# Patient Record
Sex: MALE | Race: WHITE | NOT HISPANIC OR LATINO | Employment: UNEMPLOYED | URBAN - NONMETROPOLITAN AREA
[De-identification: names, ages, dates, MRNs, and addresses within clinical notes are randomized per-mention and may not be internally consistent; named-entity substitution may affect disease eponyms.]

---
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
## 2020-01-10 ENCOUNTER — OFFICE VISIT (OUTPATIENT)
Dept: FAMILY MEDICINE | Facility: OTHER | Age: 20
End: 2020-01-10
Attending: NURSE PRACTITIONER
Payer: COMMERCIAL

## 2020-01-10 VITALS
HEIGHT: 66 IN | BODY MASS INDEX: 32.5 KG/M2 | TEMPERATURE: 97.8 F | OXYGEN SATURATION: 98 % | DIASTOLIC BLOOD PRESSURE: 80 MMHG | RESPIRATION RATE: 16 BRPM | HEART RATE: 81 BPM | SYSTOLIC BLOOD PRESSURE: 118 MMHG | WEIGHT: 202.25 LBS

## 2020-01-10 DIAGNOSIS — S46.812A TRAPEZIUS STRAIN, LEFT, INITIAL ENCOUNTER: Primary | ICD-10-CM

## 2020-01-10 PROCEDURE — 99213 OFFICE O/P EST LOW 20 MIN: CPT | Performed by: NURSE PRACTITIONER

## 2020-01-10 PROCEDURE — G0463 HOSPITAL OUTPT CLINIC VISIT: HCPCS

## 2020-01-10 ASSESSMENT — MIFFLIN-ST. JEOR: SCORE: 1875.15

## 2020-01-10 ASSESSMENT — PAIN SCALES - GENERAL: PAINLEVEL: MODERATE PAIN (4)

## 2020-01-10 NOTE — PROGRESS NOTES
"HPI:    Vinayak Leavitt is a 19 year old male who presents to clinic today for left shoulder pain.  He reports he was working out last night and lifting weights.  He felt a pulling sensation in his left shoulder.  He is able to move his arm without difficulties and just has some mild tenderness over the trapezius area.  He did take Tylenol last night which was helpful.  He has not applied any ice.  He just wanted to have this shoulder evaluated before he continue to work out.  No history of trapezius or shoulder injuries in the past    Past Medical History:   Diagnosis Date     Otitis media     6/6/09,left- treated with Zithromax     Streptococcal pharyngitis     No Comments Provided       Current Outpatient Medications   Medication Sig Dispense Refill     acetaminophen (TYLENOL) 500 MG tablet Take 1,000 mg by mouth every 6 hours as needed       ibuprofen (ADVIL/MOTRIN) 200 MG tablet Take 400 mg by mouth every 4 hours as needed for mild pain         No Known Allergies    ROS:  Pertinent positives and negatives are noted in HPI.    EXAM:  /80 (BP Location: Right arm, Patient Position: Sitting, Cuff Size: Adult Regular)   Pulse 81   Temp 97.8  F (36.6  C) (Tympanic)   Resp 16   Ht 1.676 m (5' 6\")   Wt 91.7 kg (202 lb 4 oz)   SpO2 98%   BMI 32.64 kg/m    General appearance: well appearing male, in no acute distress  Musculoskeletal: Normal range of motion of left shoulder, no shoulder tenderness with palpation.  Normal strength without reproduction of any discomfort.  He does have spasm noted over left trapezius on palpation  Dermatological: no rashes or lesions  Psychological: normal affect, alert and pleasant    ASSESSMENT AND PLAN:    1. Trapezius strain, left, initial encounter      Left trapezius strain related to recent weight lifting.  Discussed symptomatic management including ice and NSAIDs.  Recommend limiting upper body/overhead lifting over the next week.  Follow-up as needed.      Raquel GUTIERREZ" ABI Elliott CNP..................1/10/2020 10:38 AM      This document was prepared using voice generated software.  While every attempt was made for accuracy, grammatical errors may exist.

## 2020-01-10 NOTE — PATIENT INSTRUCTIONS
Left shoulder strain-ice to shoulder often, tylenol or ibuprofen as needed, limit over-head weights for the next week  Follow up as needed

## 2020-02-06 ENCOUNTER — OFFICE VISIT (OUTPATIENT)
Dept: FAMILY MEDICINE | Facility: OTHER | Age: 20
End: 2020-02-06
Attending: FAMILY MEDICINE
Payer: COMMERCIAL

## 2020-02-06 VITALS
RESPIRATION RATE: 16 BRPM | WEIGHT: 204.2 LBS | OXYGEN SATURATION: 100 % | DIASTOLIC BLOOD PRESSURE: 65 MMHG | BODY MASS INDEX: 32.05 KG/M2 | HEIGHT: 67 IN | TEMPERATURE: 97.5 F | SYSTOLIC BLOOD PRESSURE: 112 MMHG | HEART RATE: 65 BPM

## 2020-02-06 DIAGNOSIS — J02.9 SORE THROAT: Primary | ICD-10-CM

## 2020-02-06 LAB
SPECIMEN SOURCE: NORMAL
STREP GROUP A PCR: NOT DETECTED

## 2020-02-06 PROCEDURE — G0463 HOSPITAL OUTPT CLINIC VISIT: HCPCS

## 2020-02-06 PROCEDURE — 99213 OFFICE O/P EST LOW 20 MIN: CPT | Performed by: PHYSICIAN ASSISTANT

## 2020-02-06 PROCEDURE — 87651 STREP A DNA AMP PROBE: CPT | Mod: ZL | Performed by: FAMILY MEDICINE

## 2020-02-06 ASSESSMENT — PAIN SCALES - GENERAL: PAINLEVEL: MILD PAIN (2)

## 2020-02-06 ASSESSMENT — ENCOUNTER SYMPTOMS
EYES NEGATIVE: 1
COUGH: 1
SINUS PRESSURE: 0
WHEEZING: 0
SHORTNESS OF BREATH: 0
RHINORRHEA: 1
CARDIOVASCULAR NEGATIVE: 1
CONSTITUTIONAL NEGATIVE: 1
SINUS PAIN: 0
SORE THROAT: 1

## 2020-02-06 ASSESSMENT — MIFFLIN-ST. JEOR: SCORE: 1892.49

## 2020-02-06 NOTE — PROGRESS NOTES
"Nursing Notes:   Lily Hightower LPN  2/6/2020 11:45 AM  Signed  Chief Complaint   Patient presents with     Pharyngitis     x 2 days       Initial /65   Pulse 65   Temp 97.5  F (36.4  C) (Tympanic)   Resp 16   Ht 1.69 m (5' 6.54\")   Wt 92.6 kg (204 lb 3.2 oz)   SpO2 100%   BMI 32.43 kg/m    Estimated body mass index is 32.43 kg/m  as calculated from the following:    Height as of this encounter: 1.69 m (5' 6.54\").    Weight as of this encounter: 92.6 kg (204 lb 3.2 oz).  Medication Reconciliation: complete    Lily Hightower LPN    SUBJECTIVE:     HPI  Vinayak Leavitt is a 19 year old male who presents to clinic today for evaluation of sore throat and cough that started yesterday. Coughing up a yellowish mucous. Taking Tylenol at home which has helped some. No known sick contacts. Eating and drinking well. Denies fever, chills, sweats, shortness of breath, wheezing, ear pain or discharge, sinus pain or pressure, muscle or body aches.     Did receive the influenza vaccine this year.     Review of Systems   Constitutional: Negative.    HENT: Positive for rhinorrhea and sore throat. Negative for congestion, ear discharge, ear pain, sinus pressure and sinus pain.    Eyes: Negative.    Respiratory: Positive for cough. Negative for shortness of breath and wheezing.    Cardiovascular: Negative.           PAST MEDICAL HISTORY:   Past Medical History:   Diagnosis Date     Otitis media     6/6/09,left- treated with Zithromax     Streptococcal pharyngitis     No Comments Provided       PAST SURGICAL HISTORY:   Past Surgical History:   Procedure Laterality Date     OTHER SURGICAL HISTORY      [id number],OTHER,lacerated tendon     OTHER SURGICAL HISTORY      [id number],OTHER,Times two     TONSILLECTOMY      06/2017       FAMILY HISTORY:   Family History   Problem Relation Age of Onset     Heart Disease Mother         Heart Disease,cardiac coronary anomaly     Family History Negative Father         Good Health     " "Attention Deficit Disorder Maternal Half-Sister         ADD / ADHD     Family History Negative Maternal Half-Sister         Good Health     Genetic Disorder No family hx of         Genetic,No family history of asthma, allergy or atopic dermatitis.       SOCIAL HISTORY:   Social History     Tobacco Use     Smoking status: Never Smoker     Smokeless tobacco: Never Used     Tobacco comment: Quit smoking: mom smokes around him every once in awhile   Substance Use Topics     Alcohol use: No     Alcohol/week: 0.0 standard drinks      No Known Allergies    Current Outpatient Medications   Medication     acetaminophen (TYLENOL) 500 MG tablet     ibuprofen (ADVIL/MOTRIN) 200 MG tablet     No current facility-administered medications for this visit.        OBJECTIVE:     /65   Pulse 65   Temp 97.5  F (36.4  C) (Tympanic)   Resp 16   Ht 1.69 m (5' 6.54\")   Wt 92.6 kg (204 lb 3.2 oz)   SpO2 100%   BMI 32.43 kg/m    Body mass index is 32.43 kg/m .  Physical Exam  General: Pleasant, in no apparent distress.  Eyes: Sclera are white and conjunctiva are clear bilaterally. Lacrimal apparatus free of erythema, edema, and discharge bilaterally.  Ears: External ears without erythema or edema. Tympanic membranes are pearly white and without erythema, scarring or perforations bilaterally. External auditory canals are free of foreign bodies, erythema, ulcers, and masses.  Nose: External nose is symmetrical and free of lesions and deformities. Mucosa is soft pink and without erythema, edema, bleeding, or exudate. No septal perforation or deviation.  Oropharynx: Oral mucosa is pink and without ulcers, nodules, and white patches. Tongue is symmetrical, pink, and without masses or lesions. Pharynx is mildly erythematous, symmetrical, and without lesions. Uvula is midline. Tonsils are pink, symmetrical, and without edema, ulcers, or exudates, and 1+ bilaterally.  Neck: No cervical lymphadenopathy on inspection and " palpation.  Cardiovascular: Regular rate and rhythm with S1 equal to S2. No murmurs, friction rubs, or gallops.   Respiratory: Lungs are resonant and clear to auscultation bilaterally. No wheezes, crackles, or rhonchi.  Psych: Appropriate mood and affect.    Results for orders placed or performed in visit on 02/06/20   Group A Streptococcus PCR Throat Swab     Status: None   Result Value Ref Range    Specimen Description Throat     Strep Group A PCR Not Detected NDET^Not Detected         ASSESSMENT/PLAN:     1. Sore throat      Discussed with patient that given his symptoms, physical exam findings, and negative strep swab he likely has a viral URI with cough. Encouraged symptomatic management with Tylenol or ibuprofen, OTC cough or cold medications, cough drops, honey, warm teas. Call or return to the clinic if symptoms persist, worsen, or new symptoms arise.     Naz Jeter PA-C  [facility]

## 2020-02-06 NOTE — NURSING NOTE
"Chief Complaint   Patient presents with     Pharyngitis     x 2 days       Initial /65   Pulse 65   Temp 97.5  F (36.4  C) (Tympanic)   Resp 16   Ht 1.69 m (5' 6.54\")   Wt 92.6 kg (204 lb 3.2 oz)   SpO2 100%   BMI 32.43 kg/m   Estimated body mass index is 32.43 kg/m  as calculated from the following:    Height as of this encounter: 1.69 m (5' 6.54\").    Weight as of this encounter: 92.6 kg (204 lb 3.2 oz).  Medication Reconciliation: complete    Lily Hightower LPN  "

## 2020-10-28 ENCOUNTER — ALLIED HEALTH/NURSE VISIT (OUTPATIENT)
Dept: FAMILY MEDICINE | Facility: OTHER | Age: 20
End: 2020-10-28
Attending: NURSE PRACTITIONER
Payer: COMMERCIAL

## 2020-10-28 DIAGNOSIS — Z23 NEED FOR PROPHYLACTIC VACCINATION AND INOCULATION AGAINST INFLUENZA: Primary | ICD-10-CM

## 2020-10-28 PROCEDURE — G0008 ADMIN INFLUENZA VIRUS VAC: HCPCS

## 2020-10-28 NOTE — PROGRESS NOTES
Immunization Documentation    Prior to Immunization administration, verified patients identity using patient's name and date of birth. Please see IMMUNIZATIONS  and order for additional information.  Patient / Parent instructed to remain in clinic for 15 minutes and report any adverse reaction to staff immediately.    Was entire vial of medication used? Yes  Vial/Syringe: Papito Yang [facility]  10/28/2020   10:54 AM

## 2021-04-21 ENCOUNTER — IMMUNIZATION (OUTPATIENT)
Dept: FAMILY MEDICINE | Facility: OTHER | Age: 21
End: 2021-04-21
Attending: FAMILY MEDICINE
Payer: COMMERCIAL

## 2021-04-21 PROCEDURE — 91300 PR COVID VAC PFIZER DIL RECON 30 MCG/0.3 ML IM: CPT

## 2021-05-12 ENCOUNTER — IMMUNIZATION (OUTPATIENT)
Dept: FAMILY MEDICINE | Facility: OTHER | Age: 21
End: 2021-05-12
Attending: FAMILY MEDICINE
Payer: COMMERCIAL

## 2021-05-12 PROCEDURE — 0002A PR COVID VAC PFIZER DIL RECON 30 MCG/0.3 ML IM: CPT

## 2021-08-09 ENCOUNTER — ALLIED HEALTH/NURSE VISIT (OUTPATIENT)
Dept: FAMILY MEDICINE | Facility: OTHER | Age: 21
End: 2021-08-09
Attending: PHYSICIAN ASSISTANT
Payer: COMMERCIAL

## 2021-08-09 DIAGNOSIS — Z20.822 EXPOSURE TO 2019 NOVEL CORONAVIRUS: Primary | ICD-10-CM

## 2021-08-09 DIAGNOSIS — Z20.822 ENCOUNTER FOR LABORATORY TESTING FOR COVID-19 VIRUS: ICD-10-CM

## 2021-08-09 LAB — SARS-COV-2 RNA RESP QL NAA+PROBE: NEGATIVE

## 2021-08-09 PROCEDURE — U0003 INFECTIOUS AGENT DETECTION BY NUCLEIC ACID (DNA OR RNA); SEVERE ACUTE RESPIRATORY SYNDROME CORONAVIRUS 2 (SARS-COV-2) (CORONAVIRUS DISEASE [COVID-19]), AMPLIFIED PROBE TECHNIQUE, MAKING USE OF HIGH THROUGHPUT TECHNOLOGIES AS DESCRIBED BY CMS-2020-01-R: HCPCS | Mod: ZL

## 2021-08-09 PROCEDURE — C9803 HOPD COVID-19 SPEC COLLECT: HCPCS

## 2021-08-14 ENCOUNTER — HEALTH MAINTENANCE LETTER (OUTPATIENT)
Age: 21
End: 2021-08-14

## 2021-10-02 ENCOUNTER — ALLIED HEALTH/NURSE VISIT (OUTPATIENT)
Dept: FAMILY MEDICINE | Facility: OTHER | Age: 21
End: 2021-10-02
Attending: FAMILY MEDICINE
Payer: COMMERCIAL

## 2021-10-02 DIAGNOSIS — Z20.822 ENCOUNTER FOR LABORATORY TESTING FOR COVID-19 VIRUS: Primary | ICD-10-CM

## 2021-10-02 PROCEDURE — U0005 INFEC AGEN DETEC AMPLI PROBE: HCPCS | Mod: ZL

## 2021-10-02 PROCEDURE — C9803 HOPD COVID-19 SPEC COLLECT: HCPCS

## 2021-10-03 LAB — SARS-COV-2 RNA RESP QL NAA+PROBE: POSITIVE

## 2021-10-09 ENCOUNTER — HEALTH MAINTENANCE LETTER (OUTPATIENT)
Age: 21
End: 2021-10-09

## 2021-10-21 ENCOUNTER — ALLIED HEALTH/NURSE VISIT (OUTPATIENT)
Dept: FAMILY MEDICINE | Facility: OTHER | Age: 21
End: 2021-10-21
Attending: NURSE PRACTITIONER
Payer: COMMERCIAL

## 2021-10-21 DIAGNOSIS — Z23 NEED FOR PROPHYLACTIC VACCINATION AND INOCULATION AGAINST INFLUENZA: Primary | ICD-10-CM

## 2021-10-21 PROCEDURE — 90686 IIV4 VACC NO PRSV 0.5 ML IM: CPT

## 2021-10-21 PROCEDURE — G0008 ADMIN INFLUENZA VIRUS VAC: HCPCS

## 2022-09-08 ENCOUNTER — HOSPITAL ENCOUNTER (EMERGENCY)
Facility: OTHER | Age: 22
Discharge: HOME | End: 2022-09-08
Attending: FAMILY MEDICINE | Admitting: FAMILY MEDICINE
Payer: COMMERCIAL

## 2022-09-08 VITALS
HEART RATE: 80 BPM | WEIGHT: 190 LBS | OXYGEN SATURATION: 96 % | DIASTOLIC BLOOD PRESSURE: 67 MMHG | SYSTOLIC BLOOD PRESSURE: 131 MMHG | TEMPERATURE: 98.2 F | RESPIRATION RATE: 16 BRPM | BODY MASS INDEX: 27.2 KG/M2 | HEIGHT: 70 IN

## 2022-09-08 DIAGNOSIS — F43.0 ACUTE REACTION TO STRESS: ICD-10-CM

## 2022-09-08 DIAGNOSIS — K21.9 GASTROESOPHAGEAL REFLUX DISEASE, UNSPECIFIED WHETHER ESOPHAGITIS PRESENT: ICD-10-CM

## 2022-09-08 PROCEDURE — 99283 EMERGENCY DEPT VISIT LOW MDM: CPT | Performed by: FAMILY MEDICINE

## 2022-09-08 PROCEDURE — 99285 EMERGENCY DEPT VISIT HI MDM: CPT | Mod: 25 | Performed by: FAMILY MEDICINE

## 2022-09-08 PROCEDURE — 250N000013 HC RX MED GY IP 250 OP 250 PS 637: Performed by: FAMILY MEDICINE

## 2022-09-08 RX ORDER — PANTOPRAZOLE SODIUM 40 MG/1
40 TABLET, DELAYED RELEASE ORAL
Status: DISCONTINUED | OUTPATIENT
Start: 2022-09-08 | End: 2022-09-08 | Stop reason: HOSPADM

## 2022-09-08 RX ADMIN — PANTOPRAZOLE SODIUM 40 MG: 40 TABLET, DELAYED RELEASE ORAL at 20:10

## 2022-09-08 ASSESSMENT — ENCOUNTER SYMPTOMS
ROS GI COMMENTS: REFLUX
NERVOUS/ANXIOUS: 1
SLEEP DISTURBANCE: 1

## 2022-09-08 ASSESSMENT — ACTIVITIES OF DAILY LIVING (ADL): ADLS_ACUITY_SCORE: 35

## 2022-09-09 ENCOUNTER — OFFICE VISIT (OUTPATIENT)
Dept: FAMILY MEDICINE | Facility: OTHER | Age: 22
End: 2022-09-09
Attending: NURSE PRACTITIONER
Payer: COMMERCIAL

## 2022-09-09 VITALS
WEIGHT: 193 LBS | RESPIRATION RATE: 16 BRPM | SYSTOLIC BLOOD PRESSURE: 120 MMHG | TEMPERATURE: 98.2 F | HEART RATE: 72 BPM | OXYGEN SATURATION: 97 % | BODY MASS INDEX: 30.29 KG/M2 | HEIGHT: 67 IN | DIASTOLIC BLOOD PRESSURE: 70 MMHG

## 2022-09-09 DIAGNOSIS — K21.00 GASTROESOPHAGEAL REFLUX DISEASE WITH ESOPHAGITIS WITHOUT HEMORRHAGE: ICD-10-CM

## 2022-09-09 DIAGNOSIS — Z00.00 ROUTINE HISTORY AND PHYSICAL EXAMINATION OF ADULT: Primary | ICD-10-CM

## 2022-09-09 DIAGNOSIS — R73.09 ELEVATED GLUCOSE: ICD-10-CM

## 2022-09-09 DIAGNOSIS — K61.2 ABSCESS OF ANAL AND RECTAL REGIONS: ICD-10-CM

## 2022-09-09 DIAGNOSIS — F84.0 ACTIVE AUTISTIC DISORDER: ICD-10-CM

## 2022-09-09 DIAGNOSIS — F43.21 GRIEF REACTION: ICD-10-CM

## 2022-09-09 LAB
ALBUMIN SERPL-MCNC: 4.9 G/DL (ref 3.5–5.7)
ALP SERPL-CCNC: 56 U/L (ref 34–104)
ALT SERPL W P-5'-P-CCNC: 14 U/L (ref 7–52)
ANION GAP SERPL CALCULATED.3IONS-SCNC: 10 MMOL/L (ref 3–14)
AST SERPL W P-5'-P-CCNC: 16 U/L (ref 13–39)
BILIRUB SERPL-MCNC: 0.7 MG/DL (ref 0.3–1)
BUN SERPL-MCNC: 16 MG/DL (ref 7–25)
CALCIUM SERPL-MCNC: 10.4 MG/DL (ref 8.6–10.3)
CHLORIDE BLD-SCNC: 101 MMOL/L (ref 98–107)
CO2 SERPL-SCNC: 29 MMOL/L (ref 21–31)
CREAT SERPL-MCNC: 1.21 MG/DL (ref 0.7–1.3)
GFR SERPL CREATININE-BSD FRML MDRD: 87 ML/MIN/1.73M2
GLUCOSE BLD-MCNC: 92 MG/DL (ref 70–105)
POTASSIUM BLD-SCNC: 3.8 MMOL/L (ref 3.5–5.1)
PROT SERPL-MCNC: 7.6 G/DL (ref 6.4–8.9)
SODIUM SERPL-SCNC: 140 MMOL/L (ref 134–144)

## 2022-09-09 PROCEDURE — 80053 COMPREHEN METABOLIC PANEL: CPT | Mod: ZL | Performed by: NURSE PRACTITIONER

## 2022-09-09 PROCEDURE — 36415 COLL VENOUS BLD VENIPUNCTURE: CPT | Mod: ZL | Performed by: NURSE PRACTITIONER

## 2022-09-09 PROCEDURE — 82040 ASSAY OF SERUM ALBUMIN: CPT | Mod: ZL | Performed by: NURSE PRACTITIONER

## 2022-09-09 PROCEDURE — 90715 TDAP VACCINE 7 YRS/> IM: CPT

## 2022-09-09 PROCEDURE — 99395 PREV VISIT EST AGE 18-39: CPT | Performed by: NURSE PRACTITIONER

## 2022-09-09 PROCEDURE — 99212 OFFICE O/P EST SF 10 MIN: CPT | Mod: 25 | Performed by: NURSE PRACTITIONER

## 2022-09-09 PROCEDURE — G0463 HOSPITAL OUTPT CLINIC VISIT: HCPCS | Mod: 25

## 2022-09-09 ASSESSMENT — ENCOUNTER SYMPTOMS
MYALGIAS: 0
DYSURIA: 0
COUGH: 0
CONSTIPATION: 0
HEMATURIA: 0
WEAKNESS: 0
DIARRHEA: 0
CHILLS: 0
PARESTHESIAS: 0
EYE PAIN: 0
NAUSEA: 0
PALPITATIONS: 0
HEADACHES: 0
SHORTNESS OF BREATH: 0
HEARTBURN: 1
FREQUENCY: 0
FEVER: 0
DIZZINESS: 0
JOINT SWELLING: 0
SORE THROAT: 0
HEMATOCHEZIA: 0
ABDOMINAL PAIN: 0
NERVOUS/ANXIOUS: 1
ARTHRALGIAS: 0

## 2022-09-09 ASSESSMENT — PATIENT HEALTH QUESTIONNAIRE - PHQ9
10. IF YOU CHECKED OFF ANY PROBLEMS, HOW DIFFICULT HAVE THESE PROBLEMS MADE IT FOR YOU TO DO YOUR WORK, TAKE CARE OF THINGS AT HOME, OR GET ALONG WITH OTHER PEOPLE: NOT DIFFICULT AT ALL
SUM OF ALL RESPONSES TO PHQ QUESTIONS 1-9: 3
SUM OF ALL RESPONSES TO PHQ QUESTIONS 1-9: 3

## 2022-09-09 ASSESSMENT — ANXIETY QUESTIONNAIRES
4. TROUBLE RELAXING: NOT AT ALL
6. BECOMING EASILY ANNOYED OR IRRITABLE: NOT AT ALL
7. FEELING AFRAID AS IF SOMETHING AWFUL MIGHT HAPPEN: NOT AT ALL
IF YOU CHECKED OFF ANY PROBLEMS ON THIS QUESTIONNAIRE, HOW DIFFICULT HAVE THESE PROBLEMS MADE IT FOR YOU TO DO YOUR WORK, TAKE CARE OF THINGS AT HOME, OR GET ALONG WITH OTHER PEOPLE: NOT DIFFICULT AT ALL
8. IF YOU CHECKED OFF ANY PROBLEMS, HOW DIFFICULT HAVE THESE MADE IT FOR YOU TO DO YOUR WORK, TAKE CARE OF THINGS AT HOME, OR GET ALONG WITH OTHER PEOPLE?: NOT DIFFICULT AT ALL
GAD7 TOTAL SCORE: 0
5. BEING SO RESTLESS THAT IT IS HARD TO SIT STILL: NOT AT ALL
GAD7 TOTAL SCORE: 0
1. FEELING NERVOUS, ANXIOUS, OR ON EDGE: NOT AT ALL
3. WORRYING TOO MUCH ABOUT DIFFERENT THINGS: NOT AT ALL
2. NOT BEING ABLE TO STOP OR CONTROL WORRYING: NOT AT ALL
7. FEELING AFRAID AS IF SOMETHING AWFUL MIGHT HAPPEN: NOT AT ALL
GAD7 TOTAL SCORE: 0

## 2022-09-09 NOTE — NURSING NOTE
Patient presents today for annual physical. Patient was just in the ED and has been dealing with a lot of stress.    Medication Reconciliation Complete    Rosamaria Vidal LPN  9/9/2022 7:46 AM

## 2022-09-09 NOTE — CONSULTS
..Diagnostic Evaluation Consultation  Crisis Assessment    Patient was assessed: Rylan  Patient location: [place]   Was a release of information signed: Yes. Providers included on the release: did not witness      Referral Data and Chief Complaint  Vinayak is a 21 year old, who uses he/him pronouns, and presents to the ED with family/friends. Patient is referred to the ED by family/friends. Patient is presenting to the ED for the following concerns: Vinayak reports increased anxiety as the 1 year anniversary(9/11) of his father's death approaches.  He reports he hasn't been able to eat, sleeps to much and his heart races.     Informed Consent and Assessment Methods     Patient is his own guardian. Writer met with patient and explained the crisis assessment process, including applicable information disclosures and limits to confidentiality, assessed understanding of the process, and obtained consent to proceed with the assessment. Patient was observed to be able to participate in the assessment as evidenced by particpating in assessment. Assessment methods included conducting a formal interview with patient, review of medical records, collaboration with medical staff, and obtaining relevant collateral information from family and community providers when available..     Over the course of this crisis assessment provided reassurance, offered validation, engaged patient in problem solving and disposition planning and worked with patient on brief, therapeutic activity: supportive listening, psychoeducation. Patient's response to interventions was positive     Summary of Patient Situation    Vinayak was brought to GI ED by his mother for increased anxiety that includes lose of appetite, fatigue, increased sleep, racing heart and feeling overwhelmed.   The patient reports his symptoms have increased in the last week as the 1 year anniversary of his father's death approaches(9/11/21).   The patient's father was killing  "in a head on collision and his good friend was the person driving the other car.   He reports additional family stress that includes his mother being ill and his sister getting an  after his dad's death.  The patient has not seen a therapist but does have a great deal of support from his mother and grandmother.      Brief Psychosocial History       Vinayak lives at home with his mother and grandmother.  He graduated from high school but is currently unemployed and does not attend school.  He reports he has a extended support system that includes his mother and grandmother and \"lots of friends\".    The patient reports he \"loves sports\" and dreams of being an .      Significant Clinical History       Vinayak was diagnosed with Autism in early childhood.  He denies any other diagnosis.  He received children's mental health CM and services through [facility].  He has recently reached out to them to see what type of support he can get.  He has never been hospitalized for mental health, denies taking medication for mental health.  He has never attempted suicide or SIB.   He denies substance abuse or trauma      Collateral Information    ..The following information was received from Petty whose relationship to the patient is mother. Information was obtained in person.   What happened today: Mom was present and reports patient has been under a great deal of stress due to his father's death, mom's health and family issues.      What is different about patient's functioning: She reports he has had trouble eating and sleeps more    Concern about alcohol/drug use: No    What do you think the patient needs: to see a therapist    Has patient made comments about wanting to kill themselves/others:  No    If d/c is recommended, can they take part in safety/aftercare planning: Yes they live together             Risk Assessment  ESS-6  1.a. Over the past 2 weeks, have you had thoughts of killing yourself? No  1.b. " Have you ever attempted to kill yourself and, if yes, when did this last happen? No   2. Recent or current suicide plan? No   3. Recent or current intent to act on ideation? No  4. Lifetime psychiatric hospitalization? No  5. Pattern of excessive substance use? No  6. Current irritability, agitation, or aggression? No  Scoring note: BOTH 1a and 1b must be yes for it to score 1 point, if both are not yes it is zero. All others are 1 point per number. If all questions 1a/1b - 6 are no, risk is negligible. If one of 1a/1b is yes, then risk is mild. If either question 2 or 3, but not both, is yes, then risk is automatically moderate regardless of total score. If both 2 and 3 are yes, risk is automatically high regardless of total score.      Score: 0, negligible risk      Does the patient have access to lethal means? No     Does the patient engage in non-suicidal self-injurious behavior (NSSI/SIB)? no     Does the patient have thoughts of harming others? No     Is the patient engaging in sexually inappropriate behavior?  no        Current Substance Abuse     Is there recent substance abuse? no     Was a urine drug screen or blood alcohol level obtained: No       Mental Status Exam     Affect: Appropriate   Appearance: Appropriate    Attention Span/Concentration: Attentive  Eye Contact: Engaged   Fund of Knowledge: Appropriate    Language /Speech Content: Fluent   Language /Speech Volume: Normal    Language /Speech Rate/Productions: Normal    Recent Memory: Intact   Remote Memory: Intact   Mood: Sad    Orientation to Person: Yes    Orientation to Place: Yes   Orientation to Time of Day: Yes    Orientation to Date: Yes    Situation (Do they understand why they are here?): Yes    Psychomotor Behavior: Normal    Thought Content: Clear   Thought Form: Intact      History of commitment: No           Medication    Psychotropic medications: No  Medication changes made in the last two weeks: No       Current Care Team    Primary  Care Provider: No  Psychiatrist: No  Therapist: No  : No     CTSS or ARMHS: No  ACT Team: No  Other: No      Diagnosis    Autism Spectrum Disorder 299.00(F84.0)  Associated with another neurodevelopmental, mental or behavioral disorder         Clinical Summary and Substantiation of Recommendations    Vinayak is a 21 year old male with a history of ASD who presents at ER with increase anxiety as the 1 year anniversary of his father's death approaches.   He denies suicidal or homicidal thoughts, plan or intent.  Patient is insightful and would like to receive outpatient therapy.   It is recommended he be discharged and attend therapy which was discussed with Dr. Alcantara who agrees with this plan.       Disposition    Recommended disposition: Individual Therapy       Reviewed case and recommendations with attending provider. Attending Name: Quinton       Attending concurs with disposition: Yes       Patient concurs with disposition: Yes     Guardian concurs with disposition: NA      Final disposition: Individual therapy .     Inpatient Details (if applicable):   Is patient admitted voluntarily:NA      Patient aware of potential for transfer if there is not appropriate placement? NA       Patient is willing to travel outside of the [facility] for placement? NA      Behavioral Intake Notified? NA     Outpatient Details (if applicable):   Aftercare plan and appointments placed in the AVS and provided to patient: Yes. Given to patient by ED staff    Was lethal means counseling provided as a part of aftercare planning? No;       Assessment Details    Patient interview started at: 8:37 and completed at: 8:55.     Total duration spent on the patient case in minutes: 1.25 hrs      CPT code(s) utilized: 68023 - Psychotherapy for Crisis - 60 (30-74*) min       MEGAN Roberts, JOCELYN, MEGAN, LM  [facility] - Triage & Transition Services  Callback: [phone]        ..Aftercare Plan  If I am feeling unsafe or I am in a  crisis, I will:   Contact my established care providers   Call the National Suicide Prevention Lifeline: 988  Go to the nearest emergency room   Call 911     Warning signs that I or other people might notice when a crisis is developing for me: isolating, feeling anxious    Things I am able to do on my own to cope or help me feel better: go golfing, get outside, watch sports     Things that I am able to do with others to cope or help me better: talk to family, watch sports with others, talk to friends     Things I can use or do for distraction: anything related to sports, spend time with family     Changes I can make to support my mental health and wellness: Start seeing a therapist     People in my life that I can ask for help: my mom, my grandma     Your [facility] has a mental health crisis team you can call 24/7: Ezra [phone]        Additional resources and information:Free Remote Counseling  Here s how to talk with a counselor (only during clinic hours):    COUNSELING SERVICES ARE COMPLETELY FREE AND ANONYMOUS. [facility] is a non-profit, non-Gnosticism organization supported by donations and grants. All of our professional counselors volunteer their time. Due to COVID-19, all clinics are now phone-in or log-in only. Counseling is for individuals, couples, or families. No appointment or insurance needed.    When you  join the meeting,  you are entering a waiting area. A  will greet you and place you in a private room, where a counselor will join you as soon as possible. Please be patient.    To download printable instructions, click on Download button.    Download  Clinic Hours:  Monday:  1:00p - 3:00p, 5:30p-7:30p    Tuesday: 5:30p - 7:30p     Wednesday: 1:00p - 3:00p, 5:30p - 7:30p    Thursday: 5:30p - 7:30p    Friday: 1:00p - 3:00p    *[facility] will be CLOSED Monday, September 5th for the Labor Day holiday.    Here s how to call in by smart cell phone:  PLEASE DO NOT CALL WHILE DRIVING!    To  attend a clinic using the smart cell phone  one-tap mobile  button, click on this link and press the dial button on your phone:  [phone]  When Zoom answers, it may ask for a meeting id (you won t have one), so just wait until it asks you to simply press the # (number) button.    If the phone line is busy, try the phone numbers below. Try each phone number. Or, use landline phone-in instructions below.  [phone]  [phone]  [phone]  [phone]  [phone]    Here s how to attend a clinic with video:  If you would like counseling with video, you may use a computer, tablet or smart cell phone. If your computer doesn t have a camera, you can still use it to access the clinic by audio if you have a microphone.    Login  Or paste this link into your browser: [email or web address]    Here s how to call in by landline (regular telephone):  During clinic hours, call [phone]. If busy, try the next number in the list below until you get through.    When prompted, enter meeting [phone]    [phone]   [phone]  [phone]  [phone]  [phone]    Services for Namibian and Hmong Speakers  Services for Namibian and Hmong speakers remain the same.  The client can simply call our main number ([phone]), dial extension 2 (Namibian) or 3 (Hmong), and leave a message with the call-back phone number.    Counseling by Appointment  If you are interested in ongoing counseling, the first step is to see a counselor at a clinic. Ongoing counseling is arranged by request during the clinic.                Crisis Lines  Crisis Text Line  Text 413180  You will be connected with a trained live crisis counselor to provide support.    Por espanol, texto  CAROLINA a 749025 o texto a 442-AYUDAME en WhatsApp    [facility] [employer] (LGBTQ Youth Crisis Line)  9.513.490.4410  text START to 810-998      [facility]  Fast Tracker   "Linking people to mental health and substance use disorder resources  [employer]Onaro     [place] Mental Health Warm Line  Peer to peer support  Monday thru Saturday, 12 pm to 10 pm  406.137.7507 or 1.291.746.0265  Text \"Support\" to 32696    National [place] [employer] Mental Illness (BRIELLE)  443.905.6383 or 1.888.BRIELLE.HELPS      Mental Health Apps  My3  https://Zebra Mobile.org/    VirtualHopeBox  https://Abcellute/apps/virtual-hope-box/      Additional Information  Today you were seen by a licensed mental health professional through Triage and Transition services, [facility] ([facility])  for a crisis assessment in the Emergency Department at [facility].  It is recommended that you follow up with your established providers (psychiatrist, mental health therapist, and/or primary care doctor - as relevant) as soon as possible. Coordinators from [facility] will be calling you in the next 24-48 hours to ensure that you have the resources you need.  You can also contact [facility] coordinators directly at [phone]. You may have been scheduled for or offered an appointment with a mental health provider. [facility] maintains an extensive network of licensed behavioral health providers to connect patients with the services they need.  We do not charge providers a fee to participate in our referral network.  We match patients with providers based on a patient's specific needs, insurance coverage, and location.  Our first effort will be to refer you to a provider within your care system, and will utilize providers outside your care system as needed.          "

## 2022-09-09 NOTE — LETTER
September 9, 2022      Vinayak Leavitt  [address]   [place] [address]        Dear ,    We are writing to inform you of your test results.    Your test results fall within the expected range(s) or remain unchanged from previous results.  Please continue with current treatment plan.    Resulted Orders   Comprehensive Metabolic Panel   Result Value Ref Range    Sodium 140 134 - 144 mmol/L    Potassium 3.8 3.5 - 5.1 mmol/L    Chloride 101 98 - 107 mmol/L    Carbon Dioxide (CO2) 29 21 - 31 mmol/L    Anion Gap 10 3 - 14 mmol/L    Urea Nitrogen 16 7 - 25 mg/dL    Creatinine 1.21 0.70 - 1.30 mg/dL    Calcium 10.4 (H) 8.6 - 10.3 mg/dL    Glucose 92 70 - 105 mg/dL    Alkaline Phosphatase 56 34 - 104 U/L    AST 16 13 - 39 U/L    ALT 14 7 - 52 U/L    Protein Total 7.6 6.4 - 8.9 g/dL    Albumin 4.9 3.5 - 5.7 g/dL    Bilirubin Total 0.7 0.3 - 1.0 mg/dL    GFR Estimate 87 >60 mL/min/1.73m2      Comment:      Effective December 21, 2021 eGFRcr in adults is calculated using the 2021 CKD-EPI creatinine equation which includes age and gender ( et al., NEJM, DOI: 10.1056/ADCEln7805681)       If you have any questions or concerns, please call the clinic at the number listed above.       Sincerely,      ABI Sanders CNP

## 2022-09-09 NOTE — ED PROVIDER NOTES
"  History     Chief Complaint   Patient presents with     Anxiety     Here with Mom    The history is provided by the patient and a parent.     Vinayak Leavitt is a 21 year old male here with anxiety.  was the one year anniversary of the death of his dad. His dad was killed in a head-on collision with a classmate of Kailey. Vinayak's sisters had pretty dysfunctional reactions to this- one stole the  money and Dad's truck, bought heroin and her boyfriend overdosed and , and now has three warrants out for her arrest, another \"lawyered up\" to get the inheritance, and random people seem to be stealing things from his father's estate. Vinayak has not been sleeping well, has decreased appetite and has been anxious about all of this. He does not drink alcohol and has been trying to care for himself in healthy ways but does not feel he is doing okay. No thoughts of self harm. [facility], who he used when his dad .     He has been having some reflux symptoms lately, which is new for him. His mom takes omeprazole and he wonders if he should be on something like that.     He has autism. No Rx meds.    Allergies:  No Known Allergies    Problem List:    Patient Active Problem List    Diagnosis Date Noted     Active autistic disorder 2018     Priority: Medium     Overview:   High functioning, photogenic memory.  Diagnosed in early childhood by school psychologist.  Has an IEP and a para at school.  Doing grade level schoolwork.         Acne 2017     Priority: Medium        Past Medical History:    Past Medical History:   Diagnosis Date     Otitis media      Streptococcal pharyngitis        Past Surgical History:    Past Surgical History:   Procedure Laterality Date     OTHER SURGICAL HISTORY      189963,OTHER,lacerated tendon     OTHER SURGICAL HISTORY      245632,OTHER,Times two     TONSILLECTOMY      2017       Family History:    Family History " "  Problem Relation Age of Onset     Heart Disease Mother         Heart Disease,cardiac coronary anomaly     Family History Negative Father         Good Health     Attention Deficit Disorder Maternal Half-Sister         ADD / ADHD     Family History Negative Maternal Half-Sister         Good Health     Genetic Disorder No family hx of         Genetic,No family history of asthma, allergy or atopic dermatitis.       Social History:  Marital Status:  Single [1]  Social History     Tobacco Use     Smoking status: Never Smoker     Smokeless tobacco: Never Used     Tobacco comment: Quit smoking: mom smokes around him every once in awhile   Substance Use Topics     Alcohol use: No     Alcohol/week: 0.0 standard drinks     Drug use: No        Medications:    omeprazole (PRILOSEC) 20 MG DR capsule  acetaminophen (TYLENOL) 500 MG tablet  ibuprofen (ADVIL/MOTRIN) 200 MG tablet          Review of Systems   Gastrointestinal:        Reflux   Psychiatric/Behavioral: Positive for sleep disturbance. Negative for self-injury and suicidal ideas. The patient is nervous/anxious.    All other systems reviewed and are negative.      Physical Exam   BP: 131/67  Pulse: 80  Temp: 98.2  F (36.8  C)  Resp: 16  Height: 177.8 cm (5' 10\")  Weight: 92.5 kg (204 lb)  SpO2: 96 %      Physical Exam  Vitals and nursing note reviewed.   Constitutional:       General: He is not in acute distress.     Appearance: Normal appearance. He is not ill-appearing, toxic-appearing or diaphoretic.   Cardiovascular:      Rate and Rhythm: Normal rate and regular rhythm.      Pulses: Normal pulses.      Heart sounds: Normal heart sounds. No murmur heard.  Pulmonary:      Effort: Pulmonary effort is normal. No respiratory distress.      Breath sounds: Normal breath sounds.   Abdominal:      General: Bowel sounds are normal.      Palpations: Abdomen is soft.      Tenderness: There is no abdominal tenderness. There is no guarding.   Skin:     General: Skin is warm and " "dry.   Neurological:      General: No focal deficit present.      Mental Status: He is alert and oriented to person, place, and time.   Psychiatric:         Mood and Affect: Mood normal.         Behavior: Behavior normal.         Thought Content: Thought content normal.         Judgment: Judgment normal.         No results found for this or any previous visit (from the past 24 hour(s)).    Medications   pantoprazole (PROTONIX) EC tablet 40 mg (40 mg Oral Given 22)       Assessments & Plan (with Medical Decision Making)  Vinayak Leavitt is a 21 year old male here with anxiety.  was the one year anniversary of the death of his dad. His dad was killed in a head-on collision with a classmate of Kailey. Vinayak's sisters had pretty dysfunctional reactions to this- one stole the  money and Dad's truck, bought heroin and her boyfriend overdosed and , and now has three warrants out for her arrest, another \"lawyered up\" to get the inheritance, and random people seem to be stealing things from his father's estate. Vinayak has not been sleeping well, has decreased appetite and has been anxious about all of this. He does not drink alcohol and has been trying to care for himself in healthy ways but does not feel he is doing okay. No thoughts of self harm. He has [facility] to [facility], who he used when his dad .  He has been having some reflux symptoms lately, which is new for him. His mom takes omeprazole and he wonders if he should be on something like that.  He has autism. No Rx meds.  VS in the ED /67   Pulse 80   Temp 98.2  F (36.8  C) (Tympanic)   Resp 16   Ht 1.778 m (5' 10\")   Wt 86.2 kg (190 lb)   SpO2 96%   BMI 27.26 kg/m    Exam shows normal heart and lung sounds. We gave him pantoprazole and asked [facility] to help with therapy options.  I did send a Rx for omeprazole to [facility] for him.    8:58 PM   I spoke with the [facility] staff about this visit. " He is experiencing grief and they will help with a therapy session for him.      I have reviewed the nursing notes.    I have reviewed the findings, diagnosis, plan and need for follow up with the patient.       New Prescriptions    OMEPRAZOLE (PRILOSEC) 20 MG DR CAPSULE    Take 1 capsule (20 mg) by mouth daily for 30 days       Final diagnoses:   Acute reaction to stress - anniversary of the death of his father   Gastroesophageal reflux disease, unspecified whether esophagitis present       9/8/2022   [facility] AND [facility]     Parveen Alcantara MD  09/08/22 2054       Parveen Alcantara MD  09/08/22 2059

## 2022-09-09 NOTE — ED TRIAGE NOTES
Pt presents to ED with c/o increased stress/anxiety. States he has a lot of life stressors recently, coming up on 1 year since his dad was killed in an accident. States he has felt nausea, decreased sleep from being stressed. Not on any medications.   /67   Pulse 80   Temp 98.2  F (36.8  C) (Tympanic)   Resp 16   Wt 92.5 kg (204 lb)   SpO2 96%   BMI 32.40 kg/m         Triage Assessment     Row Name 09/08/22 1904       Triage Assessment (Adult)    Airway WDL WDL       Respiratory WDL    Respiratory WDL WDL       Skin Circulation/Temperature WDL    Skin Circulation/Temperature WDL WDL       Cardiac WDL    Cardiac WDL WDL       Peripheral/Neurovascular WDL    Peripheral Neurovascular WDL WDL       Cognitive/Neuro/Behavioral WDL    Cognitive/Neuro/Behavioral WDL WDL
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
## 2022-09-09 NOTE — DISCHARGE INSTRUCTIONS
Vinayak    I hope that the therapy resources are helpful for you.    You can try the omeprazole for reflux, which is the same medicine that your mom is taking.  I sent a prescription to [facility] for you.    Thank you for choosing our Emergency Department for your care.     You may receive a phone call or letter for a survey about your care in our ED.  Please complete this as this is how we improve care for our patients.     If you have any questions after leaving the ED you can call or text me on my cell phone at [phone].    Sincerely,    Dr Thom Alcantara M.D.        ..Aftercare Plan  If I am feeling unsafe or I am in a crisis, I will:   Contact my established care providers   Call the National Suicide Prevention Lifeline: 988  Go to the nearest emergency room   Call 911     Warning signs that I or other people might notice when a crisis is developing for me: isolating, feeling anxious    Things I am able to do on my own to cope or help me feel better: go golfing, get outside, watch sports     Things that I am able to do with others to cope or help me better: talk to family, watch sports with others, talk to friends     Things I can use or do for distraction: anything related to sports, spend time with family     Changes I can make to support my mental health and wellness: Start seeing a therapist     People in my life that I can ask for help: my mom, my grandma     Your [facility] has a mental health crisis team you can call 24/7:  [place] [phone]        Additional resources and information:Free Remote Counseling  Here s how to talk with a counselor (only during clinic hours):    COUNSELING SERVICES ARE COMPLETELY FREE AND ANONYMOUS. [facility] is a non-profit, non-Taoist organization supported by donations and grants. All of our professional counselors volunteer their time. Due to COVID-19, all clinics are now phone-in or log-in only. Counseling is for individuals, couples, or families. No appointment  or insurance needed.    When you  join the meeting,  you are entering a waiting area. A  will greet you and place you in a private room, where a counselor will join you as soon as possible. Please be patient.    To download printable instructions, click on Download button.    Download  Clinic Hours:  Monday:  1:00p - 3:00p, 5:30p-7:30p    Tuesday: 5:30p - 7:30p     Wednesday: 1:00p - 3:00p, 5:30p - 7:30p    Thursday: 5:30p - 7:30p    Friday: 1:00p - 3:00p    *[facility] will be CLOSED Monday, September 5th for the Labor Day holiday.    Here s how to call in by smart cell phone:  PLEASE DO NOT CALL WHILE DRIVING!    To attend a clinic using the smart cell phone  one-tap mobile  button, click on this link and press the dial button on your phone:  [phone]  When Zoom answers, it may ask for a meeting id (you won t have one), so just wait until it asks you to simply press the # (number) button.    If the phone line is busy, try the phone numbers below. Try each phone number. Or, use landline phone-in instructions below.  [phone]  [phone]  [phone]  [phone]  [phone]    Here s how to attend a clinic with video:  If you would like counseling with video, you may use a computer, tablet or smart cell phone. If your computer doesn t have a camera, you can still use it to access the clinic by audio if you have a microphone.    Login  Or paste this link into your browser: [email or web address]    Here s how to call in by landline (regular telephone):  During clinic hours, call [phone]. If busy, try the next number in the list below until you get through.    When prompted, enter meeting [phone]    [phone]   [phone]  [phone]  [phone]  [phone]    Services for Malay and Hmong Speakers  Services for Malay and Hmong speakers remain the same.  The client can simply call our main number  [phone]), dial extension 2 (Mongolian) or 3 (Hmong), and leave a message with the call-back phone number.    Counseling by Appointment  If you are interested in ongoing counseling, the first step is to see a counselor at a clinic. Ongoing counseling is arranged by request during the clinic.                Crisis Lines  Crisis Text Line  Text 523416  You will be connected with a trained live crisis counselor to provide support.    Por espanol, texto  CAROLINA a 644253 o texto a 442-AYUDAME en WhatsApp    The [facility]ak [facility] (LGBTQ Youth Crisis Line)  9.105.211.4160  text START to 441-472      Community Resources  Fast Tracker  Linking people to mental health and substance use disorder resources  Tervela.wikifolio     [place] Mental Health Warm Line  Peer to peer support  Monday thru Saturday, 12 pm to 10 pm  060.737.8178 or 1.943.490.2056  Text \"Support\" to 13355    National [place] on Mental Illness (BRIELLE)  717.131.9641 or 1.888.BRIELLE.HELPS      Mental Health Apps  My3  https://[employer] Gopp.org/    VirtualHopeBox  https://[email or web address] Medicalorg/apps/virtual-hope-box/      Additional Information  Today you were seen by a licensed mental health professional through Triage and Transition services, [facility] ([facility])  for a crisis assessment in the Emergency Department at [facility].  It is recommended that you follow up with your established providers (psychiatrist, mental health therapist, and/or primary care doctor - as relevant) as soon as possible. Coordinators from [facility] will be calling you in the next 24-48 hours to ensure that you have the resources you need.  You can also contact [facility] coordinators directly at [phone]. You may have been scheduled for or offered an appointment with a mental health provider. [facility] maintains an extensive network of licensed behavioral health providers to connect patients with the services they need.  We do not charge providers a fee to participate in our " referral network.  We match patients with providers based on a patient's specific needs, insurance coverage, and location.  Our first effort will be to refer you to a provider within your care system, and will utilize providers outside your care system as needed.

## 2022-09-25 ENCOUNTER — NURSE TRIAGE (OUTPATIENT)
Dept: NURSING | Facility: CLINIC | Age: 22
End: 2022-09-25
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
## 2022-09-26 ENCOUNTER — HOSPITAL ENCOUNTER (EMERGENCY)
Facility: OTHER | Age: 22
Discharge: HOME | End: 2022-09-26
Attending: EMERGENCY MEDICINE | Admitting: EMERGENCY MEDICINE
Payer: COMMERCIAL

## 2022-09-26 VITALS
TEMPERATURE: 96.9 F | BODY MASS INDEX: 30.21 KG/M2 | HEART RATE: 64 BPM | DIASTOLIC BLOOD PRESSURE: 72 MMHG | SYSTOLIC BLOOD PRESSURE: 128 MMHG | WEIGHT: 190 LBS | OXYGEN SATURATION: 99 % | RESPIRATION RATE: 16 BRPM

## 2022-09-26 DIAGNOSIS — T59.811A SMOKE INHALATION: ICD-10-CM

## 2022-09-26 LAB
COHGB MFR BLD: 2.3 % (ref 0–2)
HOLD SPECIMEN: NORMAL
HOLD SPECIMEN: NORMAL

## 2022-09-26 PROCEDURE — 99283 EMERGENCY DEPT VISIT LOW MDM: CPT | Performed by: EMERGENCY MEDICINE

## 2022-09-26 PROCEDURE — 82375 ASSAY CARBOXYHB QUANT: CPT | Performed by: EMERGENCY MEDICINE

## 2022-09-26 PROCEDURE — 36415 COLL VENOUS BLD VENIPUNCTURE: CPT | Performed by: EMERGENCY MEDICINE

## 2022-09-26 PROCEDURE — 99282 EMERGENCY DEPT VISIT SF MDM: CPT | Performed by: EMERGENCY MEDICINE

## 2022-09-26 ASSESSMENT — ENCOUNTER SYMPTOMS
SHORTNESS OF BREATH: 0
AGITATION: 0
COUGH: 1
FEVER: 0
NAUSEA: 0
CHILLS: 0
ARTHRALGIAS: 0
LIGHT-HEADEDNESS: 0

## 2022-09-26 ASSESSMENT — ACTIVITIES OF DAILY LIVING (ADL): ADLS_ACUITY_SCORE: 33
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
## 2022-09-30 ENCOUNTER — TELEPHONE (OUTPATIENT)
Dept: FAMILY MEDICINE | Facility: OTHER | Age: 22
End: 2022-09-30

## 2022-10-03 ENCOUNTER — ALLIED HEALTH/NURSE VISIT (OUTPATIENT)
Dept: FAMILY MEDICINE | Facility: OTHER | Age: 22
End: 2022-10-03
Attending: INTERNAL MEDICINE
Payer: COMMERCIAL

## 2022-10-03 DIAGNOSIS — Z23 NEED FOR PROPHYLACTIC VACCINATION AND INOCULATION AGAINST INFLUENZA: Primary | ICD-10-CM

## 2022-10-03 PROCEDURE — 90686 IIV4 VACC NO PRSV 0.5 ML IM: CPT

## 2022-10-05 ENCOUNTER — HOSPITAL ENCOUNTER (EMERGENCY)
Facility: OTHER | Age: 22
Discharge: HOME | End: 2022-10-06
Attending: STUDENT IN AN ORGANIZED HEALTH CARE EDUCATION/TRAINING PROGRAM | Admitting: STUDENT IN AN ORGANIZED HEALTH CARE EDUCATION/TRAINING PROGRAM
Payer: COMMERCIAL

## 2022-10-05 VITALS
HEART RATE: 72 BPM | RESPIRATION RATE: 17 BRPM | DIASTOLIC BLOOD PRESSURE: 71 MMHG | HEIGHT: 67 IN | WEIGHT: 185 LBS | TEMPERATURE: 98 F | BODY MASS INDEX: 29.03 KG/M2 | SYSTOLIC BLOOD PRESSURE: 137 MMHG | OXYGEN SATURATION: 97 %

## 2022-10-05 DIAGNOSIS — R68.84 JAW PAIN: ICD-10-CM

## 2022-10-05 PROCEDURE — 99283 EMERGENCY DEPT VISIT LOW MDM: CPT | Performed by: EMERGENCY MEDICINE

## 2022-10-05 PROCEDURE — 250N000013 HC RX MED GY IP 250 OP 250 PS 637: Performed by: STUDENT IN AN ORGANIZED HEALTH CARE EDUCATION/TRAINING PROGRAM

## 2022-10-05 RX ADMIN — AMOXICILLIN AND CLAVULANATE POTASSIUM 1 TABLET: 875; 125 TABLET, FILM COATED ORAL at 23:55

## 2022-10-05 ASSESSMENT — ACTIVITIES OF DAILY LIVING (ADL): ADLS_ACUITY_SCORE: 33

## 2022-10-06 NOTE — ED TRIAGE NOTES
Patient arrives from home with complaints of pain on right side of jaw. Denies difficulty swallowing no swelling. States had a previous tooth pain on that side as well so concerned with infection. No redness or swelling noted    Triage Assessment     Row Name 10/05/22 2005       Triage Assessment (Adult)    Airway WDL WDL       Respiratory WDL    Respiratory WDL WDL       Skin Circulation/Temperature WDL    Skin Circulation/Temperature WDL WDL       Cardiac WDL    Cardiac WDL WDL       Peripheral/Neurovascular WDL    Peripheral Neurovascular WDL WDL       Cognitive/Neuro/Behavioral WDL    Cognitive/Neuro/Behavioral WDL WDL

## 2022-10-06 NOTE — DISCHARGE INSTRUCTIONS
Please review attached instructions for reasons to return to the emergency department.    Please get dental visit set up as soon as possible.

## 2022-10-06 NOTE — ED PROVIDER NOTES
History     Chief Complaint   Patient presents with     Jaw Pain     Dental Problem       Vinayak Leavitt is a 22 year old male presenting with dental pain. Pain is located in the right lower jaw with involvement of his approximately tooth #31. Symptoms started over the past several days.  Pain is described as an ache moderate intensity rated 4/10.  There is not any known precipitating etiology for this dental pain. The pain is aggravated by chewing or touching the affected tooth. The patient has not seen a dentist regarding this pain.  Last dental visit 2019.  Denies fever, chills, oral discharge, sore throat, difficulty swallowing, difficulty breathing, shortness of breath. Pain is not changed by cold.    No Known Allergies    Patient Active Problem List    Diagnosis Date Noted     Active autistic disorder 02/13/2018     Priority: Medium     Overview:   High functioning, photogenic memory.  Diagnosed in early childhood by school psychologist.  Has an IEP and a para at school.  Doing grade level schoolwork.         Acne 05/17/2017     Priority: Medium       Past Medical History:   Diagnosis Date     Otitis media      Streptococcal pharyngitis        Past Surgical History:   Procedure Laterality Date     OTHER SURGICAL HISTORY      [id number],OTHER,lacerated tendon     OTHER SURGICAL HISTORY      [id number],OTHER,Times two     TONSILLECTOMY      06/2017       Family History   Problem Relation Age of Onset     Heart Disease Mother         Heart Disease,cardiac coronary anomaly     Thyroid nodules Mother      Other - See Comments Father         MVA     Attention Deficit Disorder Maternal Half-Sister         ADD / ADHD     Substance Abuse Maternal Half-Sister      Genetic Disorder No family hx of         Genetic,No family history of asthma, allergy or atopic dermatitis.       Social History     Tobacco Use     Smoking status: Never Smoker     Smokeless tobacco: Never Used     Tobacco comment: Quit smoking: mom smokes  "around him every once in awhile   Vaping Use     Vaping Use: Never used   Substance Use Topics     Alcohol use: No     Alcohol/week: 0.0 standard drinks     Drug use: No       Medications:    acetaminophen (TYLENOL) 500 MG tablet  amoxicillin-clavulanate (AUGMENTIN) 875-125 MG tablet  ibuprofen (ADVIL/MOTRIN) 200 MG tablet  omeprazole (PRILOSEC) 20 MG DR capsule        Review of Systems: See HPI for pertinent negative and positive findings.    Physical Exam   /71   Pulse 72   Temp 98  F (36.7  C) (Tympanic)   Resp 17   Ht 1.689 m (5' 6.5\")   Wt 83.9 kg (185 lb)   SpO2 97%   BMI 29.41 kg/m       General: awake, comfortable  HEENT: atraumatic, neck supple, tooth #31 with significant decay and without any significant tenderness to percussion, no palpable fluctuance, fair dentition, oropharynx without edema or erythema or masses, uvula midline, no facial edema, questionable right submaxillary tenderness and adenopathy  Respiratory: normal effort  Cardiovascular: Appears well perfused  Extremities: no gross deformities  Skin: warm, dry, facial skin intact without discoloration  Neuro: alert, no focal deficits    ED Course           No results found for this or any previous visit (from the past 24 hour(s)).    Medications   amoxicillin-clavulanate (AUGMENTIN) 875-125 MG per tablet 1 tablet (has no administration in time range)       Procedures    Assessments & Plan (with Medical Decision Making)     I have reviewed the nursing notes.    Evaluated for dental pain. Differential includes dental caries, apical abscess, dental abscess, tooth fracture, peritonsillar abscess, gingivitis, pericoronitis, maxillary sinusitis. History and exam is concerning for dental caries or apical abscess with involvement of tooth #31 with an ache that extends into his jaw. Given the possibility of apical abscess, a trial of antibiotics was prescribed and the patient is stable for discharge with  close follow up with a dentist. " Discharged home with instructions on diagnosis including ED return precautions.    I have reviewed the findings, diagnosis, plan and need for follow up with the patient.    New Prescriptions    AMOXICILLIN-CLAVULANATE (AUGMENTIN) 875-125 MG TABLET    Take 1 tablet by mouth 2 times daily for 7 days       Final diagnoses:   Jaw pain       10/5/2022   [facility] AND [facility]       Sterling Ramirez MD  10/05/22 0251

## 2022-10-07 ENCOUNTER — OFFICE VISIT (OUTPATIENT)
Dept: FAMILY MEDICINE | Facility: OTHER | Age: 22
End: 2022-10-07
Attending: PHYSICIAN ASSISTANT
Payer: COMMERCIAL

## 2022-10-07 ENCOUNTER — NURSE TRIAGE (OUTPATIENT)
Dept: FAMILY MEDICINE | Facility: OTHER | Age: 22
End: 2022-10-07

## 2022-10-07 VITALS
OXYGEN SATURATION: 99 % | HEART RATE: 72 BPM | TEMPERATURE: 97.8 F | RESPIRATION RATE: 20 BRPM | BODY MASS INDEX: 29.36 KG/M2 | WEIGHT: 182.7 LBS | HEIGHT: 66 IN | DIASTOLIC BLOOD PRESSURE: 70 MMHG | SYSTOLIC BLOOD PRESSURE: 118 MMHG

## 2022-10-07 DIAGNOSIS — S91.052A CAT BITE OF LEFT ANKLE, INITIAL ENCOUNTER: Primary | ICD-10-CM
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
## 2022-10-07 PROCEDURE — 99212 OFFICE O/P EST SF 10 MIN: CPT | Performed by: NURSE PRACTITIONER

## 2022-10-07 PROCEDURE — G0463 HOSPITAL OUTPT CLINIC VISIT: HCPCS

## 2022-10-07 RX ORDER — SODIUM FLUORIDE 6 MG/ML
PASTE, DENTIFRICE DENTAL
COMMUNITY
Start: 2022-10-06

## 2022-10-07 RX ORDER — METRONIDAZOLE 500 MG/1
1 TABLET ORAL EVERY 6 HOURS
COMMUNITY
Start: 2022-10-06 | End: 2023-01-20

## 2022-10-07 ASSESSMENT — PAIN SCALES - GENERAL: PAINLEVEL: NO PAIN (0)

## 2022-10-07 NOTE — TELEPHONE ENCOUNTER
S-(situation): patient called and states that he was bit by his cat.     B-(background): patient up to date on Tdap. Was given on 09/09/22.    A-(assessment): Patient said that he was petting his cat and it bit his ankle, braking the skin. Area was cleaned with soap and water. Area on left ankle is red in color. No longer bleeding.  Bite happened 10 minutes prior to phone call.     R-(recommendations): Per protocol patient was advised to come in to [facility] to have the bite evaluated. Patient verbalized understanding. Patient had no questions.      Milena Black RN on 10/7/2022 at 4:44 PM      Reason for Disposition    Puncture wound (hole through the skin) from cat (teeth or claws)    Additional Information    Negative: Major bleeding (actively dripping or spurting) that can't be stopped    Negative: Sounds like a life-threatening emergency to the triager    Negative: Any break in skin from BITE (e.g., cut, puncture, or scratch) and WILD animal at risk for RABIES (e.g., bat, raccoon, rodriguez, skunk, coyote, other carnivores)    Negative: Any break in skin from BITE (e.g., cut, puncture, or scratch) and PET animal (e.g., dog, cat, or ferret) at risk for RABIES (e.g., sick, stray, unprovoked bite, developing country)    Negative: Any break in skin from BITE (e.g., cut, puncture, or scratch) and monkey    Negative: Bleeding won't stop after 10 minutes of direct pressure (using correct technique)    Negative: EXPOSURE of non-intact skin (e.g., exposed person has dermatitis, abrasion, wound)  with animal BODY FLUID (e.g., saliva such as licking, blood, brain) and animal at high-risk for RABIES (e.g., bat, raccoon, rodriguez, skunk, coyote, other carnivores)    Negative: Sounds like a serious bite injury to the triager    Negative: SEVERE pain (e.g., excruciating)    Negative: Cut (length > 1/8 inch or 3 mm) or skin tear and any animal  (Exception: Superficial scratch that doesn't go through dermis.)    Protocols used:  ANIMAL BITE-A-OH

## 2022-10-07 NOTE — PROGRESS NOTES
ASSESSMENT/PLAN:    I have reviewed the nursing notes.  I have reviewed the findings, diagnosis, plan and need for follow up with the patient.    1. Cat bite of left ankle, initial encounter  Patient is already currently taking Augmentin with 5 additional days left for dental infection.  I explained to him that Augmentin is adequate coverage for bacteria/animal harshad that cat most commonly carry in their mouths.  This would be the treatment of choice for prophylaxis or to treat active infection orally.  At this time there is no evidence of infection and the injury/bite raul is very superficial.  I discussed with him he does not need any additional antibiotic coverage and to monitor the site.  He is up-to-date on Tdap and his cat is vaccinated for rabies with no concerns.  Patient verbalized understanding.    Discussed warning signs/symptoms indicative of need to f/u    Follow up if symptoms persist or worsen or concerns    I explained my diagnostic considerations and recommendations to the patient, who voiced understanding and agreement with the treatment plan. All questions were answered. We discussed potential side effects of any prescribed or recommended therapies, as well as expectations for response to treatments.    Mariama Spears NP  10/7/2022  6:48 PM    HPI:  Vinayak Leavitt is a 22 year old male  who [facility] for concerns of cat bite today to his left foot that occurred around 4:30 PM. The foot is a bit sore. The bite is present to the inside of left ankle. Did break the skin and bled a little. He washed it up thoroughly. No redness, swelling, warmth, or drainage is present. The cat is vaccinated for rabies. Concerned about risk of infection.     ROS unremarkable otherwise.       Past Medical History:   Diagnosis Date     Otitis media     6/6/09,left- treated with Zithromax     Streptococcal pharyngitis     No Comments Provided     Past Surgical History:   Procedure Laterality  "Date     OTHER SURGICAL HISTORY      [id number],OTHER,lacerated tendon     OTHER SURGICAL HISTORY      [id number],OTHER,Times two     TONSILLECTOMY      06/2017     Social History     Tobacco Use     Smoking status: Never Smoker     Smokeless tobacco: Never Used     Tobacco comment: Quit smoking: mom smokes around him every once in awhile   Substance Use Topics     Alcohol use: No     Alcohol/week: 0.0 standard drinks     Current Outpatient Medications   Medication Sig Dispense Refill     acetaminophen (TYLENOL) 500 MG tablet Take 1,000 mg by mouth every 6 hours as needed       amoxicillin-clavulanate (AUGMENTIN) 875-125 MG tablet Take 1 tablet by mouth 2 times daily for 7 days 14 tablet 0     ibuprofen (ADVIL/MOTRIN) 200 MG tablet Take 400 mg by mouth every 4 hours as needed for mild pain       metroNIDAZOLE (FLAGYL) 500 MG tablet Take 1 tablet by mouth every 6 hours Until gone       omeprazole (PRILOSEC) 20 MG DR capsule Take 1 capsule (20 mg) by mouth daily for 30 days 30 capsule 0     SODIUM FLUORIDE 5000 PPM 1.1 % PSTE dental paste BRUSH WITH PEA SIZED AMOUNT IN THE MORNING AND AT NIGHT RIGHT BEFORE BED. EXPECTORATE EXCESS, DO NOT RINSE WITH WATER AFTER BRUSHING.       No Known Allergies  Past medical history, past surgical history, current medications and allergies reviewed and accurate to the best of my knowledge.      ROS:  Refer to HPI    /70   Pulse 72   Temp 97.8  F (36.6  C) (Temporal)   Resp 20   Ht 1.664 m (5' 5.5\")   Wt 82.9 kg (182 lb 11.2 oz)   SpO2 99%   BMI 29.94 kg/m      EXAM:  General Appearance: Well appearing 22 year old male, appropriate appearance for age. No acute distress   Respiratory: No increased work of breathing.  No cough appreciated.  Dermatological: medial aspect of left ankle: there is a superficial cat bite raul with characteristics more consistent with scratch raul to this area. No active bleeding or drainage. No surrounding erythema, warmth, or edema is visualized. " Approximately 2.5 cm in length.   Psychological: normal affect, alert, oriented, and pleasant.

## 2022-10-07 NOTE — NURSING NOTE
"Chief Complaint   Patient presents with     Cat Bite     Bit by his cat Left foot today around 4:30 PM           Initial /70   Pulse 72   Temp 97.8  F (36.6  C) (Temporal)   Resp 20   Ht 1.664 m (5' 5.5\")   Wt 82.9 kg (182 lb 11.2 oz)   SpO2 99%   BMI 29.94 kg/m   Estimated body mass index is 29.94 kg/m  as calculated from the following:    Height as of this encounter: 1.664 m (5' 5.5\").    Weight as of this encounter: 82.9 kg (182 lb 11.2 oz).         Norma J. Gosselin, LPN   "

## 2022-10-27 ENCOUNTER — TELEPHONE (OUTPATIENT)
Dept: FAMILY MEDICINE | Facility: OTHER | Age: 22
End: 2022-10-27

## 2022-10-27 ENCOUNTER — HOSPITAL ENCOUNTER (EMERGENCY)
Facility: OTHER | Age: 22
Discharge: HOME | End: 2022-10-27
Attending: PHYSICIAN ASSISTANT | Admitting: PHYSICIAN ASSISTANT
Payer: COMMERCIAL

## 2022-10-27 ENCOUNTER — OFFICE VISIT (OUTPATIENT)
Dept: FAMILY MEDICINE | Facility: OTHER | Age: 22
End: 2022-10-27
Attending: NURSE PRACTITIONER
Payer: COMMERCIAL

## 2022-10-27 VITALS
SYSTOLIC BLOOD PRESSURE: 120 MMHG | WEIGHT: 180.8 LBS | HEART RATE: 112 BPM | HEIGHT: 66 IN | BODY MASS INDEX: 29.06 KG/M2 | OXYGEN SATURATION: 96 % | TEMPERATURE: 99.2 F | RESPIRATION RATE: 16 BRPM | DIASTOLIC BLOOD PRESSURE: 74 MMHG

## 2022-10-27 VITALS
HEIGHT: 65 IN | RESPIRATION RATE: 16 BRPM | HEART RATE: 98 BPM | DIASTOLIC BLOOD PRESSURE: 82 MMHG | TEMPERATURE: 98 F | SYSTOLIC BLOOD PRESSURE: 134 MMHG | WEIGHT: 180 LBS | BODY MASS INDEX: 29.99 KG/M2 | OXYGEN SATURATION: 99 %

## 2022-10-27 DIAGNOSIS — F84.0 ACTIVE AUTISTIC DISORDER: ICD-10-CM

## 2022-10-27 DIAGNOSIS — E55.9 VITAMIN D INSUFFICIENCY: Primary | ICD-10-CM

## 2022-10-27 DIAGNOSIS — R44.0 AUDITORY HALLUCINATION: ICD-10-CM

## 2022-10-27 DIAGNOSIS — F41.9 ANXIETY: Primary | ICD-10-CM

## 2022-10-27 DIAGNOSIS — Z65.9 OTHER SOCIAL STRESSOR: ICD-10-CM

## 2022-10-27 DIAGNOSIS — R44.0 HEARING VOICES: ICD-10-CM

## 2022-10-27 LAB
ALBUMIN SERPL-MCNC: 4.7 G/DL (ref 3.5–5.7)
ALP SERPL-CCNC: 58 U/L (ref 34–104)
ALT SERPL W P-5'-P-CCNC: 30 U/L (ref 7–52)
ANION GAP SERPL CALCULATED.3IONS-SCNC: 6 MMOL/L (ref 3–14)
AST SERPL W P-5'-P-CCNC: 19 U/L (ref 13–39)
BASOPHILS # BLD AUTO: 0.1 10E3/UL (ref 0–0.2)
BASOPHILS NFR BLD AUTO: 1 %
BILIRUB SERPL-MCNC: 0.7 MG/DL (ref 0.3–1)
BUN SERPL-MCNC: 11 MG/DL (ref 7–25)
CALCIUM SERPL-MCNC: 10.2 MG/DL (ref 8.6–10.3)
CHLORIDE BLD-SCNC: 100 MMOL/L (ref 98–107)
CO2 SERPL-SCNC: 29 MMOL/L (ref 21–31)
CREAT SERPL-MCNC: 1.02 MG/DL (ref 0.7–1.3)
DEPRECATED CALCIDIOL+CALCIFEROL SERPL-MC: 27 UG/L (ref 30–100)
EOSINOPHIL # BLD AUTO: 0.1 10E3/UL (ref 0–0.7)
EOSINOPHIL NFR BLD AUTO: 1 %
ERYTHROCYTE [DISTWIDTH] IN BLOOD BY AUTOMATED COUNT: 12.5 % (ref 10–15)
FLUAV RNA SPEC QL NAA+PROBE: NEGATIVE
FLUBV RNA RESP QL NAA+PROBE: NEGATIVE
GFR SERPL CREATININE-BSD FRML MDRD: >90 ML/MIN/1.73M2
GLUCOSE BLD-MCNC: 91 MG/DL (ref 70–105)
HCT VFR BLD AUTO: 44.6 % (ref 40–53)
HGB BLD-MCNC: 14.8 G/DL (ref 13.3–17.7)
IMM GRANULOCYTES # BLD: 0 10E3/UL
IMM GRANULOCYTES NFR BLD: 0 %
IRON SATN MFR SERPL: 25 % (ref 20–55)
IRON SERPL-MCNC: 68 UG/DL (ref 50–212)
LYMPHOCYTES # BLD AUTO: 1.4 10E3/UL (ref 0.8–5.3)
LYMPHOCYTES NFR BLD AUTO: 17 %
MCH RBC QN AUTO: 27.1 PG (ref 26.5–33)
MCHC RBC AUTO-ENTMCNC: 33.2 G/DL (ref 31.5–36.5)
MCV RBC AUTO: 82 FL (ref 78–100)
MONOCYTES # BLD AUTO: 0.5 10E3/UL (ref 0–1.3)
MONOCYTES NFR BLD AUTO: 7 %
NEUTROPHILS # BLD AUTO: 6.2 10E3/UL (ref 1.6–8.3)
NEUTROPHILS NFR BLD AUTO: 74 %
NRBC # BLD AUTO: 0 10E3/UL
NRBC BLD AUTO-RTO: 0 /100
PLATELET # BLD AUTO: 287 10E3/UL (ref 150–450)
POTASSIUM BLD-SCNC: 4.1 MMOL/L (ref 3.5–5.1)
PROT SERPL-MCNC: 7.9 G/DL (ref 6.4–8.9)
RBC # BLD AUTO: 5.46 10E6/UL (ref 4.4–5.9)
RSV RNA SPEC NAA+PROBE: NEGATIVE
SARS-COV-2 RNA RESP QL NAA+PROBE: NEGATIVE
SODIUM SERPL-SCNC: 135 MMOL/L (ref 134–144)
TIBC SERPL-MCNC: 275.8 UG/DL (ref 245–400)
TRANSFERRIN SERPL-MCNC: 197 MG/DL (ref 203–362)
TSH SERPL DL<=0.005 MIU/L-ACNC: 1.55 MU/L (ref 0.4–4)
UIBC (UNSATURATED): 207.8 MG/DL
WBC # BLD AUTO: 8.3 10E3/UL (ref 4–11)

## 2022-10-27 PROCEDURE — 87637 SARSCOV2&INF A&B&RSV AMP PRB: CPT | Performed by: PHYSICIAN ASSISTANT

## 2022-10-27 PROCEDURE — G0463 HOSPITAL OUTPT CLINIC VISIT: HCPCS | Mod: 27

## 2022-10-27 PROCEDURE — 83550 IRON BINDING TEST: CPT | Mod: ZL | Performed by: NURSE PRACTITIONER

## 2022-10-27 PROCEDURE — 85025 COMPLETE CBC W/AUTO DIFF WBC: CPT | Mod: ZL | Performed by: NURSE PRACTITIONER

## 2022-10-27 PROCEDURE — C9803 HOPD COVID-19 SPEC COLLECT: HCPCS | Performed by: PHYSICIAN ASSISTANT

## 2022-10-27 PROCEDURE — 99285 EMERGENCY DEPT VISIT HI MDM: CPT | Performed by: PHYSICIAN ASSISTANT

## 2022-10-27 PROCEDURE — 80053 COMPREHEN METABOLIC PANEL: CPT | Mod: ZL | Performed by: NURSE PRACTITIONER

## 2022-10-27 PROCEDURE — 99214 OFFICE O/P EST MOD 30 MIN: CPT | Performed by: NURSE PRACTITIONER

## 2022-10-27 PROCEDURE — 82306 VITAMIN D 25 HYDROXY: CPT | Mod: ZL | Performed by: NURSE PRACTITIONER

## 2022-10-27 PROCEDURE — 99285 EMERGENCY DEPT VISIT HI MDM: CPT | Mod: 25 | Performed by: PHYSICIAN ASSISTANT

## 2022-10-27 PROCEDURE — 36415 COLL VENOUS BLD VENIPUNCTURE: CPT | Mod: ZL | Performed by: NURSE PRACTITIONER

## 2022-10-27 PROCEDURE — 99283 EMERGENCY DEPT VISIT LOW MDM: CPT | Performed by: PHYSICIAN ASSISTANT

## 2022-10-27 PROCEDURE — 84443 ASSAY THYROID STIM HORMONE: CPT | Mod: ZL | Performed by: NURSE PRACTITIONER

## 2022-10-27 RX ORDER — PENICILLIN V POTASSIUM 500 MG/1
TABLET, FILM COATED ORAL
COMMUNITY
Start: 2022-10-26 | End: 2023-01-20

## 2022-10-27 ASSESSMENT — ANXIETY QUESTIONNAIRES
4. TROUBLE RELAXING: NEARLY EVERY DAY
3. WORRYING TOO MUCH ABOUT DIFFERENT THINGS: NEARLY EVERY DAY
7. FEELING AFRAID AS IF SOMETHING AWFUL MIGHT HAPPEN: NEARLY EVERY DAY
GAD7 TOTAL SCORE: 19
8. IF YOU CHECKED OFF ANY PROBLEMS, HOW DIFFICULT HAVE THESE MADE IT FOR YOU TO DO YOUR WORK, TAKE CARE OF THINGS AT HOME, OR GET ALONG WITH OTHER PEOPLE?: EXTREMELY DIFFICULT
IF YOU CHECKED OFF ANY PROBLEMS ON THIS QUESTIONNAIRE, HOW DIFFICULT HAVE THESE PROBLEMS MADE IT FOR YOU TO DO YOUR WORK, TAKE CARE OF THINGS AT HOME, OR GET ALONG WITH OTHER PEOPLE: EXTREMELY DIFFICULT
5. BEING SO RESTLESS THAT IT IS HARD TO SIT STILL: NEARLY EVERY DAY
GAD7 TOTAL SCORE: 19
6. BECOMING EASILY ANNOYED OR IRRITABLE: SEVERAL DAYS
2. NOT BEING ABLE TO STOP OR CONTROL WORRYING: NEARLY EVERY DAY
7. FEELING AFRAID AS IF SOMETHING AWFUL MIGHT HAPPEN: NEARLY EVERY DAY
1. FEELING NERVOUS, ANXIOUS, OR ON EDGE: NEARLY EVERY DAY

## 2022-10-27 ASSESSMENT — PAIN SCALES - GENERAL: PAINLEVEL: NO PAIN (0)

## 2022-10-27 ASSESSMENT — ACTIVITIES OF DAILY LIVING (ADL): ADLS_ACUITY_SCORE: 35

## 2022-10-27 NOTE — NURSING NOTE
Patient presents today for anxiety concerns.      Medication Reconciliation Complete    Rosamaria Vidal LPN  10/27/2022 11:07 AM
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
## 2022-10-27 NOTE — DISCHARGE INSTRUCTIONS
Aftercare Plan      Recommendations:   [facility] ([facility]) recommends patient follow up with services for individual therapy and medication management. Coordinators from [facility] will be calling you in the next 24-48 hours to ensure that you have the resources you need.  You can also contact [facility] coordinators directly at [phone]. Patient is scheduled today by [facility] for a virtual therapy appointment tomorrow at 9am . Please call to confirm this appointment!      Scheduled Appointments:    1) Placed call to the Crisis Response Team (CRT) at [phone]. CRT will follow up upon pt discharging to the community by phone or in person, as appropriate. Faxed assessment to CRT at [phone].     2)   Date: Friday, 10/28/2022  Time: 9:00 am - 10:00 am  Provider: Ronald Yepez  Location: [facility], [address] [place] [address] [address], [place] [address]  Phone: [phone]  Type: Therapy - Initial (In-Person)    I wasn't sure about the insurance it appears to be a medical assistance PMAP. IMCARE PMAP with ID of [id number] [phone] [email or web address]  Scheduled By: Brijesh Keenan  Scheduled On: 10/27/2022 1:21 pm          If I am feeling unsafe or I am in a crisis, I will:  Reach out to mother, grandmother, or call 911    Contact my established care providers   Call the National Suicide Prevention Lifeline: 202.124.6361   Go to the nearest emergency room   Call 911       Warning signs that I or other people might notice when a crisis is developing for me:   Intrusive suicidal thoughts, My emotions are of hopelessness; feeling like there's no way out    Things I am able to do on my own to cope or help me feel better:  Spending quality time with loved ones, Staying hydrated, Exercise, Eating balanced meals, Going for a walk every day, Take care of daily responsibilities/needs, Focus on positive self-talk vs negative self-talk     Things that I am able to do with others to cope or help me better:   "Spending quality time with friends or family. Going for dinner, playing cards or board games, coffee shops, biking.  Exercise, Music, Deep breathing, Meditations,      Things I can use or do for distraction: Reach out to/spend time with family, friends, Shower, Exercise, Chores or do a project, Listen to music, Watch movie or TV, Call a friend, nature hiking     Changes I can make to support my mental health and wellness:  engage in counseling or therapy, medication evaluation and management, consider going back to a Confucianist    -I will commit to 30 minutes of self care daily - this can be as simple as taking a shower, going for a   walk, cooking a meal, read, writing, etc      People in my life that I can ask for help:  Family, Friends, Providers, Crisis Response Team    Your [facility] has a mental health crisis team you can call 24/7:    [facility] Crisis Response Team (CRT)   [address], [phone]         Crisis Lines  Crisis Text Line  Text 649596  You will be connected with a trained live crisis counselor to provide support.    Por espanol, texto  CAROLINA a 288388 o texto a 442-AYUDAME en WhatsApp    National Hope Line  1.800.SUICIDE [6171140]      Community Resources  Fast Tracker  Linking people to mental health and substance use disorder resources  fasttrackermn.org     [place] Mental [facility] Warm Line  Peer to peer support  Monday thru Saturday, 12 pm to 10 pm  141.355.4932 or 3.645.997.0806  Text \"Support\" to 72010    National [employer] on Mental Illness (BRIELLE)  569.009.9970 or 1.888.BRIELLE.HELPS        Additional Information  Today you were seen by a licensed mental health professional through Triage and Transition services, [facility] ([facility])  for a crisis assessment in the Emergency Department at [facility].  It is recommended that you follow up with your established providers (psychiatrist, mental health therapist, and/or primary " care doctor - as relevant) as soon as possible.     Coordinators from [facility] will be calling you in the next 24-48 hours to ensure that you have the resources you need.  You can also contact [facility] coordinators directly at [phone]. You may have been scheduled for or offered an appointment with a mental health provider. [facility] maintains an extensive network of licensed behavioral health providers to connect patients with the services they need.  We do not charge providers a fee to participate in our referral network.  We match patients with providers based on a patient's specific needs, insurance coverage, and location.  Our first effort will be to refer you to a provider within your care system, and will utilize providers outside your care system as needed.

## 2022-10-27 NOTE — ED TRIAGE NOTES
Clinic provider reports that pt will be coming to ER from evaluation of hallucinations that are telling him to hurt self and others.

## 2022-10-27 NOTE — ED PROVIDER NOTES
History     Chief Complaint   Patient presents with     Hallucinations     HPI  Vinayak Leavitt is a 22 year old male who presents to the ER for evaluation of some hallucinosis.  He is not having any command hallucinations patient's not suicidal hepatic was no way what I wanted to up to the renal for he has not homicidal he just anxious because his grandmother is going to be having some cardiac surgery in the near future and he does not want to travel to [place] because it is too dangerous.  He is having hard time coping with that given his previous history is otherwise doing well and is happy he is alert he is oriented answers questions appropriately he is nontoxic does not appear to be in acute distress    Allergies:  No Known Allergies    Problem List:    Patient Active Problem List    Diagnosis Date Noted     Active autistic disorder 02/13/2018     Priority: Medium     Overview:   High functioning, photogenic memory.  Diagnosed in early childhood by school psychologist.  Has an IEP and a para at school.  Doing grade level schoolwork.         Acne 05/17/2017     Priority: Medium        Past Medical History:    Past Medical History:   Diagnosis Date     Otitis media      Streptococcal pharyngitis        Past Surgical History:    Past Surgical History:   Procedure Laterality Date     OTHER SURGICAL HISTORY      [id number],OTHER,lacerated tendon     OTHER SURGICAL HISTORY      [id number],OTHER,Times two     TONSILLECTOMY      06/2017       Family History:    Family History   Problem Relation Age of Onset     Heart Disease Mother         Heart Disease,cardiac coronary anomaly     Thyroid nodules Mother      Other - See Comments Father         MVA     Attention Deficit Disorder Maternal Half-Sister         ADD / ADHD     Substance Abuse Maternal Half-Sister      Genetic Disorder No family hx of         Genetic,No family history of asthma, allergy or atopic dermatitis.       Social History:  Marital Status:  Single  "[1]  Social History     Tobacco Use     Smoking status: Never     Smokeless tobacco: Never     Tobacco comments:     Quit smoking: mom smokes around him every once in awhile   Vaping Use     Vaping Use: Never used   Substance Use Topics     Alcohol use: No     Alcohol/week: 0.0 standard drinks     Drug use: No        Medications:    acetaminophen (TYLENOL) 500 MG tablet  ibuprofen (ADVIL/MOTRIN) 200 MG tablet  metroNIDAZOLE (FLAGYL) 500 MG tablet  penicillin V (VEETID) 500 MG tablet  SODIUM FLUORIDE 5000 PPM 1.1 % PSTE dental paste          Review of Systems   Please see HPI for pertinent positives and negatives.  All other systems reviewed and found to be negative.      Physical Exam   BP: 134/82  Pulse: 98  Temp: 98  F (36.7  C)  Resp: 16  Height: 165.1 cm (5' 5\")  Weight: 81.6 kg (180 lb)  SpO2: 99 %  Vitals:    10/27/22 1204 10/27/22 1236   BP:  134/82   Pulse:  98   Resp:  16   Temp:  98  F (36.7  C)   TempSrc:  Tympanic   SpO2:  99%   Weight: 81.6 kg (180 lb)    Height: 1.651 m (5' 5\")          Physical Exam  Exam:  Constitutional: healthy, alert and no distress  Head: Normocephalic. No masses, lesions, tenderness or abnormalities  Neck: Neck supple. No adenopathy. Thyroid symmetric, normal size,, Carotids without bruits.  ENT: ENT exam normal, no neck nodes or sinus tenderness  Cardiovascular: negative, PMI normal. No lifts, heaves, or thrills. RRR. No murmurs, clicks gallops or rub  Respiratory: negative, Percussion normal. Good diaphragmatic excursion. Lungs clear  Gastrointestinal: Abdomen soft, non-tender. BS normal. No masses, organomegaly  : Deferred  Musculoskeletal: extremities normal- no gross deformities noted, gait normal and normal muscle tone  Skin: no suspicious lesions or rashes  Neurologic: Gait normal. Reflexes normal and symmetric. Sensation grossly WNL.  Psychiatric: mentation appears normal and affect normal/bright not suicidal and homicidal       ED Course                     "     Results for orders placed or performed during the hospital encounter of 10/27/22 (from the past 24 hour(s))   Symptomatic; Unknown Influenza A/B & SARS-CoV2 (COVID-19) Virus PCR Multiplex Nose    Specimen: Nose; Swab   Result Value Ref Range    Influenza A PCR Negative Negative    Influenza B PCR Negative Negative    RSV PCR Negative Negative    SARS CoV2 PCR Negative Negative    Narrative    Testing was performed using the Xpert Xpress CoV2/Flu/RSV Assay on the [facility] GeneXpert Instrument. This test should be ordered for the detection of SARS-CoV-2 and influenza viruses in individuals who meet clinical and/or epidemiological criteria. Test performance is unknown in asymptomatic patients. This test is for in vitro diagnostic use under the FDA EUA for laboratories certified under CLIA to perform high or moderate complexity testing. This test has not been FDA cleared or approved. A negative result does not rule out the presence of PCR inhibitors in the specimen or target RNA in concentration below the limit of detection for the assay. If only one viral target is positive but coinfection with multiple targets is suspected, the sample should be re-tested with another FDA cleared, approved, or authorized test, if coinfection would change clinical management. This test was validated by the [facility]. These laboratories are certified under the Clinical Laboratory Improvement Amendments of 1988 (CLIA-88) as qualified to perform high complexity laboratory testing.       Medications - No data to display    Assessments & Plan (with Medical Decision Making)     I have reviewed the nursing notes.    I have reviewed the findings, diagnosis, plan and need for follow up with the patient.  Differential diagnosis at this point includes: infection/sepsis, CNS lesions/bleed, atypical migraine, encephalopathy, hypoglycemia, metabolic derangements, electrolyte disturbances, medication effects, substance abuse,  seizure, CVA-TIA, dementia-delirium, acute blood loss, as well as other etiologies.    Pleasant gentleman alert oriented answers questions appropriately is not always has hallucinations and he is having some voices but they are not command hallucinations he is more feeling unsafe-voice he is nervous about [place] the cities when his grandmother has cardiac surgery other than that he is not suicidal is not homicidal he was evaluated by [facility]. DEC felt as though the patient could follow-up on an outpatient basis in agreement as well. Please see their report for details.  I was not able to review of the patient's clinical at 1321 as the patient's note has not been completed.  I explained my diagnostic considerations and recommendations and the patient voiced an understanding and was in agreement with the treatment plan. All questions were answered. We discussed potential side effects of any prescribed or recommended therapies, as well as expectations for response to treatments.       New Prescriptions    No medications on file       Final diagnoses:   Other social stressor   Hearing voices       10/27/2022   [facility] [place]     Wilmar Nayak PA-C  10/27/22 8671

## 2022-10-27 NOTE — PROGRESS NOTES
Assessment & Plan   Problem List Items Addressed This Visit        Behavioral    Active autistic disorder   Other Visit Diagnoses     Anxiety    -  Primary    Relevant Orders    CBC and Differential (Completed)    TSH Reflex GH (Completed)    Comprehensive Metabolic Panel (Completed)    Vitamin D Total (Completed)    Iron binding panel (Completed)    Auditory hallucination        Relevant Orders    CBC and Differential (Completed)    TSH Reflex GH (Completed)    Comprehensive Metabolic Panel (Completed)    Vitamin D Total (Completed)    Iron binding panel (Completed)      Updated labs due to increasing anxiety and auditory hallucinations, these were stable other than mildly low vitamin D level.  I did call crisis response today who stated they are unable to come to the clinic to provide any evaluation and it was recommended that he go to the emergency room for [facility] assessment.  I did talk to patient and he would like to move forward with this due to his concerns about the hallucinations.  Report was given to the emergency room providers and he was taken over by nursing.    Shortly after patient was admitted to the emergency room, Tom from [facility] called and stated he was mistaken and they can come to the clinic to provide assistance during the day.  He did apologize for this.  Patient was already in the emergency room so we continued with his care over there.        36 minutes spent on the date of the encounter doing chart review, history and exam, documentation and further activities per the note           No follow-ups on file.    ABI Jin [facility] is a 22 year old, presenting for the following health issues:  Anxiety      History of Present Illness       Reason for visit:  Stress and Anxiety    He eats 0-1 servings of fruits and vegetables daily.He consumes 0 sweetened beverage(s) daily.He exercises with enough effort to increase his heart rate 30 to 60  "minutes per day.  He exercises with enough effort to increase his heart rate 3 or less days per week.   He is taking medications regularly.  Today's ROSMERY-7 Score: 19       He comes in today with concerns about worsening anxiety and stress.  He reports his grandma has had heart issues and that had to go down to [place] often for this.  He is also very about his mom with her thyroid issues and chronic tobacco use.  He typically is very physically active but has had decreased energy over the past couple weeks and not wanting to do as much is normal.  Recently he started hearing voices.  He reports these are auditory hallucinations and he thought these were related to his autism, typically these tell him about things.  Lately he has had the voices telling him negative thoughts.  They have been telling him to harm himself or others.  He feels he is able to control these but sometimes these are very troublesome and has him very worried.  He has not having any suicidal ideation/homicidal ideations.    Review of Systems   See above      Objective    /74   Pulse 112   Temp 99.2  F (37.3  C)   Resp 16   Ht 1.664 m (5' 5.5\")   Wt 82 kg (180 lb 12.8 oz)   SpO2 96%   BMI 29.63 kg/m    Body mass index is 29.63 kg/m .  Physical Exam   GENERAL: healthy, alert and no distress  EYES: Eyes grossly normal to inspection  RESP: lungs clear to auscultation - no rales, rhonchi or wheezes  CV: regular rate and rhythm, normal S1 S2  NEURO: Normal strength and tone, mentation intact and speech normal  PSYCH: mentation appears normal, affect normal/bright    Results for orders placed or performed in visit on 10/27/22 (from the past 24 hour(s))   CBC and Differential    Narrative    The following orders were created for panel order CBC and Differential.  Procedure                               Abnormality         Status                     ---------                               -----------         ------                     CBC " with platelets and d...[[id number]]                      Final result                 Please view results for these tests on the individual orders.   TSH Reflex GH   Result Value Ref Range    TSH 1.55 0.40 - 4.00 mU/L   Comprehensive Metabolic Panel   Result Value Ref Range    Sodium 135 134 - 144 mmol/L    Potassium 4.1 3.5 - 5.1 mmol/L    Chloride 100 98 - 107 mmol/L    Carbon Dioxide (CO2) 29 21 - 31 mmol/L    Anion Gap 6 3 - 14 mmol/L    Urea Nitrogen 11 7 - 25 mg/dL    Creatinine 1.02 0.70 - 1.30 mg/dL    Calcium 10.2 8.6 - 10.3 mg/dL    Glucose 91 70 - 105 mg/dL    Alkaline Phosphatase 58 34 - 104 U/L    AST 19 13 - 39 U/L    ALT 30 7 - 52 U/L    Protein Total 7.9 6.4 - 8.9 g/dL    Albumin 4.7 3.5 - 5.7 g/dL    Bilirubin Total 0.7 0.3 - 1.0 mg/dL    GFR Estimate >90 >60 mL/min/1.73m2   Vitamin D Total   Result Value Ref Range    Vitamin D, Total (25-Hydroxy) 27 (L) 30 - 100 ug/L   Iron binding panel   Result Value Ref Range    Transferrin 197 (L) 203 - 362 mg/dL    Iron 68 50 - 212 ug/dL    UIBC (Unsaturated) 207.80 mg/dL    Iron Binding Capacity 275.80 245.00 - 400.00 ug/dL    Iron Saturation 25 20 - 55 %   CBC with platelets and differential   Result Value Ref Range    WBC Count 8.3 4.0 - 11.0 10e3/uL    RBC Count 5.46 4.40 - 5.90 10e6/uL    Hemoglobin 14.8 13.3 - 17.7 g/dL    Hematocrit 44.6 40.0 - 53.0 %    MCV 82 78 - 100 fL    MCH 27.1 26.5 - 33.0 pg    MCHC 33.2 31.5 - 36.5 g/dL    RDW 12.5 10.0 - 15.0 %    Platelet Count 287 150 - 450 10e3/uL    % Neutrophils 74 %    % Lymphocytes 17 %    % Monocytes 7 %    % Eosinophils 1 %    % Basophils 1 %    % Immature Granulocytes 0 %    NRBCs per 100 WBC 0 <1 /100    Absolute Neutrophils 6.2 1.6 - 8.3 10e3/uL    Absolute Lymphocytes 1.4 0.8 - 5.3 10e3/uL    Absolute Monocytes 0.5 0.0 - 1.3 10e3/uL    Absolute Eosinophils 0.1 0.0 - 0.7 10e3/uL    Absolute Basophils 0.1 0.0 - 0.2 10e3/uL    Absolute Immature Granulocytes 0.0 <=0.4 10e3/uL    Absolute NRBCs 0.0  10e3/uL

## 2022-10-27 NOTE — TELEPHONE ENCOUNTER
Please call the patient.  He would like an RX - Vitamin D.   His pharmacy is [facility].    He has other  medications questions too         Sharon Durham on 10/27/2022 at 2:18 PM

## 2022-10-27 NOTE — PROGRESS NOTES
:    Patient presented to the ED with having hallucinations.    [facility] assessment completed and they recommended outpatient treatment.    Patient discharged to home with [facility] follow up.    PAOLO Dent on 10/27/2022 at 2:31 PM

## 2022-10-27 NOTE — ED TRIAGE NOTES
"Pt to ER from clinic with concerns from clinic provider about pts hallucinations.  Pt admits to having hallucinations, though adamantly denies those voices are telling him to harm himself or anyone else.  Pt denies SI, states \"no way would I want to do that, Im way to young\". States had a brief thought of self harm many years ago. Pt is pleasant, calm, cooperative.     Triage Assessment     Row Name 10/27/22 6241       Triage Assessment (Adult)    Airway WDL WDL       Respiratory WDL    Respiratory WDL WDL       Skin Circulation/Temperature WDL    Skin Circulation/Temperature WDL WDL       Cardiac WDL    Cardiac WDL WDL       Peripheral/Neurovascular WDL    Peripheral Neurovascular WDL WDL       Cognitive/Neuro/Behavioral WDL    Cognitive/Neuro/Behavioral WDL all    Level of Consciousness alert    Arousal Level opens eyes spontaneously    Orientation oriented x 4    Mood/Behavior cooperative       Pupils (CN II)    Pupil PERRLA no       [place] Coma Scale    Best Eye Response 4-->(E4) spontaneous    Best Motor Response 6-->(M6) obeys commands    Best Verbal Response 5-->(V5) oriented    Jhon Coma Scale Score 15              "

## 2022-10-27 NOTE — TELEPHONE ENCOUNTER
Spoke with patient and he is requesting a prescription for vitamin D sent to [facility] patient is aware this is OTC but wants to see if his insurance will pay for it. Patient is also wanting to know if he can start taking any medications before [facility] gets a hold of him. He would like to take mediations long term. He is also requesting a prescription for hallucinations.  Sharon Bergman LPN

## 2022-10-28 ENCOUNTER — TELEPHONE (OUTPATIENT)
Dept: FAMILY MEDICINE | Facility: OTHER | Age: 22
End: 2022-10-28

## 2022-10-28 DIAGNOSIS — F41.9 ANXIETY: Primary | ICD-10-CM

## 2022-10-28 RX ORDER — CHOLECALCIFEROL (VITAMIN D3) 50 MCG
1 TABLET ORAL DAILY
Qty: 90 TABLET | Refills: 4 | Status: SHIPPED | OUTPATIENT
Start: 2022-10-28 | End: 2023-11-17

## 2022-10-28 NOTE — TELEPHONE ENCOUNTER
Patient was in contact with [facility] last night but he is wanting to see about starting some medication.    Rosamaria Vidal LPN on 10/28/2022 at 3:49 PM

## 2022-10-28 NOTE — TELEPHONE ENCOUNTER
Wondering if PCP will be ok with giving patient medications for his stress and everything going on?      Please call patient.    Valentine Epstein on 10/28/2022 at 3:35 PM

## 2022-10-29 RX ORDER — HYDROXYZINE HYDROCHLORIDE 25 MG/1
25 TABLET, FILM COATED ORAL 3 TIMES DAILY PRN
Qty: 90 TABLET | Refills: 0 | Status: SHIPPED | OUTPATIENT
Start: 2022-10-29

## 2022-10-29 NOTE — TELEPHONE ENCOUNTER
Please let patient know I sent in hydroxyzine that can be used 3 times daily as needed to help with his anxiety.  He should follow back up in clinic with me for discussion of other long-term treatment options. ABI Jin CNP on 10/29/2022 at 3:44 PM

## 2022-10-31 ENCOUNTER — NURSE TRIAGE (OUTPATIENT)
Dept: FAMILY MEDICINE | Facility: OTHER | Age: 22
End: 2022-10-31

## 2022-10-31 NOTE — TELEPHONE ENCOUNTER
Patient contacted and informed of below. He states he picked up Hydroxyzine but he has been hallucinating. He is requesting a medication for hallucinations. He was informed Raquel Elliott is out of the clinic today. He states he is ok to wait for her return.     Mariama Thomas, TO on 10/31/2022 at 1:00 PM

## 2022-10-31 NOTE — TELEPHONE ENCOUNTER
"Patient called back, confirmed name and .     Patient reports that he is doing much better and is \"fine\". He reports hearing voices less frequently and they don't bother him, feels he has more control over them. Patient reported that he feels very safe and is not currently worried about hallucination symptoms. He did not sound to be in any distress. Answered patient questions about hydroxyzine which he plans to  tomorrow. He does have an appointment with Raquel on . Writer reiterated safety plan in 10/27/22 ED visit to call 911, triage line, or come into be seen with concerns in meantime, patient verbalized understanding and had no further questions or concerns.     Lubna Alejo RN .............. 10/31/2022  2:25 PM    "

## 2022-10-31 NOTE — TELEPHONE ENCOUNTER
"S-(situation): See MyChart encounter related to hydroxyzine. Message from [facility] states: \"Patient contacted and informed of below. He states he picked up Hydroxyzine but he has been hallucinating. He is requesting a medication for hallucinations. He was informed Raquel Elliott is out of the clinic today. He states he is ok to wait for her return.\"     Following up with triage since unclear if hallucinations is referring to recent ED visit or if this is a new episode.    Left message requesting callback x1.     Lubna Alejo RN .............. 10/31/2022  1:59 PM    "

## 2022-10-31 NOTE — TELEPHONE ENCOUNTER
See triage encounter due to hallucinations.     Lubna Alejo RN .............. 10/31/2022  1:50 PM

## 2022-11-01 NOTE — TELEPHONE ENCOUNTER
He will need to be seen for consideration of treatment for hallucinations. This would be best with psychiatry but I certainly can talk to him in clinic. Please make sure he is getting an appointment with mental health set up as well. ABI Jin CNP on 11/1/2022 at 7:44 AM

## 2022-11-02 NOTE — TELEPHONE ENCOUNTER
Patient was notified of PCP's recommendation. Patient has appointment scheduled for 11/11/22. He will keep that appointment to follow up with PCP.    Rosamaria Vidal LPN on 11/2/2022 at 1:31 PM

## 2022-11-04 ENCOUNTER — NURSE TRIAGE (OUTPATIENT)
Dept: FAMILY MEDICINE | Facility: OTHER | Age: 22
End: 2022-11-04

## 2022-11-04 NOTE — TELEPHONE ENCOUNTER
S-(situation): Nausea     B-(background): Patient reports he ate lasagna this morning that was left out overnight. Patient has diagnosis of active autistic disorder- high functioning    A-(assessment): Mild nausea. No other symptoms or vomiting.     R-(recommendations): Per protocol, patient was instructed he should be able to managed this at home. Discussed fluids/ diet to help with nausea. Discussed calling back if symptoms worsen or with uncontrolled vomiting- verbalized understanding.    Corinne R Thayer, RN on 11/4/2022 at 1:18 PM    Reason for Disposition    Unexplained nausea    Additional Information    Negative: Shock suspected (e.g., cold/pale/clammy skin, too weak to stand, low BP, rapid pulse)    Negative: Sounds like a life-threatening emergency to the triager    Negative: Nausea or vomiting and pregnancy < 20 weeks    Negative: Menstrual Period - Missed or Late (i.e., pregnancy suspected)    Negative: Heat exhaustion suspected (i.e., dehydration from heat exposure)    Negative: Anxiety or stress suspected (i.e., nausea with anxiety attacks or stressful situations)    Negative: Traumatic Brain Injury (TBI) suspected    Negative: Vomiting occurs    Negative: Other symptom is present, see that guideline.  (e.g., chest pain, headache, dizziness, abdominal pain, colds, sore throat, etc.).    Negative: Unable to walk, or can only walk with assistance (e.g., requires support)    Negative: Difficulty breathing    Negative: Insulin-dependent diabetes (Type I) and glucose > 400 mg/dL (22 mmol/L)    Negative: Drinking very little and dehydration suspected (e.g., no urine > 12 hours, very dry mouth, very lightheaded)    Negative: Patient sounds very sick or weak to the triager    Negative: Fever > 104 F  (40 C)    Negative: Fever > 101 F  (38.3 C) and over 60 years of age    Negative: Fever > 100.0 F  (37.8 C) and bedridden (e.g., nursing home patient, CVA, chronic illness, recovering from surgery)    Negative:  "Fever > 100.0 F  (37.8 C) and diabetes mellitus or weak immune system (e.g., HIV positive, cancer chemo, splenectomy, chronic steroids)    Negative: Taking any of the following medications: digoxin (Lanoxin), lithium, theophylline, phenytoin (Dilantin)    Negative: Yellowish color of the skin or white of the eye (i.e., jaundice)    Negative: Fever present > 3 days (72 hours)    Negative: Patient wants to be seen    Negative: Receiving cancer chemotherapy medication    Negative: Taking prescription medication that could cause nausea (e.g., narcotics/opiates, antibiotics, OCPs, many others)    Negative: Nausea lasts > 1 week    Negative: Substance use (drug use) or unhealthy alcohol use, known or suspected    Negative: Nausea is a chronic symptom (recurrent or ongoing AND present > 4 weeks)    Answer Assessment - Initial Assessment Questions  1. NAUSEA SEVERITY: \"How bad is the nausea?\" (e.g., mild, moderate, severe; dehydration, weight loss)    - MILD: loss of appetite without change in eating habits    - MODERATE: decreased oral intake without significant weight loss, dehydration, or malnutrition    - SEVERE: inadequate caloric or fluid intake, significant weight loss, symptoms of dehydration      mild  2. ONSET: \"When did the nausea begin?\"      This morning  3. VOMITING: \"Any vomiting?\" If Yes, ask: \"How many times today?\"      No vomiting  4. RECURRENT SYMPTOM: \"Have you had nausea before?\" If Yes, ask: \"When was the last time?\" \"What happened that time?\"      no  5. CAUSE: \"What do you think is causing the nausea?\"      Eating \"old lasagna\"  6. PREGNANCY: \"Is there any chance you are pregnant?\" (e.g., unprotected intercourse, missed birth control pill, broken condom)      NA    Protocols used: NAUSEA-A-OH    "

## 2022-11-04 NOTE — TELEPHONE ENCOUNTER
Patient called stating that he ate old lasagna and is feeling nausea. What can he do?  Margot Tinsley on 11/4/2022 at 1:06 PM

## 2022-11-05 ENCOUNTER — HOSPITAL ENCOUNTER (EMERGENCY)
Facility: OTHER | Age: 22
Discharge: HOME | End: 2022-11-05
Attending: PHYSICIAN ASSISTANT | Admitting: PHYSICIAN ASSISTANT
Payer: COMMERCIAL

## 2022-11-05 VITALS
WEIGHT: 170 LBS | TEMPERATURE: 97.5 F | HEART RATE: 92 BPM | OXYGEN SATURATION: 100 % | RESPIRATION RATE: 16 BRPM | SYSTOLIC BLOOD PRESSURE: 109 MMHG | BODY MASS INDEX: 28.29 KG/M2 | DIASTOLIC BLOOD PRESSURE: 56 MMHG

## 2022-11-05 DIAGNOSIS — N30.01 ACUTE CYSTITIS WITH HEMATURIA: ICD-10-CM

## 2022-11-05 DIAGNOSIS — N39.0 URINARY TRACT INFECTION: ICD-10-CM

## 2022-11-05 LAB
ALBUMIN SERPL-MCNC: 4.7 G/DL (ref 3.5–5.7)
ALBUMIN UR-MCNC: 30 MG/DL
ALP SERPL-CCNC: 49 U/L (ref 34–104)
ALT SERPL W P-5'-P-CCNC: 30 U/L (ref 7–52)
ANION GAP SERPL CALCULATED.3IONS-SCNC: 13 MMOL/L (ref 3–14)
APPEARANCE UR: ABNORMAL
AST SERPL W P-5'-P-CCNC: 23 U/L (ref 13–39)
BASOPHILS # BLD AUTO: 0.1 10E3/UL (ref 0–0.2)
BASOPHILS NFR BLD AUTO: 1 %
BILIRUB DIRECT SERPL-MCNC: 0.3 MG/DL (ref 0–0.2)
BILIRUB SERPL-MCNC: 0.9 MG/DL (ref 0.3–1)
BILIRUB UR QL STRIP: NEGATIVE
BUN SERPL-MCNC: 13 MG/DL (ref 7–25)
C TRACH DNA SPEC QL PROBE+SIG AMP: NEGATIVE
CALCIUM SERPL-MCNC: 9.9 MG/DL (ref 8.6–10.3)
CHLORIDE BLD-SCNC: 100 MMOL/L (ref 98–107)
CO2 SERPL-SCNC: 21 MMOL/L (ref 21–31)
COLOR UR AUTO: YELLOW
CREAT SERPL-MCNC: 1.05 MG/DL (ref 0.7–1.3)
EOSINOPHIL # BLD AUTO: 0 10E3/UL (ref 0–0.7)
EOSINOPHIL NFR BLD AUTO: 1 %
ERYTHROCYTE [DISTWIDTH] IN BLOOD BY AUTOMATED COUNT: 12 % (ref 10–15)
FLUAV RNA SPEC QL NAA+PROBE: NEGATIVE
FLUBV RNA RESP QL NAA+PROBE: NEGATIVE
GFR SERPL CREATININE-BSD FRML MDRD: >90 ML/MIN/1.73M2
GLUCOSE BLD-MCNC: 92 MG/DL (ref 70–105)
GLUCOSE UR STRIP-MCNC: NEGATIVE MG/DL
HCT VFR BLD AUTO: 45.3 % (ref 40–53)
HGB BLD-MCNC: 15.5 G/DL (ref 13.3–17.7)
HGB UR QL STRIP: NEGATIVE
HOLD SPECIMEN: NORMAL
IMM GRANULOCYTES # BLD: 0 10E3/UL
IMM GRANULOCYTES NFR BLD: 0 %
KETONES UR STRIP-MCNC: 80 MG/DL
LEUKOCYTE ESTERASE UR QL STRIP: ABNORMAL
LYMPHOCYTES # BLD AUTO: 1 10E3/UL (ref 0.8–5.3)
LYMPHOCYTES NFR BLD AUTO: 14 %
MAGNESIUM SERPL-MCNC: 1.8 MG/DL (ref 1.9–2.7)
MCH RBC QN AUTO: 27.3 PG (ref 26.5–33)
MCHC RBC AUTO-ENTMCNC: 34.2 G/DL (ref 31.5–36.5)
MCV RBC AUTO: 80 FL (ref 78–100)
MONOCYTES # BLD AUTO: 0.4 10E3/UL (ref 0–1.3)
MONOCYTES NFR BLD AUTO: 5 %
MONOCYTES NFR BLD AUTO: NEGATIVE %
MUCOUS THREADS #/AREA URNS LPF: PRESENT /LPF
N GONORRHOEA DNA SPEC QL NAA+PROBE: NEGATIVE
NEUTROPHILS # BLD AUTO: 5.9 10E3/UL (ref 1.6–8.3)
NEUTROPHILS NFR BLD AUTO: 79 %
NITRATE UR QL: POSITIVE
NRBC # BLD AUTO: 0 10E3/UL
NRBC BLD AUTO-RTO: 0 /100
PH UR STRIP: 6 [PH] (ref 5–9)
PLATELET # BLD AUTO: 309 10E3/UL (ref 150–450)
POTASSIUM BLD-SCNC: 3.8 MMOL/L (ref 3.5–5.1)
PROT SERPL-MCNC: 7.5 G/DL (ref 6.4–8.9)
RBC # BLD AUTO: 5.67 10E6/UL (ref 4.4–5.9)
RBC URINE: 2 /HPF
RSV RNA SPEC NAA+PROBE: NEGATIVE
SARS-COV-2 RNA RESP QL NAA+PROBE: NEGATIVE
SODIUM SERPL-SCNC: 134 MMOL/L (ref 134–144)
SP GR UR STRIP: 1.03 (ref 1–1.03)
UROBILINOGEN UR STRIP-MCNC: NORMAL MG/DL
WBC # BLD AUTO: 7.4 10E3/UL (ref 4–11)
WBC URINE: 4 /HPF

## 2022-11-05 PROCEDURE — 96365 THER/PROPH/DIAG IV INF INIT: CPT | Performed by: PHYSICIAN ASSISTANT

## 2022-11-05 PROCEDURE — 83735 ASSAY OF MAGNESIUM: CPT | Performed by: PHYSICIAN ASSISTANT

## 2022-11-05 PROCEDURE — 87491 CHLMYD TRACH DNA AMP PROBE: CPT | Performed by: PHYSICIAN ASSISTANT

## 2022-11-05 PROCEDURE — 99284 EMERGENCY DEPT VISIT MOD MDM: CPT | Mod: 25 | Performed by: PHYSICIAN ASSISTANT

## 2022-11-05 PROCEDURE — 87086 URINE CULTURE/COLONY COUNT: CPT | Performed by: PHYSICIAN ASSISTANT

## 2022-11-05 PROCEDURE — 96361 HYDRATE IV INFUSION ADD-ON: CPT | Performed by: PHYSICIAN ASSISTANT

## 2022-11-05 PROCEDURE — C9803 HOPD COVID-19 SPEC COLLECT: HCPCS | Performed by: PHYSICIAN ASSISTANT

## 2022-11-05 PROCEDURE — 258N000003 HC RX IP 258 OP 636: Performed by: PHYSICIAN ASSISTANT

## 2022-11-05 PROCEDURE — 87637 SARSCOV2&INF A&B&RSV AMP PRB: CPT | Performed by: PHYSICIAN ASSISTANT

## 2022-11-05 PROCEDURE — 96375 TX/PRO/DX INJ NEW DRUG ADDON: CPT | Performed by: PHYSICIAN ASSISTANT

## 2022-11-05 PROCEDURE — 250N000011 HC RX IP 250 OP 636: Performed by: PHYSICIAN ASSISTANT

## 2022-11-05 PROCEDURE — 82248 BILIRUBIN DIRECT: CPT | Performed by: PHYSICIAN ASSISTANT

## 2022-11-05 PROCEDURE — 86308 HETEROPHILE ANTIBODY SCREEN: CPT | Performed by: PHYSICIAN ASSISTANT

## 2022-11-05 PROCEDURE — 85025 COMPLETE CBC W/AUTO DIFF WBC: CPT | Performed by: PHYSICIAN ASSISTANT

## 2022-11-05 PROCEDURE — 81001 URINALYSIS AUTO W/SCOPE: CPT | Performed by: PHYSICIAN ASSISTANT

## 2022-11-05 PROCEDURE — 87591 N.GONORRHOEAE DNA AMP PROB: CPT | Performed by: PHYSICIAN ASSISTANT

## 2022-11-05 PROCEDURE — 99284 EMERGENCY DEPT VISIT MOD MDM: CPT | Performed by: PHYSICIAN ASSISTANT

## 2022-11-05 RX ORDER — ONDANSETRON 4 MG/1
4 TABLET, ORALLY DISINTEGRATING ORAL EVERY 8 HOURS PRN
Qty: 5 TABLET | Refills: 0 | Status: SHIPPED | OUTPATIENT
Start: 2022-11-05 | End: 2023-01-20

## 2022-11-05 RX ORDER — CEPHALEXIN 500 MG/1
500 CAPSULE ORAL 4 TIMES DAILY
Qty: 30 CAPSULE | Refills: 0 | Status: SHIPPED | OUTPATIENT
Start: 2022-11-05 | End: 2022-11-07

## 2022-11-05 RX ORDER — ONDANSETRON 2 MG/ML
4 INJECTION INTRAMUSCULAR; INTRAVENOUS ONCE
Status: COMPLETED | OUTPATIENT
Start: 2022-11-05 | End: 2022-11-05

## 2022-11-05 RX ORDER — CEFTRIAXONE SODIUM 1 G/50ML
1 INJECTION, SOLUTION INTRAVENOUS ONCE
Status: COMPLETED | OUTPATIENT
Start: 2022-11-05 | End: 2022-11-05

## 2022-11-05 RX ADMIN — CEFTRIAXONE SODIUM 1 G: 1 INJECTION, SOLUTION INTRAVENOUS at 15:56

## 2022-11-05 RX ADMIN — SODIUM CHLORIDE 1000 ML: 9 INJECTION, SOLUTION INTRAVENOUS at 13:17

## 2022-11-05 RX ADMIN — ONDANSETRON 4 MG: 2 INJECTION INTRAMUSCULAR; INTRAVENOUS at 13:17

## 2022-11-05 ASSESSMENT — ENCOUNTER SYMPTOMS
CONFUSION: 0
SHORTNESS OF BREATH: 0
ABDOMINAL PAIN: 0
FEVER: 0
VOMITING: 0
DYSURIA: 0
FATIGUE: 1
NAUSEA: 1

## 2022-11-05 ASSESSMENT — ACTIVITIES OF DAILY LIVING (ADL)
ADLS_ACUITY_SCORE: 35
ADLS_ACUITY_SCORE: 33

## 2022-11-05 NOTE — ED PROVIDER NOTES
History     Chief Complaint   Patient presents with     Nausea     HPI  Vinayak Leavitt is a 22 year old male who presents to the ED for evaluation of nausea. He has general complaints with general illness, intermittent fevers. He reports slight dysuria, some dehydration, and nausea. No vomiting or diarrhea. He had some lasagna the other night and wonders if his illness is due to that.     Allergies:  No Known Allergies    Problem List:    Patient Active Problem List    Diagnosis Date Noted     Active autistic disorder 02/13/2018     Priority: Medium     Overview:   High functioning, photogenic memory.  Diagnosed in early childhood by school psychologist.  Has an IEP and a para at school.  Doing grade level schoolwork.         Acne 05/17/2017     Priority: Medium        Past Medical History:    Past Medical History:   Diagnosis Date     Otitis media      Streptococcal pharyngitis        Past Surgical History:    Past Surgical History:   Procedure Laterality Date     OTHER SURGICAL HISTORY      144621,OTHER,lacerated tendon     OTHER SURGICAL HISTORY      [id number],OTHER,Times two     TONSILLECTOMY      06/2017       Family History:    Family History   Problem Relation Age of Onset     Heart Disease Mother         Heart Disease,cardiac coronary anomaly     Thyroid nodules Mother      Other - See Comments Father         MVA     Attention Deficit Disorder Maternal Half-Sister         ADD / ADHD     Substance Abuse Maternal Half-Sister      Genetic Disorder No family hx of         Genetic,No family history of asthma, allergy or atopic dermatitis.       Social History:  Marital Status:  Single [1]  Social History     Tobacco Use     Smoking status: Never     Smokeless tobacco: Never     Tobacco comments:     Quit smoking: mom smokes around him every once in awhile   Vaping Use     Vaping Use: Never used   Substance Use Topics     Alcohol use: No     Alcohol/week: 0.0 standard drinks     Drug use: No         Medications:    acetaminophen (TYLENOL) 500 MG tablet  cephALEXin (KEFLEX) 500 MG capsule  ibuprofen (ADVIL/MOTRIN) 200 MG tablet  metroNIDAZOLE (FLAGYL) 500 MG tablet  ondansetron (ZOFRAN ODT) 4 MG ODT tab  penicillin V (VEETID) 500 MG tablet  SODIUM FLUORIDE 5000 PPM 1.1 % PSTE dental paste  vitamin D3 (CHOLECALCIFEROL) 50 mcg (2000 units) tablet  hydrOXYzine (ATARAX) 25 MG tablet          Review of Systems   Constitutional: Positive for fatigue. Negative for fever.   HENT: Negative for congestion.    Eyes: Negative for visual disturbance.   Respiratory: Negative for shortness of breath.    Cardiovascular: Negative for chest pain.   Gastrointestinal: Positive for nausea. Negative for abdominal pain and vomiting.   Genitourinary: Negative for dysuria.   Psychiatric/Behavioral: Negative for confusion.       Physical Exam   BP: (!) 140/52  Pulse: 109  Temp: 97.5  F (36.4  C)  Resp: 16  Weight: 77.1 kg (170 lb)  SpO2: 100 %      Physical Exam  Constitutional:       General: He is not in acute distress.     Appearance: He is well-developed. He is not diaphoretic.   HENT:      Head: Normocephalic and atraumatic.   Eyes:      General: No scleral icterus.     Conjunctiva/sclera: Conjunctivae normal.   Cardiovascular:      Rate and Rhythm: Normal rate and regular rhythm.   Pulmonary:      Effort: Pulmonary effort is normal.      Breath sounds: Normal breath sounds.   Abdominal:      Palpations: Abdomen is soft.      Tenderness: There is no abdominal tenderness.   Musculoskeletal:         General: No deformity.      Cervical back: Neck supple.   Lymphadenopathy:      Cervical: No cervical adenopathy.   Skin:     General: Skin is warm and dry.      Coloration: Skin is not jaundiced.      Findings: No rash.   Neurological:      Mental Status: He is alert and oriented to person, place, and time. Mental status is at baseline.   Psychiatric:         Mood and Affect: Mood normal.         Behavior: Behavior normal.         ED  Course                 Procedures              Critical Care time:  none               Results for orders placed or performed during the hospital encounter of 11/05/22 (from the past 24 hour(s))   Symptomatic; Unknown Influenza A/B & SARS-CoV2 (COVID-19) Virus PCR Multiplex Nose    Specimen: Nose; Swab   Result Value Ref Range    Influenza A PCR Negative Negative    Influenza B PCR Negative Negative    RSV PCR Negative Negative    SARS CoV2 PCR Negative Negative    Narrative    Testing was performed using the Xpert Xpress CoV2/Flu/RSV Assay on the [employer] GeneXpert Instrument. This test should be ordered for the detection of SARS-CoV-2 and influenza viruses in individuals who meet clinical and/or epidemiological criteria. Test performance is unknown in asymptomatic patients. This test is for in vitro diagnostic use under the FDA EUA for laboratories certified under CLIA to perform high or moderate complexity testing. This test has not been FDA cleared or approved. A negative result does not rule out the presence of PCR inhibitors in the specimen or target RNA in concentration below the limit of detection for the assay. If only one viral target is positive but coinfection with multiple targets is suspected, the sample should be re-tested with another FDA cleared, approved, or authorized test, if coinfection would change clinical management. This test was validated by the [facility]. These laboratories are certified under the Clinical Laboratory Improvement Amendments of 1988 (CLIA-88) as qualified to perform high complexity laboratory testing.   CBC with platelets differential    Narrative    The following orders were created for panel order CBC with platelets differential.  Procedure                               Abnormality         Status                     ---------                               -----------         ------                     CBC with platelets and d...[[id number]]                       Final result                 Please view results for these tests on the individual orders.   Basic metabolic panel   Result Value Ref Range    Sodium 134 134 - 144 mmol/L    Potassium 3.8 3.5 - 5.1 mmol/L    Chloride 100 98 - 107 mmol/L    Carbon Dioxide (CO2) 21 21 - 31 mmol/L    Anion Gap 13 3 - 14 mmol/L    Urea Nitrogen 13 7 - 25 mg/dL    Creatinine 1.05 0.70 - 1.30 mg/dL    Calcium 9.9 8.6 - 10.3 mg/dL    Glucose 92 70 - 105 mg/dL    GFR Estimate >90 >60 mL/min/1.73m2   Magnesium   Result Value Ref Range    Magnesium 1.8 (L) 1.9 - 2.7 mg/dL   Hepatic panel   Result Value Ref Range    Bilirubin Total 0.9 0.3 - 1.0 mg/dL    Bilirubin Direct 0.3 (H) 0.0 - 0.2 mg/dL    Protein Total 7.5 6.4 - 8.9 g/dL    Albumin 4.7 3.5 - 5.7 g/dL    Alkaline Phosphatase 49 34 - 104 U/L    AST 23 13 - 39 U/L    ALT 30 7 - 52 U/L   Mononucleosis screen   Result Value Ref Range    Mononucleosis Screen Negative Negative   CBC with platelets and differential   Result Value Ref Range    WBC Count 7.4 4.0 - 11.0 10e3/uL    RBC Count 5.67 4.40 - 5.90 10e6/uL    Hemoglobin 15.5 13.3 - 17.7 g/dL    Hematocrit 45.3 40.0 - 53.0 %    MCV 80 78 - 100 fL    MCH 27.3 26.5 - 33.0 pg    MCHC 34.2 31.5 - 36.5 g/dL    RDW 12.0 10.0 - 15.0 %    Platelet Count 309 150 - 450 10e3/uL    % Neutrophils 79 %    % Lymphocytes 14 %    % Monocytes 5 %    % Eosinophils 1 %    % Basophils 1 %    % Immature Granulocytes 0 %    NRBCs per 100 WBC 0 <1 /100    Absolute Neutrophils 5.9 1.6 - 8.3 10e3/uL    Absolute Lymphocytes 1.0 0.8 - 5.3 10e3/uL    Absolute Monocytes 0.4 0.0 - 1.3 10e3/uL    Absolute Eosinophils 0.0 0.0 - 0.7 10e3/uL    Absolute Basophils 0.1 0.0 - 0.2 10e3/uL    Absolute Immature Granulocytes 0.0 <=0.4 10e3/uL    Absolute NRBCs 0.0 10e3/uL   Extra Tube    Narrative    The following orders were created for panel order Extra Tube.  Procedure                               Abnormality         Status                     ---------                                -----------         ------                     Extra Blue Top Tube[[id number]]                              Final result               Extra Serum Separator Tu...[[id number]]                      Final result               Extra Purple Top Tube[[id number]]                            Final result               Extra Green Top (Lithium...[[id number]]                      Final result                 Please view results for these tests on the individual orders.   Extra Blue Top Tube   Result Value Ref Range    Hold Specimen JIC    Extra Serum Separator Tube (SST)   Result Value Ref Range    Hold Specimen JIC    Extra Purple Top Tube   Result Value Ref Range    Hold Specimen JIC    Extra Green Top (Lithium Heparin) ON ICE   Result Value Ref Range    Hold Specimen JIC    UA with Microscopic reflex to Culture    Specimen: Urine, Midstream   Result Value Ref Range    Color Urine Yellow Colorless, Straw, Light Yellow, Yellow    Appearance Urine Slightly Cloudy (A) Clear    Glucose Urine Negative Negative mg/dL    Bilirubin Urine Negative Negative    Ketones Urine 80 (A) Negative mg/dL    Specific Gravity Urine 1.029 1.000 - 1.030    Blood Urine Negative Negative    pH Urine 6.0 5.0 - 9.0    Protein Albumin Urine 30 (A) Negative mg/dL    Urobilinogen Urine Normal Normal, 2.0 mg/dL    Nitrite Urine Positive (A) Negative    Leukocyte Esterase Urine Small (A) Negative    Mucus Urine Present (A) None Seen /LPF    RBC Urine 2 <=2 /HPF    WBC Urine 4 <=5 /HPF    Narrative    Urine Culture ordered based on laboratory criteria   Chlamydia trachomatis/Neisseria gonorrhoeae by PCR    Specimen: Urine, Voided   Result Value Ref Range    Chlamydia Trachomatis Negative Negative    Neisseria gonorrhoeae Negative Negative    Narrative    Assay performed using [employer] real-time, reverse-transcriptase PCR.       Medications   cefTRIAXone in d5w (ROCEPHIN) intermittent infusion 1 g (has no administration in time range)   0.9% sodium  chloride BOLUS (0 mLs Intravenous Stopped 11/5/22 1430)   ondansetron (ZOFRAN) injection 4 mg (4 mg Intravenous Given 11/5/22 1317)       Assessments & Plan (with Medical Decision Making)   Pt nontoxic in NAD. Heart, lung, bowel sounds normal, abd soft, nontender to palpation, nondistended. VSS, afebrile.     Urinalysis consistent with infection, negative GC, remainder studies appear well.    He is given a dose of Rocephin, fluids and Zofran.  He reports feeling much better than when he arrived.  We will send him home with some Keflex and Zofran and a urology follow-up.    Strict return precautions are given to the pt, they will return if symptoms are worsening or concerning. The pt understands and agrees with the plan and they are discharged.     Beny De León PA-C    I have reviewed the nursing notes.    I have reviewed the findings, diagnosis, plan and need for follow up with the patient.       New Prescriptions    CEPHALEXIN (KEFLEX) 500 MG CAPSULE    Take 1 capsule (500 mg) by mouth 4 times daily    ONDANSETRON (ZOFRAN ODT) 4 MG ODT TAB    Take 1 tablet (4 mg) by mouth every 8 hours as needed for nausea       Final diagnoses:   Urinary tract infection       11/5/2022   [facility] AND Beny Will PA  11/05/22 [id number]

## 2022-11-05 NOTE — DISCHARGE INSTRUCTIONS
Get plenty of fluids and rest.  As discussed, there is concern for possible urinary tract infection, however, the remainder of your studies appeared excellent.  We will treat you with antibiotics.  I did place referral for you to follow-up with urology for reassessment and to ensure that you are improving.

## 2022-11-06 ENCOUNTER — HOSPITAL ENCOUNTER (EMERGENCY)
Facility: OTHER | Age: 22
Discharge: HOME | End: 2022-11-06
Attending: EMERGENCY MEDICINE | Admitting: EMERGENCY MEDICINE
Payer: COMMERCIAL

## 2022-11-06 ENCOUNTER — HOSPITAL ENCOUNTER (OUTPATIENT)
Dept: CARDIOLOGY | Facility: OTHER | Age: 22
Discharge: HOME | End: 2022-11-06
Attending: EMERGENCY MEDICINE
Payer: COMMERCIAL

## 2022-11-06 VITALS
RESPIRATION RATE: 16 BRPM | WEIGHT: 175 LBS | HEART RATE: 108 BPM | HEIGHT: 66 IN | OXYGEN SATURATION: 97 % | TEMPERATURE: 98.3 F | DIASTOLIC BLOOD PRESSURE: 80 MMHG | SYSTOLIC BLOOD PRESSURE: 117 MMHG | BODY MASS INDEX: 28.12 KG/M2

## 2022-11-06 DIAGNOSIS — I47.10 PAROXYSMAL SUPRAVENTRICULAR TACHYCARDIA (H): ICD-10-CM

## 2022-11-06 PROCEDURE — 93244 EXT ECG>48HR<7D REV&INTERPJ: CPT | Performed by: INTERNAL MEDICINE

## 2022-11-06 PROCEDURE — 99283 EMERGENCY DEPT VISIT LOW MDM: CPT | Performed by: EMERGENCY MEDICINE

## 2022-11-06 PROCEDURE — 99284 EMERGENCY DEPT VISIT MOD MDM: CPT | Mod: 25 | Performed by: EMERGENCY MEDICINE

## 2022-11-06 PROCEDURE — 93005 ELECTROCARDIOGRAM TRACING: CPT | Mod: XU | Performed by: EMERGENCY MEDICINE

## 2022-11-06 PROCEDURE — 93242 EXT ECG>48HR<7D RECORDING: CPT

## 2022-11-06 PROCEDURE — 93010 ELECTROCARDIOGRAM REPORT: CPT | Performed by: INTERNAL MEDICINE

## 2022-11-06 ASSESSMENT — ACTIVITIES OF DAILY LIVING (ADL): ADLS_ACUITY_SCORE: 35

## 2022-11-06 NOTE — ED PROVIDER NOTES
[facility]  Emergency Department Visit Note    Tachycardia      History of Present Illness     HPI:  Vinayak Leavitt is a 22 year old male presenting with palpitations. These started 3 hours ago and are ongoing. The patient has had similar palpitation symptoms in the past. While palpitations are present, the patient had no chest pain, shortness of breath, fever, nausea. The patient states that there has been no recent medication change, change in caffeine intake, or use of recreational drugs. No recent illness or fevers.     Medications:  Prior to Admission medications    Medication Sig Last Dose Taking? Auth Provider Long Term End Date   acetaminophen (TYLENOL) 500 MG tablet Take 1,000 mg by mouth every 6 hours as needed 11/5/2022 Yes Reported, Patient     cephALEXin (KEFLEX) 500 MG capsule Take 1 capsule (500 mg) by mouth 4 times daily 11/6/2022 at 0900 Yes Beny De León PA     hydrOXYzine (ATARAX) 25 MG tablet Take 1 tablet (25 mg) by mouth 3 times daily as needed for anxiety More than a month Yes Raquel Elliott APRN CNP     ibuprofen (ADVIL/MOTRIN) 200 MG tablet Take 400 mg by mouth every 4 hours as needed for mild pain 11/5/2022 Yes Reported, Patient     metroNIDAZOLE (FLAGYL) 500 MG tablet Take 1 tablet by mouth every 6 hours Until gone 11/5/2022 Yes Reported, Patient     ondansetron (ZOFRAN ODT) 4 MG ODT tab Take 1 tablet (4 mg) by mouth every 8 hours as needed for nausea 11/6/2022 at 1000 Yes Beny De León PA     penicillin V (VEETID) 500 MG tablet  Unknown Yes Reported, Patient     SODIUM FLUORIDE 5000 PPM 1.1 % PSTE dental paste BRUSH WITH PEA SIZED AMOUNT IN THE MORNING AND AT NIGHT RIGHT BEFORE BED. EXPECTORATE EXCESS, DO NOT RINSE WITH WATER AFTER BRUSHING. 11/5/2022 Yes Reported, Patient     vitamin D3 (CHOLECALCIFEROL) 50 mcg (2000 units) tablet Take 1 tablet (50 mcg) by mouth daily 11/5/2022 Yes Wilmar Machado MD         Allergies:  No Known  "Allergies    Problem List:  Patient Active Problem List   Diagnosis     Acne     Active autistic disorder       Past Medical History:  Past Medical History:   Diagnosis Date     Otitis media     6/6/09,left- treated with Zithromax     Streptococcal pharyngitis     No Comments Provided       Past Surgical History:  Past Surgical History:   Procedure Laterality Date     OTHER SURGICAL HISTORY      179391,OTHER,lacerated tendon     OTHER SURGICAL HISTORY      [id number],OTHER,Times two     TONSILLECTOMY      06/2017       Social History:  Social History     Tobacco Use     Smoking status: Never     Smokeless tobacco: Never     Tobacco comments:     Quit smoking: mom smokes around him every once in awhile   Vaping Use     Vaping Use: Never used   Substance Use Topics     Alcohol use: No     Alcohol/week: 0.0 standard drinks     Drug use: No       Review of Systems:  Complete review of systems obtained and pertinent positive and negative findings noted in HPI. Review of systems otherwise negative.      Physical Exam     Vital signs: /76   Pulse 94   Temp 98.3  F (36.8  C) (Tympanic)   Resp 16   Ht 1.676 m (5' 6\")   Wt 79.4 kg (175 lb)   SpO2 97%   BMI 28.25 kg/m      Physical Exam:    General: awake and alert, comfortable  HEENT: atraumatic, no scleral injection, no nasal discharge, neck supple  Chest: clear to auscultation bilaterally without wheezes or crackles, non labored respirations, symmetric chest rise  Cardiovascular: tachycardic regular rate and rhythm, no murmurs or gallops  Abdomen: soft, nontender, no rebound or guarding, nondistended  Extremities: no deformities, edema, or tenderness  Skin: warm, dry, no rashes  Neuro: alert and oriented x 3, moving extremities x 4, ambulates without difficulty    EKG: normal sinus rhythm, normal intervals with no pro-arrythmic findings    Medical Decision Making & ED Course     Vinayak Leavitt is a 22 year old male presenting with palpitations. Differential " includes preventricular contractions, supraventricular tachycardia, atrial fibrillation, atrial flutter, Scot-Parkinson White, panic attack, generalized anxiety disorder, acute coronary syndrome, pulmonary embolism. Initial EKG revealed no arrhythmia o rtachycardia. History does not reveal a clear etiology or precipitating factor that would explain this episode of supraventricular tachycardia. He had extensive lab work up yesterday but are unrevealing. The patient has not been evaluated for palpitations or tachycardia in the past. Given the patient's age, history of present illness, and past medical history, there is a low suspicion of pulmonary embolism or acute coronary syndrome and the patient does not require further diagnostic testing at this time. Will place a ziopatch upon discharge. He is stable for further outpatient management. Patient given instructions on follow-up and warning signs for which to return to ED. All questions were answered and the patient is comfortable with plan for discharge. The patient was discharged in stable condition.     I have reviewed the patients ECG  and medical records.      Diagnosis & Disposition     Diagnosis:  Paroxysmal tachycardia    Disposition:  Home    MD Елена Aguiar Theresa M, MD  11/06/22 6466       Jocelyne Christiansen MD  11/14/22 6520

## 2022-11-06 NOTE — PATIENT INSTRUCTIONS
Date Placed on Pt:  11/06/22      Patient instructed not to:   -take baths, swim, sauna   -remove device prior to 7 days   -use electric blankets   -shower or sweat excessively within first 24 hours of device application  Patient instructed to:   -shower as needed   -be carefull when toweling off and dressing   -press button when cardiac symptoms occur   -document symptoms in log book   -remove and return device (send via mail to [employer])   -Call [employer] with any questions

## 2022-11-07 ENCOUNTER — HOSPITAL ENCOUNTER (EMERGENCY)
Facility: OTHER | Age: 22
Discharge: HOME | End: 2022-11-07
Attending: INTERNAL MEDICINE | Admitting: INTERNAL MEDICINE
Payer: COMMERCIAL

## 2022-11-07 VITALS
HEART RATE: 87 BPM | SYSTOLIC BLOOD PRESSURE: 123 MMHG | OXYGEN SATURATION: 98 % | TEMPERATURE: 98.5 F | BODY MASS INDEX: 28.12 KG/M2 | DIASTOLIC BLOOD PRESSURE: 73 MMHG | HEIGHT: 66 IN | RESPIRATION RATE: 18 BRPM | WEIGHT: 175 LBS

## 2022-11-07 DIAGNOSIS — I47.10 PAROXYSMAL SUPRAVENTRICULAR TACHYCARDIA (H): Primary | ICD-10-CM

## 2022-11-07 LAB
ATRIAL RATE - MUSE: 79 BPM
BACTERIA UR CULT: NO GROWTH
DIASTOLIC BLOOD PRESSURE - MUSE: NORMAL MMHG
INTERPRETATION ECG - MUSE: NORMAL
P AXIS - MUSE: 51 DEGREES
PR INTERVAL - MUSE: 164 MS
QRS DURATION - MUSE: 94 MS
QT - MUSE: 342 MS
QTC - MUSE: 392 MS
R AXIS - MUSE: 83 DEGREES
SYSTOLIC BLOOD PRESSURE - MUSE: NORMAL MMHG
T AXIS - MUSE: 22 DEGREES
VENTRICULAR RATE- MUSE: 79 BPM

## 2022-11-07 PROCEDURE — 99283 EMERGENCY DEPT VISIT LOW MDM: CPT | Performed by: INTERNAL MEDICINE

## 2022-11-07 PROCEDURE — 250N000013 HC RX MED GY IP 250 OP 250 PS 637: Performed by: INTERNAL MEDICINE

## 2022-11-07 PROCEDURE — 93005 ELECTROCARDIOGRAM TRACING: CPT | Performed by: INTERNAL MEDICINE

## 2022-11-07 RX ORDER — METOPROLOL SUCCINATE 25 MG/1
25 TABLET, EXTENDED RELEASE ORAL ONCE
Status: COMPLETED | OUTPATIENT
Start: 2022-11-07 | End: 2022-11-07

## 2022-11-07 RX ORDER — METOPROLOL SUCCINATE 25 MG/1
25 TABLET, EXTENDED RELEASE ORAL EVERY EVENING
Qty: 30 TABLET | Refills: 1 | Status: SHIPPED | OUTPATIENT
Start: 2022-11-07 | End: 2023-01-09

## 2022-11-07 RX ADMIN — METOPROLOL SUCCINATE 25 MG: 25 TABLET, EXTENDED RELEASE ORAL at 22:47

## 2022-11-07 NOTE — RESULT ENCOUNTER NOTE
"Final urine culture report shows \"NO GROWTH\" and is NEGATIVE.  [facility] Emergency Dept discharge antibiotic: Cephalexin (Keflex) 500 mg capsule, 1 capsule (500 mg) by mouth 4 times daily for 7 days.   [facility] Emergency Dept discharge Rx antibiotic for UTI only (Yes/No): Yes  RN to confirm if Patient took antibiotic within 3 days prior to urine culture collection.  Recommendations in treatment per [facility] ED Lab result Urine culture protocol.  "

## 2022-11-08 NOTE — DISCHARGE INSTRUCTIONS
Paroxysmal supraventricular tachycardia (H)  START:   - metoprolol succinate ER (TOPROL XL) 25 MG 24 hr tablet; Take 1 tablet (25 mg) by mouth every evening      Mail in your Ziopatch when it is done recording.     Keep follow-up with your primary provider as scheduled.

## 2022-11-08 NOTE — ED PROVIDER NOTES
Emergency Department Provider Note  : 2000 Age: 22 year old Sex: male MRN: [id number]    Chief Complaint   Patient presents with     Shortness of Breath       Medical Decision Making / Assessment / Plan   22 year old male presenting with PSVT    ED Course as of 22 2242   Mon  Patient evaluated.  ER visit yesterday he had PSVT.  Was given adenosine, this resolved.  Had EKG after the fact.  Had some additional fast heart rates today.  He was getting short of breath with this.  Currently wearing his heart monitor.  He has about another week until he plans to mail it in.  We discussed treatment options.  At this point he would like to start low-dose beta-blocker to help slow down his heart rate and hopefully prevent any additional tachycardia issues.  No signs of WPW on EKG from yesterday    Toprol-XL 25 mg tablet ordered for dose now.  Prescription sent to pharmacy to start tomorrow.    Discharged home with family in stable condition.        The patient and family were informed of the plan and verbalized understanding and agreed with the plan. The patient was given strict return to Emergency Department precautions as well as appropriate follow up instructions. The patient was discharged in stable condition.    New Prescriptions    METOPROLOL SUCCINATE ER (TOPROL XL) 25 MG 24 HR TABLET    Take 1 tablet (25 mg) by mouth every evening       Final diagnoses:   Paroxysmal supraventricular tachycardia (H)       Taj Palacio MD  2022   Emergency Department    Marisa Licea is a 22 year old male who presents at  9:46 PM with fast heartbeat this evening that ended up causing some shortness of breath symptoms again.  He had similar problems yesterday.  He was found to have fast heart rates.  This was resolved after treating with adenosine.    Given his recurrent symptoms tonight, he came back in because he was concerned.    Currently afebrile.  Denies chest pain, heaviness.   "No rashes or sores.  No wheezing.  No cough.    I have reviewed the Medications, Allergies, Past Medical and Surgical History, and Social History in the Epic System and with family.    Review of Systems:  Please see Subjective / HPI for pertinent positives and negatives. All other systems reviewed and found to be negative.      Objective     Patient Vitals for the past 24 hrs:   BP Temp Temp src Pulse Resp SpO2 Height Weight   11/07/22 2038 132/71 98.5  F (36.9  C) Tympanic 89 18 98 % 1.676 m (5' 6\") 79.4 kg (175 lb)       Physical Exam:   General: Awake, alert, in no acute distress.  Head: Normocephalic, atraumatic.  Eyes: Conjugate gaze.  ENT: Moist membranes, external ear appears normal.   Chest/Respiratory: Equal chest rise, clear bilaterally..  Cardiovascular: Peripheral pulses present, regular rate and rhythm.  Abdominal: Soft, non-distended, non-tender.  Extremities: No obvious deformity.  Neurological: GCS 15, moving all extremities without gross deficit.  Skin: Warm, no rashes, lesions, or bruising.   Psychiatric: Appropriate affect.     Procedures / Critical Care   Procedures    Critical Care Time: None.     No orders of the defined types were placed in this encounter.      RESULTS:  No results found for any visits on 11/07/22.          Medical/Surgical History:  Past Medical History:   Diagnosis Date     Otitis media     6/6/09,left- treated with Zithromax     Streptococcal pharyngitis     No Comments Provided     Past Surgical History:   Procedure Laterality Date     OTHER SURGICAL HISTORY      [id number],OTHER,lacerated tendon     OTHER SURGICAL HISTORY      [id number],OTHER,Times two     TONSILLECTOMY      06/2017       Medications:  Current Facility-Administered Medications   Medication     metoprolol succinate ER (TOPROL XL) 24 hr tablet 25 mg     Current Outpatient Medications   Medication     metoprolol succinate ER (TOPROL XL) 25 MG 24 hr tablet     acetaminophen (TYLENOL) 500 MG tablet     hydrOXYzine " (ATARAX) 25 MG tablet     ibuprofen (ADVIL/MOTRIN) 200 MG tablet     metroNIDAZOLE (FLAGYL) 500 MG tablet     ondansetron (ZOFRAN ODT) 4 MG ODT tab     penicillin V (VEETID) 500 MG tablet     SODIUM FLUORIDE 5000 PPM 1.1 % PSTE dental paste     vitamin D3 (CHOLECALCIFEROL) 50 mcg (2000 units) tablet       Allergies:  Patient has no known allergies.    Relevant labs, images, EKGs, Epic and outside hospital (if applicable) charts were reviewed. The findings, diagnosis, plan, and need for follow up were discussed with the patient/family. Nursing notes were reviewed.      Taj Palacio MD  11/07/22 5080

## 2022-11-08 NOTE — ED TRIAGE NOTES
"Pt arrives to triage from [facility] for difficulty breathing.  Pt is autistic.  Pt report being seen for PSVT and put on a Zio patch.  Pt states after he was done doing dishes, he could feel his HR \"going up\" and started feeling SOB.  EMS VS-BP-134/74, HR-89, O2-98% RA, temp-98.9, BG-92.  EMS EKG-unremarkable.  Pt is on antibiotics for UTI and dental infection.     Triage Assessment     Row Name 11/07/22 2032       Triage Assessment (Adult)    Airway WDL WDL       Respiratory WDL    Respiratory WDL X;rhythm/pattern    Rhythm/Pattern, Respiratory shortness of breath       Skin Circulation/Temperature WDL    Skin Circulation/Temperature WDL WDL       Cardiac WDL    Cardiac WDL WDL       Peripheral/Neurovascular WDL    Peripheral Neurovascular WDL WDL       Cognitive/Neuro/Behavioral WDL    Cognitive/Neuro/Behavioral WDL WDL;X;mood/behavior    Level of Consciousness confused    Arousal Level opens eyes spontaneously    Orientation oriented x 4    Speech clear    Mood/Behavior anxious              "

## 2022-11-11 ENCOUNTER — OFFICE VISIT (OUTPATIENT)
Dept: FAMILY MEDICINE | Facility: OTHER | Age: 22
End: 2022-11-11
Attending: NURSE PRACTITIONER
Payer: COMMERCIAL

## 2022-11-11 VITALS
TEMPERATURE: 97.6 F | WEIGHT: 175.6 LBS | OXYGEN SATURATION: 97 % | BODY MASS INDEX: 28.34 KG/M2 | HEART RATE: 67 BPM | SYSTOLIC BLOOD PRESSURE: 116 MMHG | DIASTOLIC BLOOD PRESSURE: 72 MMHG

## 2022-11-11 DIAGNOSIS — F84.0 ACTIVE AUTISTIC DISORDER: ICD-10-CM

## 2022-11-11 DIAGNOSIS — F41.9 ANXIETY: ICD-10-CM

## 2022-11-11 DIAGNOSIS — H61.23 BILATERAL IMPACTED CERUMEN: ICD-10-CM

## 2022-11-11 DIAGNOSIS — I47.10 PAROXYSMAL SUPRAVENTRICULAR TACHYCARDIA (H): Primary | ICD-10-CM

## 2022-11-11 PROCEDURE — 99213 OFFICE O/P EST LOW 20 MIN: CPT | Performed by: NURSE PRACTITIONER

## 2022-11-11 PROCEDURE — G0463 HOSPITAL OUTPT CLINIC VISIT: HCPCS

## 2022-11-11 ASSESSMENT — PAIN SCALES - GENERAL: PAINLEVEL: NO PAIN (0)

## 2022-11-11 NOTE — PROGRESS NOTES
Assessment & Plan   Problem List Items Addressed This Visit        Circulatory    Paroxysmal supraventricular tachycardia (H) - Primary       Behavioral    Active autistic disorder   Other Visit Diagnoses     Anxiety        Bilateral impacted cerumen          Continue with Zio patch, will follow-up with results when these are available.  He will also continue with metoprolol daily as this is working well for him.  He has connected well with [facility], feels his mental health symptoms have calmed down and he has good support.  No concerns today.  Plans to continue with current medications and support that is in place.  Anxiety is stable.  Bilateral ear wax, flushed by nursing and tolerated well.      36 minutes spent on the date of the encounter doing chart review, history and exam, documentation and further activities per the note           No follow-ups on file.    ABI Jin [facility] AND [facility]    Marisa Licea is a 22 year old accompanied by his mother, presenting for the following health issues:  Recheck Medication      History of Present Illness       Reason for visit:  Follow up for hallucinations i've had in the past    He eats 0-1 servings of fruits and vegetables daily.He consumes 0 sweetened beverage(s) daily.He exercises with enough effort to increase his heart rate 60 or more minutes per day.  He exercises with enough effort to increase his heart rate 3 or less days per week. He is missing 1 dose(s) of medications per week.  He is not taking prescribed medications regularly due to remembering to take.     He comes in today for follow-up.  He was recently seen in the emergency room multiple times, initially was seen for DEC assessment due to anxiety and concerns about auditory and visual hallucinations.  He has been working with [facility] and [facility], has frequent contact with [facility] staff.  He feels symptoms are under good control.  He is not having any auditory or visual hallucinations  over the past 2 weeks.  He is using hydroxyzine rarely for anxiety.    He was in the emergency room also for heart palpitations.  He was diagnosed with paroxysmal supraventricular tachycardia, he has a Holter monitor in place currently.  He is due to turn this on Sunday.  He reports he has pushed the button x2 for increased pulse and palpitations.  He is taking the metoprolol 25 mg every evening and feels this is working well.    He also was recently diagnosed with urinary tract infection.  He has an appointment for follow-up with urology.  He is no longer having any urinary symptoms.    Finally he would like to have his ears checked as he feels like they are plugged and may need these flushed.    Review of Systems   See above      Objective    /72   Pulse 67   Temp 97.6  F (36.4  C)   Wt 79.7 kg (175 lb 9.6 oz)   SpO2 97%   BMI 28.34 kg/m    Body mass index is 28.34 kg/m .  Physical Exam   GENERAL: healthy, alert and no distress  EYES: Eyes grossly normal to inspection, PERRL and conjunctivae and sclerae normal  HENT: ear canals and TM's normal after impacted wax was flushed by nursing, nose and mouth without ulcers or lesions  NECK: no adenopathy, no asymmetry, masses, or scars and thyroid normal to palpation  RESP: lungs clear to auscultation - no rales, rhonchi or wheezes  CV: regular rate and rhythm, normal S1 S2, no S3 or S4, no murmur, click or rub, no peripheral edema and peripheral pulses strong  NEURO: Normal strength and tone, mentation intact and speech normal  PSYCH: mentation appears normal, affect normal/bright

## 2022-11-11 NOTE — NURSING NOTE
Pt presents to clinic today with concerns of recent hallucinations, patients states he had hallucinations about 2 weeks ago and has only happened once. Patient states he has had some really traumatic events over the last year, patient states he also was in the ER a few times for tachycardia.     FOOD SECURITY SCREENING QUESTIONS:    The next two questions are to help us understand your food security.  If you are feeling you need any assistance in this area, we have resources available to support you today.    Hunger Vital Signs:  Within the past 12 months we worried whether our food would run out before we got money to buy more. Never  Within the past 12 months the food we bought just didn't last and we didn't have money to get more. Never  Medication Reconciliation: complete  Florencia Elliott LPN,AMARIS on 11/11/2022 at 2:11 PM

## 2022-12-05 ENCOUNTER — OFFICE VISIT (OUTPATIENT)
Dept: UROLOGY | Facility: OTHER | Age: 22
End: 2022-12-05
Attending: PHYSICIAN ASSISTANT
Payer: COMMERCIAL

## 2022-12-05 VITALS
HEART RATE: 78 BPM | BODY MASS INDEX: 28.05 KG/M2 | RESPIRATION RATE: 16 BRPM | DIASTOLIC BLOOD PRESSURE: 72 MMHG | OXYGEN SATURATION: 99 % | WEIGHT: 173.8 LBS | SYSTOLIC BLOOD PRESSURE: 116 MMHG

## 2022-12-05 DIAGNOSIS — N39.0 FREQUENT UTI: Primary | ICD-10-CM

## 2022-12-05 LAB
ALBUMIN UR-MCNC: NEGATIVE MG/DL
APPEARANCE UR: CLEAR
BILIRUB UR QL STRIP: NEGATIVE
COLOR UR AUTO: NORMAL
GLUCOSE UR STRIP-MCNC: NEGATIVE MG/DL
HGB UR QL STRIP: NEGATIVE
KETONES UR STRIP-MCNC: NEGATIVE MG/DL
LEUKOCYTE ESTERASE UR QL STRIP: NEGATIVE
NITRATE UR QL: NEGATIVE
PH UR STRIP: 6 [PH] (ref 5–9)
SP GR UR STRIP: 1.01 (ref 1–1.03)
UROBILINOGEN UR STRIP-MCNC: NORMAL MG/DL

## 2022-12-05 PROCEDURE — G0463 HOSPITAL OUTPT CLINIC VISIT: HCPCS

## 2022-12-05 PROCEDURE — 51798 US URINE CAPACITY MEASURE: CPT | Performed by: UROLOGY

## 2022-12-05 PROCEDURE — 81003 URINALYSIS AUTO W/O SCOPE: CPT | Mod: ZL | Performed by: UROLOGY

## 2022-12-05 PROCEDURE — 99202 OFFICE O/P NEW SF 15 MIN: CPT | Performed by: UROLOGY

## 2022-12-05 ASSESSMENT — PAIN SCALES - GENERAL: PAINLEVEL: NO PAIN (0)

## 2022-12-05 NOTE — PROGRESS NOTES
Type of Visit  NPV    Chief Complaint  Urinary concerns  Dark urine  UTI    HPI  Mr. Leavitt is a 22 year old male with history of recent ED visit who presents with urinary concerns.  The patient noticed significant darkening of his urine about a month ago.  This prompted him to visit the ED.  He was diagnosed with UTI however the culture was negative.  The patient was started on Keflex and this was discontinued after 2 days when the culture was negative.  The patient presents today with questions regarding urinary pattern and urinary color.  He is also asking if he still has an infection.      Past Medical History  He  has a past medical history of Otitis media and Streptococcal pharyngitis.  Patient Active Problem List   Diagnosis     Acne     Active autistic disorder     Paroxysmal supraventricular tachycardia (H)       Past Surgical History  He  has a past surgical history that includes other surgical history; other surgical history; and Tonsillectomy.    Medications  He has a current medication list which includes the following prescription(s): acetaminophen, hydroxyzine, ibuprofen, metoprolol succinate er, metronidazole, ondansetron, penicillin v, sodium fluoride 5000 ppm, and vitamin d3.    Allergies  No Known Allergies    Social History  He  reports that he has never smoked. He has never used smokeless tobacco. He reports that he does not drink alcohol and does not use drugs.  No drug abuse.    Family History  Family History   Problem Relation Age of Onset     Heart Disease Mother         Heart Disease,cardiac coronary anomaly     Thyroid nodules Mother      Other - See Comments Father         MVA     Attention Deficit Disorder Maternal Half-Sister         ADD / ADHD     Substance Abuse Maternal Half-Sister      Genetic Disorder No family hx of         Genetic,No family history of asthma, allergy or atopic dermatitis.       Review of Systems  I personally reviewed the ROS with the patient.    Nursing  Notes:   Tory Harvey LPN  12/5/2022  3:16 PM  Addendum  Chief Complaint   Patient presents with     Consult     UTI symptoms   Patient presents to the clinic today for a consult for a UTI    Review of Systems:    Weight loss:    Yes     Recent fever/chills:  No   Night sweats:   No  Current skin rash:  No   Recent hair loss:  No  Heat intolerance:  No   Cold intolerance:  No  Chest pain:   No   Palpitations:   No  Shortness of breath:  No   Wheezing:   No  Constipation:    No   Diarrhea:   No   Nausea:   No   Vomiting:   No   Kidney/side pain:  No   Back pain:   No  Frequent headaches:  No   Dizziness:     No  Leg swelling:   No   Calf pain:    No    Post-Void Residual  A post-void residual was measured by ultrasonic bladder scanner.  0 mL  Tory Harvey LPN  12/5/2022 3:16 PM      Medication Reconciliation: completed   Tory Harvey LPN  12/5/2022 3:04 PM       Physical Exam  Vitals:    12/05/22 1510   BP: 116/72   BP Location: Right arm   Patient Position: Sitting   Cuff Size: Adult Regular   Pulse: 78   Resp: 16   SpO2: 99%   Weight: 78.8 kg (173 lb 12.8 oz)     Constitutional: NAD, WDWN.   Head: NCAT  Eyes: Conjunctivae normal  Pulmonary/Chest: Respirations are even and non-labored bilaterally.  Abdominal: Soft. No distension. No CVA tenderness.  Extremities: KATHERINE x 4, Warm. No clubbing.  No cyanosis.    Skin: Pink, warm and dry.  No rashes noted.  Genitourinary:  Nonpalpable bladder    Labs  Results for orders placed or performed in visit on 12/05/22   UA reflex to Microscopic     Status: Normal   Result Value Ref Range    Color Urine Light Yellow Colorless, Straw, Light Yellow, Yellow    Appearance Urine Clear Clear    Glucose Urine Negative Negative mg/dL    Bilirubin Urine Negative Negative    Ketones Urine Negative Negative mg/dL    Specific Gravity Urine 1.014 1.000 - 1.030    Blood Urine Negative Negative    pH Urine 6.0 5.0 - 9.0    Protein Albumin Urine Negative Negative mg/dL    Urobilinogen Urine  Normal Normal, 2.0 mg/dL    Nitrite Urine Negative Negative    Leukocyte Esterase Urine Negative Negative    Narrative    Microscopic not indicated      11/05/22 13:25   Color Urine Yellow   Appearance Urine Slightly Cloudy !   Glucose Urine Negative   Bilirubin Urine Negative   Ketones Urine 80 !   Specific Gravity Urine 1.029   pH Urine 6.0   Protein Albumin Urine 30 !   Urobilinogen mg/dL Normal   Nitrite Urine Positive !   Blood Urine Negative   Leukocyte Esterase Urine Small !   WBC Urine 4   RBC Urine 2   Mucus Urine Present !     UCx  11/5/2022  No Growth    Assessment & Plan  Mr. Leavitt is a 22 year old male who presents with a normal urinalysis today.  Reviewed the past notes and labs to conduct this visit.  I explained that he did not have an infection at the time that he visited the ED 1 month ago.  I also explained he did not have an infection today.  I recommended increased hydration.  His urine last month was quite concentrated based on the specific gravity.  This is the explanation for his dark urine.  Explained that increased hydration leads to clear urine.  Decreased hydration leads to dark urine.  Patient can follow-up as needed

## 2022-12-08 ENCOUNTER — TELEPHONE (OUTPATIENT)
Dept: NURSING | Facility: CLINIC | Age: 22
End: 2022-12-08

## 2022-12-08 NOTE — TELEPHONE ENCOUNTER
Patient would [facility]    Patient would also like to know if he can continue to do aerobic exercise.    Please address.    Inna Riley RN on 12/8/2022 at 5:25 PM

## 2022-12-09 NOTE — TELEPHONE ENCOUNTER
The EKG and leadless EKG are already available in [facility]-appears he has seen these. He is fine to do any exercise he desires including cardio. ABI Jin CNP on 12/9/2022 at 7:35 AM

## 2022-12-13 ENCOUNTER — OFFICE VISIT (OUTPATIENT)
Dept: FAMILY MEDICINE | Facility: OTHER | Age: 22
End: 2022-12-13
Attending: NURSE PRACTITIONER
Payer: COMMERCIAL

## 2022-12-13 VITALS
OXYGEN SATURATION: 98 % | WEIGHT: 173.2 LBS | HEART RATE: 80 BPM | DIASTOLIC BLOOD PRESSURE: 72 MMHG | SYSTOLIC BLOOD PRESSURE: 114 MMHG | TEMPERATURE: 98.5 F | BODY MASS INDEX: 27.83 KG/M2 | HEIGHT: 66 IN | RESPIRATION RATE: 16 BRPM

## 2022-12-13 DIAGNOSIS — K62.5 BRIGHT RED RECTAL BLEEDING: ICD-10-CM

## 2022-12-13 DIAGNOSIS — K59.01 SLOW TRANSIT CONSTIPATION: ICD-10-CM

## 2022-12-13 DIAGNOSIS — R07.89 CHEST TIGHTNESS: Primary | ICD-10-CM

## 2022-12-13 DIAGNOSIS — R06.02 SHORTNESS OF BREATH: ICD-10-CM

## 2022-12-13 PROCEDURE — G0463 HOSPITAL OUTPT CLINIC VISIT: HCPCS

## 2022-12-13 PROCEDURE — 99214 OFFICE O/P EST MOD 30 MIN: CPT | Performed by: NURSE PRACTITIONER

## 2022-12-13 RX ORDER — POLYETHYLENE GLYCOL 3350 17 G/17G
1 POWDER, FOR SOLUTION ORAL DAILY
Qty: 507 G | Refills: 1 | Status: SHIPPED | OUTPATIENT
Start: 2022-12-13

## 2022-12-13 NOTE — NURSING NOTE
Patient presents today for follow up on EKG. Patient reports that he is still having chest pain.    Medication Reconciliation Complete    Rosamaria Vidal LPN  12/13/2022 10:21 AM

## 2022-12-13 NOTE — TELEPHONE ENCOUNTER
Patient had followup appointment today with provider and was also notified of results. No further concerns    Corinne R Thayer, RN on 12/13/2022 at 4:36 PM

## 2022-12-13 NOTE — PROGRESS NOTES
Assessment & Plan   Problem List Items Addressed This Visit    None  Visit Diagnoses     Chest tightness    -  Primary    Relevant Orders    Pulmonary Function Test ()    Shortness of breath        Relevant Orders    Pulmonary Function Test ()    Slow transit constipation        Relevant Medications    polyethylene glycol (MIRALAX) 17 GM/Dose powder    Bright red rectal bleeding          We did review his recent EKG and Zio patch that are all within normal limits.  Discussed anxiety but he feels this has improved.  Given his chest tightness and shortness of breath, we will do a pulmonary function testing to ensure respiratory status is stable.  Labs have been done recently which have all been stable as well.  If PFTs are within normal limits, I suspect this is more anxiety related and we will address this further.    Bright red rectal bleeding likely related to large stools and constipation.  Treated with MiraLAX.  Follow-up as needed    Ordering of each unique test  Prescription drug management  32 minutes spent on the date of the encounter doing chart review, history and exam, documentation and further activities per the note           No follow-ups on file.    ABI Jin CNP  [facility] AND [facility] is a 22 year old, presenting for the following health issues:  RECHECK      History of Present Illness       Reason for visit:  EKG review and questions    He eats 0-1 servings of fruits and vegetables daily.He consumes 0 sweetened beverage(s) daily.He exercises with enough effort to increase his heart rate 30 to 60 minutes per day.  He exercises with enough effort to increase his heart rate 3 or less days per week.   He is taking medications regularly.     He comes in today for follow-up.  He had Zio patch completed for about 3 days.  This was a normal test.  He has been taking metoprolol at night to help with palpitations and anxiety.  He reports this has been  "working quite well but quickly will have a tightness in his chest and feels like he is breathing hard even at rest.  He has not been exercising as much is normal.  Feels his anxiety has improved.  He is not currently going to school or working.  He is worried about his breathing and wants to review his recent cardiac testing.    He also notes some intermittent rectal bleeding.  Has bowel movements 1-2 times daily, bowel movements are hard and difficult to pass at times.  He has never taken anything for stool softeners or bowel management.    Review of Systems   See above      Objective    /72   Pulse 80   Temp 98.5  F (36.9  C)   Resp 16   Ht 1.676 m (5' 6\")   Wt 78.6 kg (173 lb 3.2 oz)   SpO2 98%   BMI 27.96 kg/m    Body mass index is 27.96 kg/m .  Physical Exam   GENERAL: healthy, alert and no distress  EYES: Eyes grossly normal to inspection  RESP: lungs clear to auscultation - no rales, rhonchi or wheezes  CV: regular rate and rhythm, normal S1 S2  NEURO: Normal strength and tone, mentation intact and speech normal  PSYCH: mentation appears normal, affect normal/bright        "

## 2022-12-20 ENCOUNTER — OFFICE VISIT (OUTPATIENT)
Dept: FAMILY MEDICINE | Facility: OTHER | Age: 22
End: 2022-12-20
Attending: NURSE PRACTITIONER
Payer: COMMERCIAL

## 2022-12-20 VITALS — WEIGHT: 173 LBS | BODY MASS INDEX: 27.8 KG/M2 | HEIGHT: 66 IN

## 2022-12-20 PROCEDURE — 97802 MEDICAL NUTRITION INDIV IN: CPT | Performed by: DIETITIAN, REGISTERED

## 2022-12-20 NOTE — PROGRESS NOTES
History of Present Illness       Reason for visit:  EKG review and questions    He eats 0-1 servings of fruits and vegetables daily.He consumes 0 sweetened beverage(s) daily.He exercises with enough effort to increase his heart rate 30 to 60 minutes per day.  He exercises with enough effort to increase his heart rate 3 or less days per week.   He is taking medications regularly.        Patient is here today for MNT related to his BMI of 28.  PCP: Raquel Licea reports he has very low energy and wants to get back to a healthier weight with more muscle. He currently lives with his mom and grandma. Is not working and has some financial concerns, but he said food security is not an issue.    He eats 2 meals per day    Noon-wakes up and has eggs, toast  Evening Meal-variety. His mom or grandma cooks    Exercise: he has a [employer] membership and wants to get into MMA fighting.    Low Vit D noted. He is taking 5000 international unit(s) per day.    Discussed goal of 2200 calories and all of the food groups,   Increase protein with each meal and increase food frequency (meals).    Grain-9  Fruit-3  Veggies 3-4  Protein: 85 grams  Fat: 5-8 healthy fat servings  Milk: 3 cups    Fluid: 90 oz.    Gave him sample meals, meal plans and recipes. Gave snack idea list.    Dx: BMI 28, overweight    GOALS: BMI healthy range for active young adult  Exercise: 200 min per week    Time spent: 45 min    KRISTIN K. KLINEFELTER, RD on 12/20/2022 at 10:42 AM

## 2022-12-26 ENCOUNTER — HOSPITAL ENCOUNTER (OUTPATIENT)
Dept: RESPIRATORY THERAPY | Facility: OTHER | Age: 22
Discharge: HOME | End: 2022-12-26
Attending: NURSE PRACTITIONER | Admitting: NURSE PRACTITIONER
Payer: COMMERCIAL

## 2022-12-26 DIAGNOSIS — R07.89 CHEST TIGHTNESS: ICD-10-CM

## 2022-12-26 DIAGNOSIS — R06.02 SHORTNESS OF BREATH: ICD-10-CM

## 2022-12-26 PROCEDURE — 94060 EVALUATION OF WHEEZING: CPT | Mod: 26 | Performed by: INTERNAL MEDICINE

## 2022-12-26 PROCEDURE — 999N000157 HC STATISTIC RCP TIME EA 10 MIN

## 2022-12-26 PROCEDURE — 94729 DIFFUSING CAPACITY: CPT

## 2022-12-26 PROCEDURE — 94726 PLETHYSMOGRAPHY LUNG VOLUMES: CPT | Mod: 26 | Performed by: INTERNAL MEDICINE

## 2022-12-26 PROCEDURE — 250N000009 HC RX 250

## 2022-12-26 PROCEDURE — 94726 PLETHYSMOGRAPHY LUNG VOLUMES: CPT

## 2022-12-26 PROCEDURE — 94640 AIRWAY INHALATION TREATMENT: CPT

## 2022-12-26 PROCEDURE — 94060 EVALUATION OF WHEEZING: CPT | Mod: 26

## 2022-12-26 PROCEDURE — 94729 DIFFUSING CAPACITY: CPT | Mod: 26 | Performed by: INTERNAL MEDICINE

## 2022-12-26 RX ORDER — ALBUTEROL SULFATE 0.83 MG/ML
2.5 SOLUTION RESPIRATORY (INHALATION)
Status: COMPLETED | OUTPATIENT
Start: 2022-12-26 | End: 2022-12-26

## 2022-12-26 RX ADMIN — ALBUTEROL SULFATE 2.5 MG: 2.5 SOLUTION RESPIRATORY (INHALATION) at 12:52

## 2023-01-05 DIAGNOSIS — I47.10 PAROXYSMAL SUPRAVENTRICULAR TACHYCARDIA (H): ICD-10-CM

## 2023-01-09 DIAGNOSIS — I47.10 PAROXYSMAL SUPRAVENTRICULAR TACHYCARDIA (H): ICD-10-CM

## 2023-01-09 RX ORDER — METOPROLOL SUCCINATE 25 MG/1
25 TABLET, EXTENDED RELEASE ORAL EVERY EVENING
Qty: 90 TABLET | Refills: 3 | Status: SHIPPED | OUTPATIENT
Start: 2023-01-09 | End: 2024-03-19

## 2023-01-09 NOTE — TELEPHONE ENCOUNTER
Patient requested DMO or DWS nurse call to let patient know when the refill has been sent.    Requested Prescriptions   Pending Prescriptions Disp Refills     metoprolol succinate ER (TOPROL XL) 25 MG 24 hr tablet 30 tablet 1     Sig: Take 1 tablet (25 mg) by mouth every evening   Last Prescription Date:   11/7/22  Last Fill Qty/Refills:         30, R-1  (Dr. Palacio, ED)  Last Office Visit:              12/13/22   Future Office visit:           None    Per LOV note:  He has been taking metoprolol at night to help with palpitations and anxiety.  He reports this has been working quite well but quickly will have a tightness in his chest and feels like he is breathing hard even at rest.  He has not been exercising as much is normal.  Feels his anxiety has improved.  He is not currently going to school or working.  He is worried about his breathing and wants to review his recent cardiac testing.    In clinical absence of patient's primary, Raquel Elliott, patient is requesting that this message be sent to the covering provider for consideration please.    Myrna Love RN .............. 1/9/2023  11:22 AM

## 2023-01-09 NOTE — TELEPHONE ENCOUNTER
Reason for call: Medication or medication refill    Name of medication requested: metoprolol succinate 25 mg    Are you out of the medication? Yes    What pharmacy do you use? [facility]    Preferred method for responding to this message:Telephone    Phone number patient can be reached at: Cell number on file:    Telephone Information:   Mobile [phone]       If we cannot reach you directly, may we leave a detailed response at the number you provided? Yes    Patient requested DMO or DWS nurse call to let patient know when the refill has been sent.    Margot Tinsley on 1/9/2023 at 11:12 AM

## 2023-01-10 RX ORDER — METOPROLOL SUCCINATE 25 MG/1
25 TABLET, EXTENDED RELEASE ORAL EVERY EVENING
Qty: 30 TABLET | Refills: 1 | OUTPATIENT
Start: 2023-01-10

## 2023-01-20 ENCOUNTER — OFFICE VISIT (OUTPATIENT)
Dept: FAMILY MEDICINE | Facility: OTHER | Age: 23
End: 2023-01-20
Attending: NURSE PRACTITIONER
Payer: COMMERCIAL

## 2023-01-20 VITALS
DIASTOLIC BLOOD PRESSURE: 74 MMHG | HEART RATE: 60 BPM | OXYGEN SATURATION: 98 % | WEIGHT: 173.6 LBS | BODY MASS INDEX: 27.9 KG/M2 | RESPIRATION RATE: 16 BRPM | SYSTOLIC BLOOD PRESSURE: 110 MMHG | HEIGHT: 66 IN

## 2023-01-20 DIAGNOSIS — I47.10 PAROXYSMAL SUPRAVENTRICULAR TACHYCARDIA (H): ICD-10-CM

## 2023-01-20 DIAGNOSIS — E55.9 VITAMIN D DEFICIENCY: Primary | ICD-10-CM

## 2023-01-20 DIAGNOSIS — K59.01 SLOW TRANSIT CONSTIPATION: ICD-10-CM

## 2023-01-20 PROCEDURE — 82306 VITAMIN D 25 HYDROXY: CPT | Mod: ZL | Performed by: NURSE PRACTITIONER

## 2023-01-20 PROCEDURE — 99213 OFFICE O/P EST LOW 20 MIN: CPT | Performed by: NURSE PRACTITIONER

## 2023-01-20 PROCEDURE — G0463 HOSPITAL OUTPT CLINIC VISIT: HCPCS

## 2023-01-20 PROCEDURE — 36415 COLL VENOUS BLD VENIPUNCTURE: CPT | Mod: ZL | Performed by: NURSE PRACTITIONER

## 2023-01-20 ASSESSMENT — PAIN SCALES - GENERAL: PAINLEVEL: NO PAIN (0)

## 2023-01-20 NOTE — NURSING NOTE
Patient presents today for follow up on PFT results.    Medication Reconciliation Complete    Rosamaria Vidal LPN  1/20/2023 1:19 PM

## 2023-01-20 NOTE — PROGRESS NOTES
Assessment & Plan   Problem List Items Addressed This Visit        Circulatory    Paroxysmal supraventricular tachycardia (H)   Other Visit Diagnoses     Vitamin D deficiency    -  Primary    Relevant Orders    Vitamin D Total    Slow transit constipation          We reviewed his previous Zio patch and pulmonary function test results today.  Reassurance was provided.  Recommend he continue on metoprolol as he is tolerating this well and is helping with his symptoms.  Vitamin D level obtained and pending.  Discussed ongoing use of MiraLAX, daily or every other day to keep his stools soft and prevent the bright red rectal bleeding.  If he has ongoing symptoms, follow-up in clinic.    Review of the result(s) of each unique test - PFT, zio patch  Prescription drug management  22minutes spent on the date of the encounter doing chart review, history and exam, documentation and further activities per the note           No follow-ups on file.    ABI Jin [facility] AND [facility]    Marisa Licea is a 22 year old, presenting for the following health issues:  RECHECK      History of Present Illness       Reason for visit:  Follow up for test results    He eats 2-3 servings of fruits and vegetables daily.He consumes 0 sweetened beverage(s) daily.He exercises with enough effort to increase his heart rate 30 to 60 minutes per day.  He exercises with enough effort to increase his heart rate 3 or less days per week.   He is taking medications regularly.     He comes in today to go over his previous pulmonary function testing and heart testing.  He has been tolerating the metoprolol well, feels this is controlling his symptoms nicely.  He did have pulmonary function testing which showed no concerns for obstructive lung disease.  He wants to make sure he can continue taking the metoprolol daily.    He was using MiraLAX daily due to constipation and some episodes of a small amount of bright red blood.   "He reports he stopped using the MiraLAX, stools got harder and he had bright red blood in the stool again.  He is not having any abdominal pain, no thick, tarry stools, no reflux symptoms.  He has restarted the MiraLAX, stools are getting softer and has not had any further blood in his stool.    Currently taking vitamin D3 5000 units daily.  He had this last checked in October 2022.  He would like to have this rechecked again today.    Review of Systems   See above      Objective    /74   Pulse 60   Resp 16   Ht 1.676 m (5' 6\")   Wt 78.7 kg (173 lb 9.6 oz)   SpO2 98%   BMI 28.02 kg/m    Body mass index is 28.02 kg/m .  Physical Exam   GENERAL: healthy, alert and no distress  EYES: Eyes grossly normal to inspection  NECK: no adenopathy, no asymmetry, masses, or scars and thyroid normal to palpation  RESP: lungs clear to auscultation - no rales, rhonchi or wheezes  CV: regular rate and rhythm, normal S1 S2  ABDOMEN: soft, nontender  NEURO: Normal strength and tone, mentation intact and speech normal  PSYCH: mentation appears normal, affect normal/bright        "

## 2023-01-21 LAB — DEPRECATED CALCIDIOL+CALCIFEROL SERPL-MC: 51 UG/L (ref 20–75)

## 2023-04-27 ENCOUNTER — OFFICE VISIT (OUTPATIENT)
Dept: FAMILY MEDICINE | Facility: OTHER | Age: 23
End: 2023-04-27
Attending: PHYSICIAN ASSISTANT
Payer: COMMERCIAL

## 2023-04-27 VITALS
TEMPERATURE: 97.7 F | WEIGHT: 174 LBS | DIASTOLIC BLOOD PRESSURE: 86 MMHG | RESPIRATION RATE: 16 BRPM | HEART RATE: 109 BPM | SYSTOLIC BLOOD PRESSURE: 132 MMHG | BODY MASS INDEX: 28.08 KG/M2 | OXYGEN SATURATION: 97 %

## 2023-04-27 DIAGNOSIS — K04.7 DENTAL INFECTION: Primary | ICD-10-CM

## 2023-04-27 PROCEDURE — G0463 HOSPITAL OUTPT CLINIC VISIT: HCPCS

## 2023-04-27 PROCEDURE — 99213 OFFICE O/P EST LOW 20 MIN: CPT | Performed by: PHYSICIAN ASSISTANT

## 2023-04-27 RX ORDER — CHLORHEXIDINE GLUCONATE ORAL RINSE 1.2 MG/ML
15 SOLUTION DENTAL 2 TIMES DAILY
Qty: 473 ML | Refills: 0 | Status: SHIPPED | OUTPATIENT
Start: 2023-04-27

## 2023-04-27 ASSESSMENT — PAIN SCALES - GENERAL: PAINLEVEL: MODERATE PAIN (4)

## 2023-04-27 NOTE — PROGRESS NOTES
"  Assessment & Plan     1. Dental infection  - amoxicillin-clavulanate (AUGMENTIN) 875-125 MG tablet; Take 1 tablet by mouth 2 times daily for 10 days  Dispense: 20 tablet; Refill: 0  - chlorhexidine (PERIDEX) 0.12 % solution; Swish and spit 15 mLs in mouth 2 times daily  Dispense: 473 mL; Refill: 0  - Vital signs stable.  Findings are consistent with dental infection.  Discussed options for treatment management, we will treat patient with oral antibiotic Augmentin PO BID x 10 days.  Discussed Magic mouthwash and/or Chlorhexidine mouthwash with patient -opted for chlorhexidine. Discussed risks, benefits and side effects of medication at length with patient.  Also discussed use of probiotic/increasing yogurt intake while on antibiotic as can lead to GI upset, both these remedies are available over-the-counter.  Discussed prompt follow-up with dentist.  If fevers, chills, worsening signs or symptoms of infection occur patient is agreeable to return for reevaluation. Patient is in agreement and understanding of the above treatment plan. All questions and concerns were addressed and answered to patient's satisfaction. AVS reviewed with patient.      BMI:   Estimated body mass index is 28.08 kg/m  as calculated from the following:    Height as of 1/20/23: 1.676 m (5' 6\").    Weight as of this encounter: 78.9 kg (174 lb).   Weight management plan: Discussed healthy diet and exercise guidelines    See Patient Instructions    No follow-ups on file.    Yuly Ordonez PA-C  [facility] AND [facility] is a 22 year old, presenting for the following health issues:  Dental Pain (For a week)         View : No data to display.              Dental Pain         Patient presenting with intermittent dental pain since October which worsened over the last few days.    Symptoms:  Location: Upper and lower posterior right molars  Extraoral: pain on palpation  Intraoral: swellings  Jaw pain: YES  Any masses or " occlusion: No  Dysphagia: No  Facial swelling: No  Discharge/pus: No  Sinus pain or pressure: No  Fevers, chills, signs of infection: No  Recent infections: No  Recent antibiotic treatment/course: No    Last dental visit: a while  Any prior dental problems: YES    Tx Tried: OTC meds    Review of Systems   Constitutional, HEENT, cardiovascular, pulmonary, GI, , musculoskeletal, neuro, skin, endocrine and psych systems are negative, except as otherwise noted.        Objective    /86   Pulse 109   Temp 97.7  F (36.5  C) (Temporal)   Resp 16   Wt 78.9 kg (174 lb)   SpO2 97%   BMI 28.08 kg/m    Body mass index is 28.08 kg/m .  Physical Exam   GENERAL: healthy, alert and no distress  EYES: Eyes grossly normal to inspection, PERRL and conjunctivae and sclerae normal  HENT: normal cephalic/atraumatic, ear canals and TM's normal, nose and mouth without ulcers or lesions, oropharynx clear, oral mucous membranes moist and gingival edema and irritation to posterior upper right and lower right second molars. There is no evidence of abscess. No difficulty breathing/talking or handling own secretions.   NECK: no adenopathy, no asymmetry, masses, or scars and thyroid normal to palpation  RESP: lungs clear to auscultation - no rales, rhonchi or wheezes  CV: regular rate and rhythm, normal S1 S2, no S3 or S4, no murmur, click or rub, no peripheral edema and peripheral pulses strong  SKIN: no suspicious lesions or rashes  NEURO: Normal strength and tone, mentation intact and speech normal  PSYCH: mentation appears normal, affect normal/bright

## 2023-04-27 NOTE — PATIENT INSTRUCTIONS
You were prescribed an antibiotic, please take into consideration the following information:  - Take entire course of antibiotic even if you start to feel better.  - Antibiotics can cause stomach upset including nausea and diarrhea. Read your bottle or ask the pharmacist if antibiotic can be taken with food to help prevent nausea. If you have symptoms of diarrhea you can take an over-the-counter probiotic and/or increase foods with probiotics such as yogurt, Ranjan, sauerkraut.  -Use caution in sunlight as can lead to increased risk of sunburn while on ABX (antibiotics).     Please refer to your AVS for follow up and pain/symptoms management recommendations (I.e.: medications, helpful conservative treatment modalities, appropriate follow up if need to a specialist or family practice, etc.). Please return to urgent care if your symptoms change or worsen.     Discharge instructions:  -If you were prescribed a medication(s), please take this as prescribed/directed  -Monitor your symptoms, if changing/worsening, return to UC/ER or PCP for follow up    You were seen today in regards to dental pain.   -Recommendations for dental pain include: follow up with your dentist within 3-5 days.   -Keep hydrated with clear fluids (water best - try to avoid coffee and sugar filled drink).   -Monitor your symptoms for fevers, chills, signs of infection, shortness of breath, difficulty breathing/speaking, increased pain/worsening symptoms - if these occur call your PCP or dentist or return to the ER/UC.   -Salt water gargles recommended as well.   -For pain control (if needed), if you are able to take Ibuprofen and Tylenol, we recommend alternating these (see note below). Do not wear a patch over your eye (unless directed to do so).    -Alternate every 4 hours as needed. I.e.: Ibuprofen at 8am, Tylenol 12pm, Ibuprofen 4pm    -Daily maximum of Tylenol is 4000mg (recommend staying under 3000mg)    -Daily maximum of Ibuprofen is 3200mg

## 2023-04-27 NOTE — NURSING NOTE
"Chief Complaint   Patient presents with     Dental Pain     For a week   He has had tooth pain for a week. He also has had a headache in the back of his head for awhile.  Juilana Rose LPN..................4/27/2023   1:05 PM      Initial /86   Pulse 109   Temp 97.7  F (36.5  C) (Temporal)   Resp 16   Wt 78.9 kg (174 lb)   SpO2 97%   BMI 28.08 kg/m   Estimated body mass index is 28.08 kg/m  as calculated from the following:    Height as of 1/20/23: 1.676 m (5' 6\").    Weight as of this encounter: 78.9 kg (174 lb).  Medication Reconciliation: complete    FOOD SECURITY SCREENING QUESTIONS  Hunger Vital Signs:  Within the past 12 months we worried whether our food would run out before we got money to buy more. Never  Within the past 12 months the food we bought just didn't last and we didn't have money to get more. Never        Advance care directive on file? no  Advance care directive provided to patient? declined     Juliana Rose, AMARIS  "

## 2023-04-28 ENCOUNTER — HOSPITAL ENCOUNTER (EMERGENCY)
Facility: OTHER | Age: 23
Discharge: HOME | End: 2023-04-28
Attending: INTERNAL MEDICINE | Admitting: INTERNAL MEDICINE
Payer: COMMERCIAL

## 2023-04-28 ENCOUNTER — APPOINTMENT (OUTPATIENT)
Dept: CT IMAGING | Facility: OTHER | Age: 23
End: 2023-04-28
Attending: STUDENT IN AN ORGANIZED HEALTH CARE EDUCATION/TRAINING PROGRAM
Payer: COMMERCIAL

## 2023-04-28 VITALS
SYSTOLIC BLOOD PRESSURE: 122 MMHG | HEART RATE: 82 BPM | BODY MASS INDEX: 27.31 KG/M2 | TEMPERATURE: 97.1 F | DIASTOLIC BLOOD PRESSURE: 78 MMHG | WEIGHT: 174 LBS | HEIGHT: 67 IN | RESPIRATION RATE: 16 BRPM | OXYGEN SATURATION: 96 %

## 2023-04-28 DIAGNOSIS — G43.001 MIGRAINE WITHOUT AURA AND WITH STATUS MIGRAINOSUS, NOT INTRACTABLE: ICD-10-CM

## 2023-04-28 LAB
ALBUMIN SERPL BCG-MCNC: 4.4 G/DL (ref 3.5–5.2)
ALP SERPL-CCNC: 62 U/L (ref 40–129)
ALT SERPL W P-5'-P-CCNC: 30 U/L (ref 10–50)
ANION GAP SERPL CALCULATED.3IONS-SCNC: 9 MMOL/L (ref 7–15)
AST SERPL W P-5'-P-CCNC: 25 U/L (ref 10–50)
BASOPHILS # BLD AUTO: 0.1 10E3/UL (ref 0–0.2)
BASOPHILS NFR BLD AUTO: 1 %
BILIRUB SERPL-MCNC: 0.6 MG/DL
BUN SERPL-MCNC: 17.7 MG/DL (ref 6–20)
CALCIUM SERPL-MCNC: 9.7 MG/DL (ref 8.6–10)
CHLORIDE SERPL-SCNC: 102 MMOL/L (ref 98–107)
CREAT SERPL-MCNC: 1.02 MG/DL (ref 0.67–1.17)
DEPRECATED HCO3 PLAS-SCNC: 29 MMOL/L (ref 22–29)
EOSINOPHIL # BLD AUTO: 0.2 10E3/UL (ref 0–0.7)
EOSINOPHIL NFR BLD AUTO: 2 %
ERYTHROCYTE [DISTWIDTH] IN BLOOD BY AUTOMATED COUNT: 12.2 % (ref 10–15)
GFR SERPL CREATININE-BSD FRML MDRD: >90 ML/MIN/1.73M2
GLUCOSE SERPL-MCNC: 123 MG/DL (ref 70–99)
HCT VFR BLD AUTO: 44.7 % (ref 40–53)
HGB BLD-MCNC: 14.8 G/DL (ref 13.3–17.7)
IMM GRANULOCYTES # BLD: 0 10E3/UL
IMM GRANULOCYTES NFR BLD: 0 %
LYMPHOCYTES # BLD AUTO: 1.7 10E3/UL (ref 0.8–5.3)
LYMPHOCYTES NFR BLD AUTO: 24 %
MCH RBC QN AUTO: 27 PG (ref 26.5–33)
MCHC RBC AUTO-ENTMCNC: 33.1 G/DL (ref 31.5–36.5)
MCV RBC AUTO: 82 FL (ref 78–100)
MONOCYTES # BLD AUTO: 0.5 10E3/UL (ref 0–1.3)
MONOCYTES NFR BLD AUTO: 7 %
NEUTROPHILS # BLD AUTO: 4.6 10E3/UL (ref 1.6–8.3)
NEUTROPHILS NFR BLD AUTO: 66 %
NRBC # BLD AUTO: 0 10E3/UL
NRBC BLD AUTO-RTO: 0 /100
PLATELET # BLD AUTO: 259 10E3/UL (ref 150–450)
POTASSIUM SERPL-SCNC: 4.2 MMOL/L (ref 3.4–5.3)
PROT SERPL-MCNC: 7.1 G/DL (ref 6.4–8.3)
RBC # BLD AUTO: 5.48 10E6/UL (ref 4.4–5.9)
SODIUM SERPL-SCNC: 140 MMOL/L (ref 136–145)
WBC # BLD AUTO: 7 10E3/UL (ref 4–11)

## 2023-04-28 PROCEDURE — 85025 COMPLETE CBC W/AUTO DIFF WBC: CPT | Performed by: STUDENT IN AN ORGANIZED HEALTH CARE EDUCATION/TRAINING PROGRAM

## 2023-04-28 PROCEDURE — 96374 THER/PROPH/DIAG INJ IV PUSH: CPT | Mod: XU

## 2023-04-28 PROCEDURE — 36415 COLL VENOUS BLD VENIPUNCTURE: CPT | Performed by: STUDENT IN AN ORGANIZED HEALTH CARE EDUCATION/TRAINING PROGRAM

## 2023-04-28 PROCEDURE — 250N000011 HC RX IP 250 OP 636: Performed by: STUDENT IN AN ORGANIZED HEALTH CARE EDUCATION/TRAINING PROGRAM

## 2023-04-28 PROCEDURE — 80053 COMPREHEN METABOLIC PANEL: CPT | Performed by: STUDENT IN AN ORGANIZED HEALTH CARE EDUCATION/TRAINING PROGRAM

## 2023-04-28 PROCEDURE — 70496 CT ANGIOGRAPHY HEAD: CPT

## 2023-04-28 PROCEDURE — 70498 CT ANGIOGRAPHY NECK: CPT

## 2023-04-28 PROCEDURE — 99284 EMERGENCY DEPT VISIT MOD MDM: CPT | Performed by: INTERNAL MEDICINE

## 2023-04-28 PROCEDURE — 250N000011 HC RX IP 250 OP 636: Performed by: INTERNAL MEDICINE

## 2023-04-28 PROCEDURE — 258N000003 HC RX IP 258 OP 636: Performed by: STUDENT IN AN ORGANIZED HEALTH CARE EDUCATION/TRAINING PROGRAM

## 2023-04-28 PROCEDURE — 96375 TX/PRO/DX INJ NEW DRUG ADDON: CPT | Mod: XU

## 2023-04-28 PROCEDURE — 99285 EMERGENCY DEPT VISIT HI MDM: CPT | Mod: 25

## 2023-04-28 PROCEDURE — 96361 HYDRATE IV INFUSION ADD-ON: CPT

## 2023-04-28 RX ORDER — IOPAMIDOL 755 MG/ML
75 INJECTION, SOLUTION INTRAVASCULAR ONCE
Status: DISCONTINUED | OUTPATIENT
Start: 2023-04-28 | End: 2023-04-28 | Stop reason: HOSPADM

## 2023-04-28 RX ORDER — IOPAMIDOL 755 MG/ML
75 INJECTION, SOLUTION INTRAVASCULAR ONCE
Status: COMPLETED | OUTPATIENT
Start: 2023-04-28 | End: 2023-04-28

## 2023-04-28 RX ORDER — DIPHENHYDRAMINE HYDROCHLORIDE 50 MG/ML
25 INJECTION INTRAMUSCULAR; INTRAVENOUS ONCE
Status: COMPLETED | OUTPATIENT
Start: 2023-04-28 | End: 2023-04-28

## 2023-04-28 RX ORDER — KETOROLAC TROMETHAMINE 30 MG/ML
30 INJECTION, SOLUTION INTRAMUSCULAR; INTRAVENOUS ONCE
Status: COMPLETED | OUTPATIENT
Start: 2023-04-28 | End: 2023-04-28

## 2023-04-28 RX ADMIN — IOPAMIDOL 75 ML: 755 INJECTION, SOLUTION INTRAVENOUS at 10:52

## 2023-04-28 RX ADMIN — SODIUM CHLORIDE 1000 ML: 9 INJECTION, SOLUTION INTRAVENOUS at 10:25

## 2023-04-28 RX ADMIN — DIPHENHYDRAMINE HYDROCHLORIDE 25 MG: 50 INJECTION, SOLUTION INTRAMUSCULAR; INTRAVENOUS at 10:22

## 2023-04-28 RX ADMIN — PROCHLORPERAZINE EDISYLATE 10 MG: 5 INJECTION INTRAMUSCULAR; INTRAVENOUS at 10:22

## 2023-04-28 RX ADMIN — KETOROLAC TROMETHAMINE 30 MG: 30 INJECTION, SOLUTION INTRAMUSCULAR; INTRAVENOUS at 10:22

## 2023-04-28 ASSESSMENT — ENCOUNTER SYMPTOMS
SHORTNESS OF BREATH: 0
ARTHRALGIAS: 0
VOMITING: 0
SEIZURES: 0
SINUS PAIN: 0
BACK PAIN: 0
FEVER: 0
WEAKNESS: 0
LIGHT-HEADEDNESS: 0
NAUSEA: 0
STRIDOR: 0
CHILLS: 0
TREMORS: 0
DIARRHEA: 0
APPETITE CHANGE: 0
ACTIVITY CHANGE: 0
DIAPHORESIS: 0
DYSURIA: 0
DYSPHORIC MOOD: 0
HALLUCINATIONS: 0
HEADACHES: 1
DIZZINESS: 0
SINUS PRESSURE: 0
NUMBNESS: 0
WHEEZING: 0
CHOKING: 0
JOINT SWELLING: 0
FATIGUE: 0
CONSTIPATION: 0
HEMATURIA: 0
DIFFICULTY URINATING: 0
BLOOD IN STOOL: 0
CHEST TIGHTNESS: 0
EYE PAIN: 0

## 2023-04-28 ASSESSMENT — ACTIVITIES OF DAILY LIVING (ADL): ADLS_ACUITY_SCORE: 35

## 2023-04-28 NOTE — ED TRIAGE NOTES
Pt arrived with complaints of a headache that has been persistent in the back of his head for the past several months, worse the past couple weeks.  Pt states that he can usually take a tylenol and headache will disappear but not effective lately.       Triage Assessment     Row Name 04/28/23 0934       Triage Assessment (Adult)    Airway WDL WDL       Respiratory WDL    Respiratory WDL WDL       Skin Circulation/Temperature WDL    Skin Circulation/Temperature WDL WDL       Cardiac WDL    Cardiac WDL WDL       Peripheral/Neurovascular WDL    Peripheral Neurovascular WDL WDL       Cognitive/Neuro/Behavioral WDL    Cognitive/Neuro/Behavioral WDL WDL

## 2023-04-28 NOTE — ED PROVIDER NOTES
[facility] Emergency Department Encounter      Chief Complaint   Patient presents with     Headache       HPI:  Vinayak Leavitt is a 22 year old male with PMH of Autism Spectrum Disorder, paroxysmal SVT  who presents to the emergency department for the evaluation of headaches.   Patient presents with a roughly 2 week relapsing course of headaches. Patient has a history of pSVT well controlled with metoprolol daily as well as a history of anxiety controlled with PRN hydroxyzine. Patient states that his headache has been progressively worse over the past 2 or 3 days which prompted him to come to the ER for further evaluation.  Patient denies any history of focal neurodeficits, denies any history of new rashes or vision changes.  Denies any nausea, vomiting, diarrhea.  Patient recently seen yesterday in clinic for dental pain and prescribed 10 day course of augmentin due to suspicion of dental infection. Has been compliant with the medication and denies any history of fevers, chills, nor rigors.   Patient mother does have a history of a brain aneurysm and patient is currently worried that he may also have a brain aneurysm that has not been yet diagnosed.        Past Medical History:   Diagnosis Date     Otitis media     6/6/09,left- treated with Zithromax     Streptococcal pharyngitis     No Comments Provided       Patient Active Problem List   Diagnosis     Acne     Active autistic disorder     Paroxysmal supraventricular tachycardia (H)       Past Surgical History:   Procedure Laterality Date     OTHER SURGICAL HISTORY      [id number],OTHER,lacerated tendon     OTHER SURGICAL HISTORY      [id number],OTHER,Times two     TONSILLECTOMY      06/2017       Family History:  family history includes Attention Deficit Disorder in his maternal half-sister; Heart Disease in his mother; Other - See Comments in his father; Substance Abuse in his maternal half-sister; Thyroid nodules in his mother.    Social History:   reports  that he has never smoked. He has never used smokeless tobacco. He reports that he does not drink alcohol and does not use drugs.    Allergies:  No Known Allergies    Home Medications:  Discharge Medication List as of 4/28/2023 11:37 AM      CONTINUE these medications which have NOT CHANGED    Details   acetaminophen (TYLENOL) 500 MG tablet Take 1,000 mg by mouth every 6 hours as needed, Historical      amoxicillin-clavulanate (AUGMENTIN) 875-125 MG tablet Take 1 tablet by mouth 2 times daily for 10 days, Disp-20 tablet, R-0, E-Prescribe      chlorhexidine (PERIDEX) 0.12 % solution Swish and spit 15 mLs in mouth 2 times daily, Disp-473 mL, R-0, E-Prescribe      hydrOXYzine (ATARAX) 25 MG tablet Take 1 tablet (25 mg) by mouth 3 times daily as needed for anxiety, Disp-90 tablet, R-0, E-Prescribe      ibuprofen (ADVIL/MOTRIN) 200 MG tablet Take 400 mg by mouth every 4 hours as needed for mild pain, Historical      metoprolol succinate ER (TOPROL XL) 25 MG 24 hr tablet Take 1 tablet (25 mg) by mouth every evening, Disp-90 tablet, R-3, E-Prescribe      polyethylene glycol (MIRALAX) 17 GM/Dose powder Take 17 g (1 capful) by mouth daily, Disp-507 g, R-1, E-Prescribe      vitamin D3 (CHOLECALCIFEROL) 50 mcg (2000 units) tablet Take 1 tablet (50 mcg) by mouth daily, Disp-90 tablet, R-4, E-Prescribe      SODIUM FLUORIDE 5000 PPM 1.1 % PSTE dental paste BRUSH WITH PEA SIZED AMOUNT IN THE MORNING AND AT NIGHT RIGHT BEFORE BED. EXPECTORATE EXCESS, DO NOT RINSE WITH WATER AFTER BRUSHING., SRINIVASA, Historical           The medication list in this chart was made based on data that was provided to ED nursing staff by the patient as well as what was previously in the EPIC chart.  This information was entered into the chart by the ED nursing staff or pharmacy tech.  It has been verified to the best of my ability.    ROS: See HPI  Review of Systems   Constitutional: Negative for activity change, appetite change, chills, diaphoresis, fatigue  "and fever.   HENT: Positive for dental problem. Negative for congestion, hearing loss, sinus pressure, sinus pain and sneezing.    Eyes: Negative for pain.   Respiratory: Negative for choking, chest tightness, shortness of breath, wheezing and stridor.    Cardiovascular: Negative for chest pain.   Gastrointestinal: Negative for blood in stool, constipation, diarrhea, nausea and vomiting.   Genitourinary: Negative for difficulty urinating, dysuria and hematuria.   Musculoskeletal: Negative for arthralgias, back pain, gait problem and joint swelling.   Neurological: Positive for headaches. Negative for dizziness, tremors, seizures, syncope, weakness, light-headedness and numbness.   Psychiatric/Behavioral: Negative for dysphoric mood and hallucinations.           Physical Exam:     Vitals:    04/28/23 0932 04/28/23 1030   BP: 139/86 122/78   Pulse: 64 82   Resp: 16    Temp: 97.1  F (36.2  C)    TempSrc: Tympanic    SpO2: 99% 96%   Weight: 78.9 kg (174 lb)    Height: 1.689 m (5' 6.5\")      No results found for: SPO2  Body mass index is 27.66 kg/m .  Vital signs were reviewed    Physical Exam  Constitutional:       General: He is not in acute distress.     Appearance: Normal appearance. He is not toxic-appearing.   HENT:      Head: Normocephalic and atraumatic.      Nose: No congestion.   Eyes:      General: No scleral icterus.     Extraocular Movements: Extraocular movements intact.      Conjunctiva/sclera: Conjunctivae normal.      Pupils: Pupils are equal, round, and reactive to light.   Cardiovascular:      Rate and Rhythm: Normal rate and regular rhythm.      Heart sounds: No murmur heard.     No friction rub. No gallop.   Pulmonary:      Effort: Pulmonary effort is normal. No respiratory distress.   Abdominal:      General: Abdomen is flat. There is no distension.      Palpations: Abdomen is soft.   Musculoskeletal:         General: No swelling or tenderness.      Cervical back: Neck supple. No tenderness. "   Lymphadenopathy:      Cervical: No cervical adenopathy.   Skin:     General: Skin is warm.      Capillary Refill: Capillary refill takes less than 2 seconds.      Findings: No rash.   Neurological:      General: No focal deficit present.      Mental Status: He is alert.      Cranial Nerves: No cranial nerve deficit.      Sensory: No sensory deficit.      Motor: No weakness.      Coordination: Coordination normal.      Gait: Gait normal.      Comments: No sensory abnormalities.  No occipital posterior scalp tenderness to palpation.   Psychiatric:         Mood and Affect: Mood normal.         Behavior: Behavior normal.              Results:     Labs:  Results for orders placed or performed during the hospital encounter of 04/28/23 (from the past 24 hour(s))   CBC with platelets differential    Narrative    The following orders were created for panel order CBC with platelets differential.  Procedure                               Abnormality         Status                     ---------                               -----------         ------                     CBC with platelets and d...[[id number]]                      Final result                 Please view results for these tests on the individual orders.   Comprehensive metabolic panel   Result Value Ref Range    Sodium 140 136 - 145 mmol/L    Potassium 4.2 3.4 - 5.3 mmol/L    Chloride 102 98 - 107 mmol/L    Carbon Dioxide (CO2) 29 22 - 29 mmol/L    Anion Gap 9 7 - 15 mmol/L    Urea Nitrogen 17.7 6.0 - 20.0 mg/dL    Creatinine 1.02 0.67 - 1.17 mg/dL    Calcium 9.7 8.6 - 10.0 mg/dL    Glucose 123 (H) 70 - 99 mg/dL    Alkaline Phosphatase 62 40 - 129 U/L    AST 25 10 - 50 U/L    ALT 30 10 - 50 U/L    Protein Total 7.1 6.4 - 8.3 g/dL    Albumin 4.4 3.5 - 5.2 g/dL    Bilirubin Total 0.6 <=1.2 mg/dL    GFR Estimate >90 >60 mL/min/1.73m2   CBC with platelets and differential   Result Value Ref Range    WBC Count 7.0 4.0 - 11.0 10e3/uL    RBC Count 5.48 4.40 - 5.90  10e6/uL    Hemoglobin 14.8 13.3 - 17.7 g/dL    Hematocrit 44.7 40.0 - 53.0 %    MCV 82 78 - 100 fL    MCH 27.0 26.5 - 33.0 pg    MCHC 33.1 31.5 - 36.5 g/dL    RDW 12.2 10.0 - 15.0 %    Platelet Count 259 150 - 450 10e3/uL    % Neutrophils 66 %    % Lymphocytes 24 %    % Monocytes 7 %    % Eosinophils 2 %    % Basophils 1 %    % Immature Granulocytes 0 %    NRBCs per 100 WBC 0 <1 /100    Absolute Neutrophils 4.6 1.6 - 8.3 10e3/uL    Absolute Lymphocytes 1.7 0.8 - 5.3 10e3/uL    Absolute Monocytes 0.5 0.0 - 1.3 10e3/uL    Absolute Eosinophils 0.2 0.0 - 0.7 10e3/uL    Absolute Basophils 0.1 0.0 - 0.2 10e3/uL    Absolute Immature Granulocytes 0.0 <=0.4 10e3/uL    Absolute NRBCs 0.0 10e3/uL   CTA Head Neck with Contrast    Narrative    CTA HEAD NECK W CONTRAST    HISTORY: 22 years Male chronic headaches, mother with history of known  brain aneruysm    COMPARISON: None    TECHNIQUE: Contrast-enhanced CT angiography of the neck and head was  performed with intervenous contrast. Multiplanar reconstructions and  MIP, volume rendered and 3-D MIP reconstructions were obtained on a  3-D workstation.  This exam was performed using one or more of the following dose  reduction techniques:  Automated exposure control, adjustment of the MAYLIN and/or KV according  to patient's size, and/or use of iterative reconstruction technique.    FINDINGS:  CT head without contrast:: Ventricles and sulci are symmetric. The  gray-white matter differentiation throughout the brain is well  maintained. There is no evidence of intracranial mass or hemorrhage.  Visualized portions of the paranasal sinuses and mastoid air cells are  well aerated. There is no evidence of skull fracture.      Right neck:            Brachiocephalic artery: patent            Common carotid artery:  patent            Internal carotid artery:Widely patent            Vertebral artery:Widely patent                Left neck:            Common carotid artery: Widely patent             Internal carotid artery:Widely patent            Vertebral artery:Widely patent      Head:                      Anterior circulation:The vessels are patent without evidence  of abrupt cut off. There is no evidence of aneurysm.           Posterior circulation:The vessels are patent. There is no  abrupt cut off. There is no evidence of aneurysm      Impression    IMPRESSION:No acute findings. There is no evidence of significant  carotid artery stenosis. There is no evidence of large vessel  occlusion or intracranial aneurysm.    GLENDY TOLBERT MD         SYSTEM ID:  [id number]       labs reviewed.  All abnormalities noted and addressed if appropriate.    Radiology:  CTA Head Neck with Contrast  Narrative: CTA HEAD NECK W CONTRAST    HISTORY: 22 years Male chronic headaches, mother with history of known  brain aneruysm    COMPARISON: None    TECHNIQUE: Contrast-enhanced CT angiography of the neck and head was  performed with intervenous contrast. Multiplanar reconstructions and  MIP, volume rendered and 3-D MIP reconstructions were obtained on a  3-D workstation.  This exam was performed using one or more of the following dose  reduction techniques:  Automated exposure control, adjustment of the MAYLIN and/or KV according  to patient's size, and/or use of iterative reconstruction technique.    FINDINGS:  CT head without contrast:: Ventricles and sulci are symmetric. The  gray-white matter differentiation throughout the brain is well  maintained. There is no evidence of intracranial mass or hemorrhage.  Visualized portions of the paranasal sinuses and mastoid air cells are  well aerated. There is no evidence of skull fracture.    Right neck:            Brachiocephalic artery: patent            Common carotid artery:  patent            Internal carotid artery:Widely patent            Vertebral artery:Widely patent                Left neck:            Common carotid artery: Widely patent            Internal carotid  artery:Widely patent            Vertebral artery:Widely patent    Head:                      Anterior circulation:The vessels are patent without evidence  of abrupt cut off. There is no evidence of aneurysm.           Posterior circulation:The vessels are patent. There is no  abrupt cut off. There is no evidence of aneurysm  Impression: IMPRESSION:No acute findings. There is no evidence of significant  carotid artery stenosis. There is no evidence of large vessel  occlusion or intracranial aneurysm.    GLENDY TOLBERT MD         SYSTEM ID:  [id number]           ED Course:     The patient arrived by private car and is alone. The patient was triaged to room 05.  An IV was placed and labs were drawn.  I reviewed the patient's vital signs and a history and physical exam were completed.  The patient was maintained on appropriate monitoring.        Medications given in the ED:  Medications   iopamidol (ISOVUE-370) solution 75 mL ( Intravenous Canceled Entry 4/28/23 1050)   0.9% sodium chloride BOLUS (0 mLs Intravenous Stopped 4/28/23 1137)   ketorolac (TORADOL) injection 30 mg (30 mg Intravenous $Given 4/28/23 1022)   prochlorperazine (COMPAZINE) injection 10 mg (10 mg Intravenous $Given 4/28/23 1022)   diphenhydrAMINE (BENADRYL) injection 25 mg (25 mg Intravenous $Given 4/28/23 1022)   iopamidol (ISOVUE-370) solution 75 mL (75 mLs Intravenous $Given 4/28/23 1052)         ED Course as of 04/28/23 1154   Fri Apr 28, 2023 0958 Patient evaluated with Dr. Braxton.  No focal neurologic deficit, signs of infection.  Start treatment for migraine like syndrome.  Treat with IV fluids, IV Benadryl, IV Compazine, IV Toradol.   0959 Patient is concerned about family history of brain aneurysm, noted in his mother. Given persistent posterior head pain, would like to pursue CTA head. Orders placed.    1050 CBC normal.   1126 Head CTA negative.  Random glucose elevated, otherwise metabolic panel normal.   1127 Patient reports feeling  better after above interventions.    Discharged home in stable, improved condition.   Consider close outpatient follow-up.    Consider starting migraine prevention medications.         Procedures: none      Medical Decision Making:     Differential Diagnosis Includes but is not limited to: Migraine, tension headache, cluster headache, occipital neuralgia, aneurysm, NPH, pseudotumor cerebri     Impression:  1. Migraine without aura and with status migrainosus, not intractable         Patient with otherwise benign workup. CTA head negative for acute intracranial abnormality, no e/o aneurysm and patient reassured about this. Patient received IV abortive medications for suspected status migrainosus and overall had improvement with this.     Patient recommended to follow up with PCP for consideration of inderall for migraines given his comorbid condition of pSVT and current use of metoprolol. Patient could also consider ppx medication of Amitriptyline in the future per PCP.     Discussed this plan and patient and his mother felt comfortable with the patient discharging to home.     Plan: discontinue home. Return precautions discussed and provided.    I have reviewed the findings, diagnosis, plan and need for any follow up with the patient.         Critical Care Time: none    Disposition:  home    Discharge Medication List as of 4/28/2023 11:37 AM            4/28/2023   PGY-3  [facility]     Fahad Arceo MD  Resident  04/28/23 1133        Physician Attestation     I, Taj Palacio MD, saw and evaluated Vinayak Leavitt as part of a shared visit with resident physician, Dr. Fahad Braxton    I personally reviewed the vital signs, medications, labs and imaging as pertinent to this encounter.     I personally provided a substantive portion of the care of this patient. I personally performed the entire medical decision making and I agree with the assessment and plan as written by the resident  physician. Please see the resident physician's documentation for full details.      Vinayak Leavitt is a 22 year old year old male who was personally evaluated today.   Key management decisions made by me and carried out under my direction:     ICD-10-CM    1. Migraine without aura and with status migrainosus, not intractable  G43.001           Migraine headache.  Treated with IV fluids, IV Compazine, Benadryl, Toradol.  CTA negative  Outpatient management plan to be developed at clinic follow-up appointment.  Home care instructions provided.      Key additional history findings made by me: Fairly continuous, fluctuating posterior headache concerning for migraine.    I concur with the DOMI exam findings, in addition I have noted: No focal neurologic deficit.  Heart and lungs are unremarkable.  No reproducible posterior scalp tenderness to palpation.  No focal weakness, visual disturbance, vision loss.    Taj Palacio MD  Date of Service (when I saw the patient): 04/28/23    Physician Attestation   I personally saw and evaluated Vinayak Leavitt as part of a shared visit.  I have reviewed and discussed with the resident physician their discharge plan.       Taj Palacio MD  04/28/23 1118

## 2023-04-28 NOTE — DISCHARGE INSTRUCTIONS
During this ER visit you were seen and evaluated for your history of a headache. We scanned your brain with a CAT scan and did not find any abnormalities. We also gave you some IV medications to help with your headache. Overall I recommend that you follow up with your primary care physician to consider changing your medications or giving you additional medications to help prevent migraines and to stop migraines in the future. You can try to take some B vitamin supplements or Mg supplements over the counter as sometimes these can be helpful in migraines, but you can also talk to your primary care provider about these as well. Please do not hesitate to return for further treatment or evaluation should your headache get worse or change in nature.       Migraine headache -- Sensitive Brain - Doesn't Like Change.  New Drug = Change.     -- review the web site: www.managingmigraine.org    If getting 3 or more times per month --- Start For Migraine Prevention :   - Magnesium 400-800 mg Daily. (Consider Slo-Mag or Slow-Mag)  - Coenzyme Q10 (CO Q-10) 200 mg capsule; Take 1 capsule by mouth 2 times daily.   - Riboflavin (VITAMIN B2) 100 mg tablet; Take 2 tablets by mouth 2 times daily.   + consider Vitamin B12 - (can take in a B-complex)      Strong emphasis on Behavorial Intervention - Stress Management, Regular sleeping habits/schedules, etc.      Only 10 abortive medication treatments should be used per month -- to prevent worsening of your migraines.     If you develop a migraine -- at onset of MILD Migraine Headache Try:     -- Alkaseltzer 3 tablets -or- Ariana Advanced Asprin -or- Excedrine Migraine -or- Naproxen 500 mg.  and   -- Magnesium 500 mg -- has a prokinetic effect and can help with nausea.     Taking a nap in a dark room, can also help.     Other options include Tryptans (Also used at onset of Mild Migraine Pain) like:   - Sumitriptan --   - Naratriptan --  - Rizatriptan -- could schedule x days per month for  menstrual migraine    Nausea medications can also be helpful to use when you start to develop a migraine -- including:   - Metoclopramide (if no sedation tolerance)  - Prochloroperazine (if some sedation is okay) - little more effective.         Daily prophylactic medication options -- should be considered if having 3 or more migraine attacks per month -- include:    Timolol, Propanolol, Valproate, Topamax.    Other approved option:   Botox injections    Typically start Topiramate (Topamax) at 12.5 mg daily x2 weeks, then increase to 25 mg for about 1 month, and then can increase to 37.5 mg daily if needed - For Migraine Prevention     Other helpful agents for migraine prevention:  -- 16 mg candesartan daily can also be considered -- is roughly equal to propranolol in migraine prevention.     Membrane stabilizing agents: Valproate, Topiramate, Gabapentin.     No opiates for Migraines = Poison.  They are ineffective, sub-optimal, and cause migraine progression.     Neurological exam during a migraine is Normal.  No NEED for CT scan in the ER.    ER treatments if needed:  Most effective -- IV fluids (Saline) and Toradol.     ER treatments also sometimes used:  Droperidol (inapsine)  Diphenhydramine (premedicates prior to using neuroleptics)  Dihydroergotamine (DHE)  Prochlorperazine  Promethazine -  (Phenergan) - IM Shot  IV valproate - 1 gram IV push  IV Magnesium.

## 2023-06-22 NOTE — PATIENT INSTRUCTIONS
The Augmentin you are taking for dental infection is adequate coverage for cat bites and preventing infection. You do not need any additional medication.   Monitor the site for redness, warmth, severe pain, drainage, or if you get a fever/chills please follow up.        Yes

## 2023-09-01 ENCOUNTER — HOSPITAL ENCOUNTER (OUTPATIENT)
Dept: GENERAL RADIOLOGY | Facility: OTHER | Age: 23
Discharge: HOME | End: 2023-09-01
Attending: STUDENT IN AN ORGANIZED HEALTH CARE EDUCATION/TRAINING PROGRAM
Payer: COMMERCIAL

## 2023-09-01 ENCOUNTER — OFFICE VISIT (OUTPATIENT)
Dept: FAMILY MEDICINE | Facility: OTHER | Age: 23
End: 2023-09-01
Payer: COMMERCIAL

## 2023-09-01 VITALS
SYSTOLIC BLOOD PRESSURE: 114 MMHG | TEMPERATURE: 97.6 F | WEIGHT: 178.4 LBS | HEART RATE: 70 BPM | RESPIRATION RATE: 16 BRPM | BODY MASS INDEX: 28.67 KG/M2 | DIASTOLIC BLOOD PRESSURE: 78 MMHG | OXYGEN SATURATION: 99 % | HEIGHT: 66 IN

## 2023-09-01 DIAGNOSIS — M25.561 ACUTE PAIN OF RIGHT KNEE: Primary | ICD-10-CM

## 2023-09-01 DIAGNOSIS — M25.561 ACUTE PAIN OF RIGHT KNEE: ICD-10-CM

## 2023-09-01 PROCEDURE — G0463 HOSPITAL OUTPT CLINIC VISIT: HCPCS

## 2023-09-01 PROCEDURE — 73562 X-RAY EXAM OF KNEE 3: CPT | Mod: RT

## 2023-09-01 PROCEDURE — 99213 OFFICE O/P EST LOW 20 MIN: CPT | Performed by: STUDENT IN AN ORGANIZED HEALTH CARE EDUCATION/TRAINING PROGRAM

## 2023-09-01 ASSESSMENT — PAIN SCALES - GENERAL: PAINLEVEL: MODERATE PAIN (4)

## 2023-09-01 NOTE — PATIENT INSTRUCTIONS
Right knee pain    Rest, ice, compression, elevation.    Ibuprofen and/or Tylenol.    Follow-up with orthopedics if symptoms persist.    Return to rapid clinic or go to the ER if symptoms worsen or change.

## 2023-09-01 NOTE — NURSING NOTE
"Chief Complaint   Patient presents with    Musculoskeletal Problem     X 3 days R knee     Tx with ice with some relief.    Initial /78 (BP Location: Left arm, Patient Position: Sitting, Cuff Size: Adult Regular)   Pulse 70   Temp 97.6  F (36.4  C) (Tympanic)   Resp 16   Ht 1.676 m (5' 6\")   Wt 80.9 kg (178 lb 6.4 oz)   SpO2 99%   BMI 28.79 kg/m   Estimated body mass index is 28.79 kg/m  as calculated from the following:    Height as of this encounter: 1.676 m (5' 6\").    Weight as of this encounter: 80.9 kg (178 lb 6.4 oz).       FOOD SECURITY SCREENING QUESTIONS:    The next two questions are to help us understand your food security.  If you are feeling you need any assistance in this area, we have resources available to support you today.    Hunger Vital Signs:  Within the past 12 months we worried whether our food would run out before we got money to buy more. Never  Within the past 12 months the food we bought just didn't last and we didn't have money to get more. Never  Deanna Menon LPN,LPN on 9/1/2023 at 11:37 AM      Deanna Menon LPN     "

## 2023-09-01 NOTE — PROGRESS NOTES
"  Assessment & Plan     (M25.561) Acute pain of right knee  (primary encounter diagnosis)  Comment: Acute pain of the knee, most likely soft tissue.  Discussed that it is most likely a pulled muscle or overstretched ligament/tendon.  No evidence of full rupture at this time.  EXTR imaging was completed without evidence of fracture or gross abnormality of the knee.    Plan: XR Knee Right 3 Views, Orthopedic          Referral    At this time, discussed conservative management.  To perform activities that are tolerated and to avoid activities that cause pain or stress on the knee.  Ibuprofen and Tylenol.  Ace wrap for compression.  Elevation and ice.  Follow-up with orthopedics if symptoms persist greater than 4-6 more weeks.  Return to rapid clinic or go to the ER if symptoms worsen or change in the meantime.        Sharon Casarez PA-C  [facility] AND [facility]   Vinayak is a 22 year old, presenting for the following health issues:  Musculoskeletal Problem (X 3 days R knee)      HPI     Patient presents today with right knee pain.  States this started about 3 days ago, has been getting worse.  States it is over the top in the middle part of the knee.  He has been using ice, ibuprofen.  He does participate in sports activities including long distance running and MMA fighting.  He states that these have made his symptoms worse.      Review of Systems   Constitutional, HEENT, cardiovascular, pulmonary, gi and gu systems are negative, except as otherwise noted.      Objective    /78 (BP Location: Left arm, Patient Position: Sitting, Cuff Size: Adult Regular)   Pulse 70   Temp 97.6  F (36.4  C) (Tympanic)   Resp 16   Ht 1.676 m (5' 6\")   Wt 80.9 kg (178 lb 6.4 oz)   SpO2 99%   BMI 28.79 kg/m    Body mass index is 28.79 kg/m .    Physical Exam   GENERAL: healthy, alert and no distress  RESP: lungs clear to auscultation - no rales, rhonchi or wheezes  CV: regular rate and rhythm, " normal S1 S2, no S3 or S4, no murmur, click or rub, no peripheral edema and peripheral pulses strong  MS: no gross musculoskeletal defects noted, slight tenderness palpation over the superior and medial aspect of the right knee, no tenderness over the joint spaces medial nor lateral, no tenderness inferior to the knee nor in the posterior knee area, full range of motion though there is pain with full flexion, minimal pain with extension, strength 5 out of 5 with quads and hamstrings, negative anterior & posterior drawer      Results for orders placed or performed during the hospital encounter of 09/01/23   XR Knee Right 3 Views     Status: None    Narrative    PROCEDURE: XR KNEE RIGHT 3 VIEWS 9/1/2023 12:18 PM    HISTORY: pain in anterior/superior knee; Acute pain of right knee    COMPARISONS: None.    TECHNIQUE: 3 views.    FINDINGS: No acute fracture or dislocation is seen. There is no focal  bone lesion. There is no joint effusion.         Impression    IMPRESSION: No acute bony abnormality.    FABIAN RODRIGUEZ MD         SYSTEM ID:  [id number]

## 2023-09-08 ENCOUNTER — HOSPITAL ENCOUNTER (EMERGENCY)
Facility: OTHER | Age: 23
Discharge: HOME | End: 2023-09-08
Attending: PHYSICIAN ASSISTANT | Admitting: PHYSICIAN ASSISTANT
Payer: COMMERCIAL

## 2023-09-08 ENCOUNTER — APPOINTMENT (OUTPATIENT)
Dept: GENERAL RADIOLOGY | Facility: OTHER | Age: 23
End: 2023-09-08
Attending: PHYSICIAN ASSISTANT
Payer: COMMERCIAL

## 2023-09-08 VITALS
BODY MASS INDEX: 28.93 KG/M2 | RESPIRATION RATE: 16 BRPM | DIASTOLIC BLOOD PRESSURE: 57 MMHG | SYSTOLIC BLOOD PRESSURE: 124 MMHG | WEIGHT: 180 LBS | OXYGEN SATURATION: 99 % | HEART RATE: 105 BPM | HEIGHT: 66 IN | TEMPERATURE: 100.1 F

## 2023-09-08 DIAGNOSIS — J02.9 PHARYNGITIS: ICD-10-CM

## 2023-09-08 DIAGNOSIS — R05.9 COUGH: ICD-10-CM

## 2023-09-08 LAB
FLUAV RNA SPEC QL NAA+PROBE: NEGATIVE
FLUBV RNA RESP QL NAA+PROBE: NEGATIVE
GROUP A STREP BY PCR: NOT DETECTED
RSV RNA SPEC NAA+PROBE: NEGATIVE
SARS-COV-2 RNA RESP QL NAA+PROBE: NEGATIVE

## 2023-09-08 PROCEDURE — 87651 STREP A DNA AMP PROBE: CPT | Performed by: PHYSICIAN ASSISTANT

## 2023-09-08 PROCEDURE — 87637 SARSCOV2&INF A&B&RSV AMP PRB: CPT | Performed by: EMERGENCY MEDICINE

## 2023-09-08 PROCEDURE — 99284 EMERGENCY DEPT VISIT MOD MDM: CPT | Mod: 25

## 2023-09-08 PROCEDURE — 87637 SARSCOV2&INF A&B&RSV AMP PRB: CPT | Performed by: PHYSICIAN ASSISTANT

## 2023-09-08 PROCEDURE — 250N000013 HC RX MED GY IP 250 OP 250 PS 637: Performed by: EMERGENCY MEDICINE

## 2023-09-08 PROCEDURE — 99284 EMERGENCY DEPT VISIT MOD MDM: CPT | Performed by: PHYSICIAN ASSISTANT

## 2023-09-08 PROCEDURE — 71046 X-RAY EXAM CHEST 2 VIEWS: CPT | Mod: TC

## 2023-09-08 PROCEDURE — C9803 HOPD COVID-19 SPEC COLLECT: HCPCS

## 2023-09-08 RX ORDER — ACETAMINOPHEN 325 MG/1
650 TABLET ORAL ONCE
Status: COMPLETED | OUTPATIENT
Start: 2023-09-08 | End: 2023-09-08

## 2023-09-08 RX ORDER — IBUPROFEN 600 MG/1
600 TABLET, FILM COATED ORAL EVERY 6 HOURS PRN
Qty: 30 TABLET | Refills: 0 | Status: SHIPPED | OUTPATIENT
Start: 2023-09-08

## 2023-09-08 RX ORDER — AZITHROMYCIN 250 MG/1
250 TABLET, FILM COATED ORAL DAILY
Qty: 6 TABLET | Refills: 0 | Status: SHIPPED | OUTPATIENT
Start: 2023-09-08

## 2023-09-08 RX ADMIN — ACETAMINOPHEN 650 MG: 325 TABLET ORAL at 19:04

## 2023-09-08 NOTE — ED TRIAGE NOTES
"Patient being evaluated today for 2 day history of sore throat, cough, nasal congestion, and fever. Patient last took tylenol at 0200 and cough medicine this am. /57   Pulse 105   Temp (!) 101.6  F (38.7  C) (Tympanic)   Resp 16   Ht 1.676 m (5' 6\")   Wt 81.6 kg (180 lb)   SpO2 99%   BMI 29.05 kg/m         Triage Assessment       Row Name 09/08/23 7475       Triage Assessment (Adult)    Airway WDL WDL       Respiratory WDL    Respiratory WDL WDL       Skin Circulation/Temperature WDL    Skin Circulation/Temperature WDL WDL       Cardiac WDL    Cardiac WDL WDL       Peripheral/Neurovascular WDL    Peripheral Neurovascular WDL WDL       Cognitive/Neuro/Behavioral WDL    Cognitive/Neuro/Behavioral WDL WDL                    "

## 2023-09-09 NOTE — DISCHARGE INSTRUCTIONS
-Take Z-Jesús for the next 5 days  -Alternate ibuprofen 600 mg and Tylenol 500 to 1000 mg for pain and fever.  -Recommend following up with your primary care doctor in 3 to 5 days for reevaluation  -Please return to the ER for any worsening of symptoms.

## 2023-09-10 NOTE — ED PROVIDER NOTES
"      EMERGENCY DEPARTMENT ENCOUNTER      NAME: Vinayak Leavitt  AGE: 22 year old male  YOB: 2000  MRN: [id number]  EVALUATION DATE & TIME: 9/8/2023  9:30 PM    PCP: Raquel Elliott    ED PROVIDER: Andres Bergman PA-C       CHIEF COMPLAINT:  Chief Complaint   Patient presents with    Pharyngitis     With nasal congestion and headache    Cough     X 2 days       ED COURSE, MEDICAL DECISION MAKING, FINAL IMPRESSION AND PLAN:     The patient was interviewed and examined.  HPI and physical exam as below.  Differential diagnosis and MDM Key Documentation Elements as below.  Vitals and triage note were reviewed.  /57   Pulse 105   Temp 100.1  F (37.8  C) (Tympanic)   Resp 16   Ht 1.676 m (5' 6\")   Wt 81.6 kg (180 lb)   SpO2 99%   BMI 29.05 kg/m      Vinayak Leavitt is a pleasant 22 year old male sore throat, headache, and cough ongoing for the past 2 days.  Symptoms have slowly been worsening.  Patient now having a fever today.  Patient states he took Tylenol this morning at 2 AM some cough medication with no significant proved in symptoms.  Patient denying any chest pain, abdominal pain, vomiting, or diarrhea.  No recent sick contact.  No stiff neck.    Differential includes but is not limited to upper respiratory infection, pneumonia, strep pharyngitis, mono    Patient today was febrile with mild tachycardia.  Patient was in no acute distress.  Patient with noted congestion.  No meningeal signs.  HEENT was otherwise normal.  Lungs were clear.  Heart tachycardic otherwise negative for murmurs, gallops, rubs.  Rapid COVID, RSV, influenza were negative.  Rapid strep negative.  Chest x-ray negative.    Assessment / Plan:  1. Pharyngitis    2. Cough    -Z-Jesús x5 days  -Alternate ibuprofen and Tylenol for fever  -Follow-up with primary care doctor in 3 to 5 days.  -Turn to the ER for any worsening of symptoms.        Pertinent Labs & Imaging studies reviewed. (See chart for " details)  Results for orders placed or performed during the hospital encounter of 09/08/23   XR Chest 2 Views    Impression    IMPRESSION:   No evidence of acute pathology.     THIS DOCUMENT HAS BEEN ELECTRONICALLY SIGNED BY ALLYN GUERRA MD   Symptomatic Influenza A/B, RSV, & SARS-CoV2 PCR (COVID-19) Nose    Specimen: Nose; Swab   Result Value Ref Range    Influenza A PCR Negative Negative    Influenza B PCR Negative Negative    RSV PCR Negative Negative    SARS CoV2 PCR Negative Negative   Group A Streptococcus PCR Throat Swab    Specimen: Throat; Swab   Result Value Ref Range    Group A strep by PCR Not Detected Not Detected     No results found for: ABORH    Medications given during today's ER visit:  Medications   acetaminophen (TYLENOL) tablet 650 mg (650 mg Oral $Given 9/8/23 1904)       New prescriptions started at today's ER visit  Discharge Medication List as of 9/8/2023 10:09 PM        START taking these medications    Details   azithromycin (ZITHROMAX) 250 MG tablet Take 1 tablet (250 mg) by mouth daily, Disp-6 tablet, R-0, [facility]           Reassessments, Medications, Interventions, & Response to Treatments:  Updated patient on results.  Discussed treatment plan and follow-up    Consultations:  None    Decision Rules, Medical Calculators, and Risk Stratification Tools:  None    MDM Key Documentation Elements for Patient's Evaluation:  Differential diagnosis to include high risk considerations: As above  Escalation to admission/observation considered: Admission/observation considered, but patient does not meet admission criteria  Discussions and management with other clinicians:    3a. Independent interpretation of testing performed by another health professional:  -No  3b. Discussion of management or test interpretation with another health professional: -No  Independent interpretation of tests:  Ordering and/or review of 3 test(s)  Discussion of test interpretations with radiology:  No  History  obtained from source other than patient or assessment requiring an independent historian:  No  Review of non-ED/external records:  review of 1 records  Diagnostic tests considered but not ultimately performed/deferred:  - mono  Prescription medications considered but not prescribed:  -Augmentin  Chronic conditions affecting care:  -None  Care affected by social determinants of health:  -None    The patient's management involved:   - Laboratory studies  - Imaging studies  - Prescription drug management      A shared decision making model was used. Time was taken to answer all questions.  Patient and/or associated parties understood and were agreeable to treatment plan.  Plan and all results were discussed. Warning signs and close return precautions to return to the ED given. Copy of results given. Discharged in stable condition. Discharged with discharge instructions outlining plan for further care and follow up.      PPE worn during patient evaluation:  Mask: No  Eye Protection: No  Gown: No  Hair cover: No  Face Shield: No  Patient wearing a mask: No    =================================================================    HPI  Vinayak Leavitt is a pleasant 22 year old male sore throat, headache, and cough ongoing for the past 2 days.  Symptoms have slowly been worsening.  Patient now having a fever today.  Patient states he took Tylenol this morning at 2 AM some cough medication with no significant proved in symptoms.  Patient denying any chest pain, abdominal pain, vomiting, or diarrhea.  No recent sick contact.  No stiff neck.      REVIEW OF SYSTEMS   Review of Systems  As above, otherwise ROS is unremarkable.      PAST MEDICAL HISTORY:  Past Medical History:   Diagnosis Date    Otitis media     6/6/09,left- treated with Zithromax    Streptococcal pharyngitis     No Comments Provided       PAST SURGICAL HISTORY:  Past Surgical History:   Procedure Laterality Date    OTHER SURGICAL HISTORY       [id number],OTHER,lacerated tendon    OTHER SURGICAL HISTORY      [id number],OTHER,Times two    TONSILLECTOMY      06/2017       CURRENT MEDICATIONS:    Current Outpatient Medications   Medication Instructions    acetaminophen (TYLENOL) 1,000 mg, Oral, EVERY 6 HOURS PRN    azithromycin (ZITHROMAX) 250 mg, Oral, DAILY    chlorhexidine (PERIDEX) 0.12 % solution 15 mLs, Swish & Spit, 2 TIMES DAILY    hydrOXYzine (ATARAX) 25 mg, Oral, 3 TIMES DAILY PRN    ibuprofen (ADVIL/MOTRIN) 600 mg, Oral, EVERY 6 HOURS PRN    metoprolol succinate ER (TOPROL XL) 25 mg, Oral, EVERY EVENING    polyethylene glycol (MIRALAX) 17 GM/Dose powder 1 capful., Oral, DAILY    SODIUM FLUORIDE 5000 PPM 1.1 % PSTE dental paste BRUSH WITH PEA SIZED AMOUNT IN THE MORNING AND AT NIGHT RIGHT BEFORE BED. EXPECTORATE EXCESS, DO NOT RINSE WITH WATER AFTER BRUSHING.    vitamin D3 (CHOLECALCIFEROL) 50 mcg, Oral, DAILY       ALLERGIES:  No Known Allergies    FAMILY HISTORY:  Family History   Problem Relation Age of Onset    Heart Disease Mother         Heart Disease,cardiac coronary anomaly    Thyroid nodules Mother     Other - See Comments Father         MVA    Attention Deficit Disorder Maternal Half-Sister         ADD / ADHD    Substance Abuse Maternal Half-Sister     Genetic Disorder No family hx of         Genetic,No family history of asthma, allergy or atopic dermatitis.       SOCIAL HISTORY:   Social History     Socioeconomic History    Marital status: Single   Tobacco Use    Smoking status: Never    Smokeless tobacco: Never    Tobacco comments:     Quit smoking: mom smokes around him every once in awhile   Vaping Use    Vaping Use: Never used   Substance and Sexual Activity    Alcohol use: No     Alcohol/week: 0.0 standard drinks of alcohol    Drug use: No    Sexual activity: Never   Social History Narrative    Lives with mother and grandmother.  Mother works at an adult foster care.       PHYSICAL EXAM    VITAL SIGNS: /57   Pulse 105   Temp 100.1  F  "(37.8  C) (Tympanic)   Resp 16   Ht 1.676 m (5' 6\")   Wt 81.6 kg (180 lb)   SpO2 99%   BMI 29.05 kg/m      No data found.    Physical Exam  Constitutional:       Appearance: He is well-developed.   HENT:      Head: Normocephalic.      Right Ear: Tympanic membrane and ear canal normal.      Left Ear: Tympanic membrane normal.      Nose: Congestion present. No rhinorrhea.      Mouth/Throat:      Mouth: Mucous membranes are moist.      Pharynx: Oropharynx is clear. No oropharyngeal exudate or uvula swelling.   Eyes:      Conjunctiva/sclera: Conjunctivae normal.   Cardiovascular:      Rate and Rhythm: Regular rhythm. Tachycardia present.      Heart sounds: No murmur heard.     No friction rub. No gallop.   Pulmonary:      Effort: Pulmonary effort is normal.      Breath sounds: Normal breath sounds. No wheezing, rhonchi or rales.   Abdominal:      General: Bowel sounds are normal.      Palpations: Abdomen is soft.   Musculoskeletal:         General: Normal range of motion.      Cervical back: Normal range of motion and neck supple.   Skin:     General: Skin is warm.      Capillary Refill: Capillary refill takes less than 2 seconds.   Neurological:      General: No focal deficit present.      Mental Status: He is alert.   Psychiatric:         Mood and Affect: Mood normal.              LABS & RADIOLOGY:  All pertinent labs reviewed and interpreted. Reviewed all pertinent imaging. Please see official radiology report.  Results for orders placed or performed during the hospital encounter of 09/08/23   XR Chest 2 Views    Impression    IMPRESSION:   No evidence of acute pathology.     THIS DOCUMENT HAS BEEN ELECTRONICALLY SIGNED BY ALLYN GUERRA MD   Symptomatic Influenza A/B, RSV, & SARS-CoV2 PCR (COVID-19) Nose    Specimen: Nose; Swab   Result Value Ref Range    Influenza A PCR Negative Negative    Influenza B PCR Negative Negative    RSV PCR Negative Negative    SARS CoV2 PCR Negative Negative   Group A Streptococcus " PCR Throat Swab    Specimen: Throat; Swab   Result Value Ref Range    Group A strep by PCR Not Detected Not Detected     XR Chest 2 Views   Final Result   IMPRESSION:    No evidence of acute pathology.       THIS DOCUMENT HAS BEEN ELECTRONICALLY SIGNED BY MD MERLY GALLEGO, Dev Bergman PA-C, personally performed the services described in this documentation, and it is both accurate and complete.       Andres Bergman PA-C  09/10/23 1146

## 2023-10-04 ENCOUNTER — HOSPITAL ENCOUNTER (EMERGENCY)
Facility: OTHER | Age: 23
Discharge: HOME | End: 2023-10-04
Attending: STUDENT IN AN ORGANIZED HEALTH CARE EDUCATION/TRAINING PROGRAM | Admitting: STUDENT IN AN ORGANIZED HEALTH CARE EDUCATION/TRAINING PROGRAM
Payer: COMMERCIAL

## 2023-10-04 VITALS
SYSTOLIC BLOOD PRESSURE: 135 MMHG | DIASTOLIC BLOOD PRESSURE: 86 MMHG | OXYGEN SATURATION: 99 % | HEART RATE: 68 BPM | TEMPERATURE: 97.4 F | RESPIRATION RATE: 18 BRPM

## 2023-10-04 DIAGNOSIS — K08.89 PAIN, DENTAL: ICD-10-CM

## 2023-10-04 PROCEDURE — 99283 EMERGENCY DEPT VISIT LOW MDM: CPT | Performed by: STUDENT IN AN ORGANIZED HEALTH CARE EDUCATION/TRAINING PROGRAM

## 2023-10-04 PROCEDURE — 64400 NJX AA&/STRD TRIGEMINAL NRV: CPT | Performed by: STUDENT IN AN ORGANIZED HEALTH CARE EDUCATION/TRAINING PROGRAM

## 2023-10-04 PROCEDURE — 99207 PR NO CHARGE LOS: CPT | Performed by: STUDENT IN AN ORGANIZED HEALTH CARE EDUCATION/TRAINING PROGRAM

## 2023-10-04 PROCEDURE — 99283 EMERGENCY DEPT VISIT LOW MDM: CPT | Mod: 25 | Performed by: STUDENT IN AN ORGANIZED HEALTH CARE EDUCATION/TRAINING PROGRAM

## 2023-10-04 RX ORDER — BUPIVACAINE HYDROCHLORIDE AND EPINEPHRINE 5; 5 MG/ML; UG/ML
1.8 INJECTION, SOLUTION PERINEURAL ONCE
Status: DISCONTINUED | OUTPATIENT
Start: 2023-10-04 | End: 2023-10-04 | Stop reason: HOSPADM

## 2023-10-04 NOTE — ED PROVIDER NOTES
History     Chief Complaint   Patient presents with    Dental Pain       Vinayak Leavitt is a 23 year old male presenting with dental pain. Pain located in right lower posterior tooth. Symptoms started several days ago. Tooth has been painful in past, but not this bad. There is not any known precipitating etiology. Pain aggravated by chewing or touching affected tooth. No dentist seen regarding this pain due to not finding time and money concern. Denies fever, chills, oral discharge, sore throat, difficulty swallowing, difficulty breathing, shortness of breath. Tylenol being used. Pain is improved by cold.    No Known Allergies    Patient Active Problem List    Diagnosis Date Noted    Paroxysmal supraventricular tachycardia 11/07/2022     Priority: Medium    Active autistic disorder 02/13/2018     Priority: Medium     Overview:   High functioning, photogenic memory.  Diagnosed in early childhood by school psychologist.  Has an IEP and a para at school.  Doing grade level schoolwork.        Acne 05/17/2017     Priority: Medium       Past Medical History:   Diagnosis Date    Otitis media     Streptococcal pharyngitis        Past Surgical History:   Procedure Laterality Date    OTHER SURGICAL HISTORY      [id number],OTHER,lacerated tendon    OTHER SURGICAL HISTORY      [id number],OTHER,Times two    TONSILLECTOMY      06/2017       Family History   Problem Relation Age of Onset    Heart Disease Mother         Heart Disease,cardiac coronary anomaly    Thyroid nodules Mother     Other - See Comments Father         MVA    Attention Deficit Disorder Maternal Half-Sister         ADD / ADHD    Substance Abuse Maternal Half-Sister     Genetic Disorder No family hx of         Genetic,No family history of asthma, allergy or atopic dermatitis.       Social History     Tobacco Use    Smoking status: Never    Smokeless tobacco: Never    Tobacco comments:     Quit smoking: mom smokes around him every once in awhile   Vaping Use     Vaping Use: Never used   Substance Use Topics    Alcohol use: No     Alcohol/week: 0.0 standard drinks of alcohol    Drug use: No       Medications:    amoxicillin-clavulanate (AUGMENTIN) 875-125 MG tablet  acetaminophen (TYLENOL) 500 MG tablet  azithromycin (ZITHROMAX) 250 MG tablet  chlorhexidine (PERIDEX) 0.12 % solution  hydrOXYzine (ATARAX) 25 MG tablet  ibuprofen (ADVIL/MOTRIN) 600 MG tablet  metoprolol succinate ER (TOPROL XL) 25 MG 24 hr tablet  polyethylene glycol (MIRALAX) 17 GM/Dose powder  SODIUM FLUORIDE 5000 PPM 1.1 % PSTE dental paste  vitamin D3 (CHOLECALCIFEROL) 50 mcg (2000 units) tablet        Review of Systems: See HPI for pertinent negative and positive findings.    Physical Exam   /86   Pulse 68   Temp 97.4  F (36.3  C)   Resp 18   SpO2 99%      General: awake, comfortable  HEENT: atraumatic, neck supple, tooth #32 tender to percussion, no palpable fluctuance, good dentition, oropharynx without edema or erythema or masses, uvula midline, no maxillary sinus tenderness, no facial edema  Respiratory: normal effort  Cardiovascular: appears well perfused  Extremities: no gross deformities  Skin: warm, dry, facial skin intact without discoloration  Neuro: alert, no focal deficits    ED Course           No results found for this or any previous visit (from the past 24 hour(s)).    Medications   BUPivacaine 0.5 % EPINEPHrine 1:200,000 0.5% -[id number] dental injection SOLN 1.8 mL (has no administration in time range)       [facility]    Dental Block    Date/Time: 10/4/2023 6:01 AM    Performed by: Sterling Ramirez MD  Authorized by: Sterling Ramirez MD    Risks, benefits and alternatives discussed.      INDICATIONS     Indications: dental pain      LOCATION     Block type:  Inferior alveolar    Laterality:  Right    PROCEDURE DETAILS     Topical anesthetic:  Benzocaine gel    Needle gauge:  27 G    Anesthetic injected:  Bupivacaine 0.5% WITH epi    Injection procedure:   Anatomic landmarks identified and incremental injection    POST PROCEDURE DETAILS      Outcome:  Anesthesia achieved        Assessments & Plan (with Medical Decision Making)     I have reviewed the nursing notes.      Evaluated for dental pain. Differential includes dental caries, apical abscess, dental abscess, tooth fracture, peritonsillar abscess, gingivitis, pericoronitis, maxillary sinusitis. History and exam is most suggestive of dental caries or apical abscess. Patient did accept a dental block for pain control per above procedure note. Given the possibility of dental infection with improvement with cold, a trial of augmentin was prescribed and the patient is stable for discharge with alternating nsaid and tylenol pain control and close follow up with a dentist. Discharged home with instructions on diagnosis including ED return precautions.    I have reviewed the findings, diagnosis, plan and need for follow up with the patient.    New Prescriptions    AMOXICILLIN-CLAVULANATE (AUGMENTIN) 875-125 MG TABLET    Take 1 tablet by mouth 2 times daily for 7 days       Final diagnoses:   Pain, dental       10/4/2023   [facility] AND [facility]       Sterling Ramirez MD  10/04/23 0602

## 2023-10-04 NOTE — ED TRIAGE NOTES
Arrived from home via private vehicle.  CC of acute on chronic dental pain.  Has been having pain with right lower molar for some time now, has been flared up for the past few days.  Difficulty getting in to see dentist.  Mild facial swelling associated.      Triage Assessment       Row Name 10/04/23 0518       Triage Assessment (Adult)    Airway WDL WDL       Respiratory WDL    Respiratory WDL WDL       Skin Circulation/Temperature WDL    Skin Circulation/Temperature WDL WDL       Cardiac WDL    Cardiac WDL WDL       Peripheral/Neurovascular WDL    Peripheral Neurovascular WDL WDL       Cognitive/Neuro/Behavioral WDL    Cognitive/Neuro/Behavioral WDL WDL

## 2023-10-04 NOTE — DISCHARGE INSTRUCTIONS
For moderate to severe pain, alternate nsaids (e.g. Ibuprofen) every 6 hours and Tylenol every 6 hours. For example, first take Ibuprofen, then 3 hours later take Tylenol, then 3 hours later take Ibuprofen, then 3 hours later take Tylenol, and so forth. You can take up to 3200 mg of ibuprofen and 4000 mg of tylenol over a 24 hour period.     Please complete antibiotic prescription. Recommend daily yogurt or probiotic while taking antibiotics to avoid potential antibiotic side effects including diarrhea.     Recommend following up as soon as possible for dental care.     Return to emergency department if you develop issues with swallowing, breathing, opening/closing jaw, uncontrollable nausea/vomiting, or fever 100.4 F or higher.

## 2023-10-05 ENCOUNTER — ALLIED HEALTH/NURSE VISIT (OUTPATIENT)
Dept: FAMILY MEDICINE | Facility: OTHER | Age: 23
End: 2023-10-05
Attending: NURSE PRACTITIONER
Payer: COMMERCIAL

## 2023-10-05 DIAGNOSIS — Z23 NEED FOR PROPHYLACTIC VACCINATION AND INOCULATION AGAINST INFLUENZA: Primary | ICD-10-CM

## 2023-10-05 PROCEDURE — 90686 IIV4 VACC NO PRSV 0.5 ML IM: CPT

## 2023-11-14 DIAGNOSIS — E55.9 VITAMIN D INSUFFICIENCY: ICD-10-CM

## 2023-11-17 RX ORDER — CHOLECALCIFEROL (VITAMIN D3) 50 MCG
50 TABLET ORAL DAILY
Qty: 90 TABLET | Refills: 4 | Status: SHIPPED | OUTPATIENT
Start: 2023-11-17

## 2023-11-17 NOTE — TELEPHONE ENCOUNTER
[facility] sent Rx request for the following:      Requested Prescriptions   Pending Prescriptions Disp Refills    VITAMIN D3 50 MCG (2000 UT) tablet [Pharmacy Med Name: VITAMIN D3 2000IU TABLET] 90 tablet 4     Sig: TAKE 1 TABLET (50 MCG) BY MOUTH DAILY       Vitamin Supplements (Adult) Protocol Passed - 11/14/2023  1:00 AM     Last Prescription Date:   11/17/23  Last Fill Qty/Refills:         90, R-4    Last Office Visit:              4/27/23   Future Office visit:           none     Redundant refill request refused: Too soon:  Yuly Lopez RN on 11/17/2023 at 8:21 AM

## 2023-12-17 ENCOUNTER — HEALTH MAINTENANCE LETTER (OUTPATIENT)
Age: 23
End: 2023-12-17

## 2024-02-08 ENCOUNTER — HOSPITAL ENCOUNTER (OUTPATIENT)
Dept: GENERAL RADIOLOGY | Facility: OTHER | Age: 24
Discharge: HOME | End: 2024-02-08
Attending: STUDENT IN AN ORGANIZED HEALTH CARE EDUCATION/TRAINING PROGRAM
Payer: COMMERCIAL

## 2024-02-08 ENCOUNTER — OFFICE VISIT (OUTPATIENT)
Dept: FAMILY MEDICINE | Facility: OTHER | Age: 24
End: 2024-02-08
Payer: COMMERCIAL

## 2024-02-08 VITALS
DIASTOLIC BLOOD PRESSURE: 76 MMHG | OXYGEN SATURATION: 98 % | BODY MASS INDEX: 29.6 KG/M2 | SYSTOLIC BLOOD PRESSURE: 130 MMHG | RESPIRATION RATE: 16 BRPM | HEART RATE: 72 BPM | TEMPERATURE: 98 F | WEIGHT: 195.3 LBS | HEIGHT: 68 IN

## 2024-02-08 DIAGNOSIS — S69.91XA INJURY OF RIGHT WRIST, INITIAL ENCOUNTER: ICD-10-CM

## 2024-02-08 DIAGNOSIS — S69.91XA INJURY OF RIGHT WRIST, INITIAL ENCOUNTER: Primary | ICD-10-CM

## 2024-02-08 PROCEDURE — 73110 X-RAY EXAM OF WRIST: CPT | Mod: RT

## 2024-02-08 PROCEDURE — G0463 HOSPITAL OUTPT CLINIC VISIT: HCPCS

## 2024-02-08 PROCEDURE — 99213 OFFICE O/P EST LOW 20 MIN: CPT | Performed by: STUDENT IN AN ORGANIZED HEALTH CARE EDUCATION/TRAINING PROGRAM

## 2024-02-08 ASSESSMENT — PAIN SCALES - GENERAL: PAINLEVEL: SEVERE PAIN (7)

## 2024-02-08 NOTE — NURSING NOTE
"Chief Complaint   Patient presents with    Musculoskeletal Problem     Right wrist     Patient here for right wrist pain x1 day. Patient states he woke up this morning and it was hurting. He notes working out hard yesterday which may have caused it.     Initial /76   Pulse 72   Temp 98  F (36.7  C) (Tympanic)   Resp 16   Ht 1.715 m (5' 7.5\")   Wt 88.6 kg (195 lb 4.8 oz)   SpO2 98%   BMI 30.14 kg/m   Estimated body mass index is 30.14 kg/m  as calculated from the following:    Height as of this encounter: 1.715 m (5' 7.5\").    Weight as of this encounter: 88.6 kg (195 lb 4.8 oz).  Medication Review: complete    The next two questions are to help us understand your food security.  If you are feeling you need any assistance in this area, we have resources available to support you today.          2/8/2024   SDOH- Food Insecurity   Within the past 12 months, did you worry that your food would run out before you got money to buy more? N   Within the past 12 months, did the food you bought just not last and you didn t have money to get more? N         Health Care Directive:  Patient does not have a Health Care Directive or Living Will: Discussed advance care planning with patient; however, patient declined at this time.    Nicole Hamlin LPN      "

## 2024-02-09 NOTE — PROGRESS NOTES
"  Assessment & Plan     (S69.91XA) Injury of right wrist, initial encounter  (primary encounter diagnosis)    Comment: Soft tissue injury of the right wrist.  Most likely sprain or strain.  No evidence of fracture or dislocation on x-ray imaging.  CMS is intact.    Plan: XR Wrist Right G/E 3 Views, Wrist/Arm/Hand         Bracking Supplies Order Wrist Brace; Right;         non-thumb spica    Rest, ice, compression, and elevation.  Wrist brace was applied in the office.  Follow-up with orthopedics if not improving.  Return to [facility] or ER if symptoms worsen or change.  He is comfortable and agreeable with this plan.    Marisa Licea is a 23 year old, presenting for the following health issues:  Musculoskeletal Problem (Right wrist)    HPI     Patient presents today with concerns of right wrist injury.  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  He notes that there is not pain going into the hand nor up into the elbow area.  He has had good sensation.  He has been using ice.      Review of Systems  Constitutional, HEENT, cardiovascular, pulmonary, gi and gu systems are negative, except as otherwise noted.      Objective    /76   Pulse 72   Temp 98  F (36.7  C) (Tympanic)   Resp 16   Ht 1.715 m (5' 7.5\")   Wt 88.6 kg (195 lb 4.8 oz)   SpO2 98%   BMI 30.14 kg/m    Body mass index is 30.14 kg/m .    Physical Exam   GENERAL: alert and no distress  RESP: No increased work of breathing  MS: Slight edema over the right wrist, no erythema, ecchymosis, or gross abnormalities, decreased range of motion with flexion and extension due to pain, full range of motion of the fingers including , radial pulse 2+, capillary refill less than 3 seconds in all digits, light touch sensation is intact    Results for orders placed or performed during the hospital encounter of 02/08/24   XR Wrist Right G/E 3 Views     Status: None    " Narrative    PROCEDURE:  XR WRIST RIGHT G/E 3 VIEWS    HISTORY: pain in right wrist after injury; Injury of right wrist,  initial encounter    COMPARISON:  None.    TECHNIQUE:  3 views of the right wrist were obtained.    FINDINGS:  No fracture or dislocation is identified. The joint spaces  are preserved.        Impression    IMPRESSION: Normal right wrist      ANDREW DORSEY MD         SYSTEM ID:  [id number]         Signed Electronically by: Sharon Casarez PA-C

## 2024-03-13 DIAGNOSIS — I47.10 PAROXYSMAL SUPRAVENTRICULAR TACHYCARDIA (H): ICD-10-CM

## 2024-03-18 NOTE — TELEPHONE ENCOUNTER
[facility] sent Rx request for the following:      Requested Prescriptions   Pending Prescriptions Disp Refills    metoprolol succinate ER (TOPROL XL) 25 MG 24 hr tablet [Pharmacy Med Name: METOPROLOL SUCC 25MG ER TABLET] 90 tablet 3     Sig: TAKE 1 TABLET (25 MG) BY MOUTH EVERY EVENING       Beta-Blockers Protocol Failed - 3/13/2024  1:00 AM        Failed - Medication indicated for associated diagnosis     Medication is associated with one or more of the following diagnoses:     Hypertension (HTN)   Atrial fibrillation/flutter   Angina   ASCVD   Migraine   Heart Failure   Tremor   Anxiety   Ocular hypertension   Glaucoma       Last Prescription Date:   1/9/23  Last Fill Qty/Refills:         90, R-3    Last Office Visit:              4/27/23   Future Office visit:           none    Routing to PCP for refill consideration.  Tequila Miles RN on 3/18/2024 at 1:54 PM

## 2024-03-19 RX ORDER — METOPROLOL SUCCINATE 25 MG/1
25 TABLET, EXTENDED RELEASE ORAL EVERY EVENING
Qty: 90 TABLET | Refills: 0 | Status: SHIPPED | OUTPATIENT
Start: 2024-03-19

## 2024-05-12 ENCOUNTER — OFFICE VISIT (OUTPATIENT)
Dept: FAMILY MEDICINE | Facility: OTHER | Age: 24
End: 2024-05-12
Attending: STUDENT IN AN ORGANIZED HEALTH CARE EDUCATION/TRAINING PROGRAM
Payer: COMMERCIAL

## 2024-05-12 VITALS
OXYGEN SATURATION: 97 % | DIASTOLIC BLOOD PRESSURE: 74 MMHG | TEMPERATURE: 98.2 F | HEART RATE: 80 BPM | SYSTOLIC BLOOD PRESSURE: 130 MMHG | RESPIRATION RATE: 18 BRPM | HEIGHT: 68 IN | BODY MASS INDEX: 31.25 KG/M2 | WEIGHT: 206.2 LBS

## 2024-05-12 DIAGNOSIS — H61.23 BILATERAL IMPACTED CERUMEN: Primary | ICD-10-CM

## 2024-05-12 PROCEDURE — G0463 HOSPITAL OUTPT CLINIC VISIT: HCPCS | Mod: 25

## 2024-05-12 PROCEDURE — 99213 OFFICE O/P EST LOW 20 MIN: CPT

## 2024-05-12 PROCEDURE — G0463 HOSPITAL OUTPT CLINIC VISIT: HCPCS

## 2024-05-12 PROCEDURE — 69209 REMOVE IMPACTED EAR WAX UNI: CPT | Mod: 50

## 2024-05-12 PROCEDURE — 69210 REMOVE IMPACTED EAR WAX UNI: CPT

## 2024-05-12 ASSESSMENT — PAIN SCALES - GENERAL: PAINLEVEL: MILD PAIN (3)

## 2024-05-12 NOTE — PROGRESS NOTES
ASSESSMENT/PLAN:    I have reviewed the nursing notes.  I have reviewed the findings, diagnosis, plan and need for follow up with the patient.    1. Bilateral impacted cerumen  - REMOVE IMPACTED CERUMEN    Patient presents with right ear pain and muffled hearing.  Physical exam and symptoms consistent with bilateral impacted cerumen.  Cerumen was successfully removed via ear lavage by nursing staff.  Reexamined patient's ears after the cerumen was removed and reassured patient that there are no signs of infection in either ear.  May take Tylenol and ibuprofen as needed.  Discussed ways to treat cerumen buildup at home in the future.    Discussed warning signs/symptoms indicative of need to f/u    Follow up if symptoms persist or worsen or concerns    I explained my diagnostic considerations and recommendations to the patient, who voiced understanding and agreement with the treatment plan. All questions were answered. We discussed potential side effects of any prescribed or recommended therapies, as well as expectations for response to treatments.    ABI Wright CNP  5/12/2024  12:27 PM    HPI:    Vinayak Leavitt is a 23 year old male  who [facility] for concerns of ear pain    right ear pain x 3 days duration.     Presence of the following:   No fevers or chills.  No sore throat/pharyngitis/tonsillitis.   No allergy/URI Symptoms  Yes Muffled Sounds/Change in Hearing  Yes Sensation of Fullness in Ear(s)  No Ringing in Ears/Tinnitus  No Balance Changes  No Dizziness  No Headache   No Ear Drainage  Additional Symptoms: No  Denies persistent hearing loss, foul smelling odor from ear, changes in vision, nausea, vomiting, diarrhea, chest pain, shortness of breath.     No Recent swimming/hot tub  No submerging of head in shower/bathtub.     No Recent URI or other illness  History of otitis media: Yes  History of HEENT surgery (PE tubes, tonsillectomy/adenoidectomy, etc.): No  Recent  Course of ABX: No    Treatments Tried: none  Prior History of Similar Symptoms: Yes    PCP - Raquel Elliott NP    Past Medical History:   Diagnosis Date    Otitis media     6/6/09,left- treated with Zithromax    Streptococcal pharyngitis     No Comments Provided     Past Surgical History:   Procedure Laterality Date    OTHER SURGICAL HISTORY      [id number],OTHER,lacerated tendon    OTHER SURGICAL HISTORY      [id number],OTHER,Times two    TONSILLECTOMY      06/2017     Social History     Tobacco Use    Smoking status: Never    Smokeless tobacco: Never    Tobacco comments:     Quit smoking: mom smokes around him every once in awhile   Substance Use Topics    Alcohol use: No     Alcohol/week: 0.0 standard drinks of alcohol     Current Outpatient Medications   Medication Sig Dispense Refill    acetaminophen (TYLENOL) 500 MG tablet Take 1,000 mg by mouth every 6 hours as needed      chlorhexidine (PERIDEX) 0.12 % solution Swish and spit 15 mLs in mouth 2 times daily 473 mL 0    hydrOXYzine (ATARAX) 25 MG tablet Take 1 tablet (25 mg) by mouth 3 times daily as needed for anxiety 90 tablet 0    ibuprofen (ADVIL/MOTRIN) 600 MG tablet Take 1 tablet (600 mg) by mouth every 6 hours as needed for moderate pain 30 tablet 0    metoprolol succinate ER (TOPROL XL) 25 MG 24 hr tablet TAKE 1 TABLET (25 MG) BY MOUTH EVERY EVENING 90 tablet 0    polyethylene glycol (MIRALAX) 17 GM/Dose powder Take 17 g (1 capful) by mouth daily 507 g 1    SODIUM FLUORIDE 5000 PPM 1.1 % PSTE dental paste BRUSH WITH PEA SIZED AMOUNT IN THE MORNING AND AT NIGHT RIGHT BEFORE BED. EXPECTORATE EXCESS, DO NOT RINSE WITH WATER AFTER BRUSHING.      VITAMIN D3 50 MCG (2000 UT) tablet TAKE 1 TABLET (50 MCG) BY MOUTH DAILY 90 tablet 4    azithromycin (ZITHROMAX) 250 MG tablet Take 1 tablet (250 mg) by mouth daily (Patient not taking: Reported on 2/8/2024) 6 tablet 0     No Known Allergies  Past medical history, past surgical history, current medications and allergies  "reviewed and accurate to the best of my knowledge.      ROS:  Refer to HPI    /74   Pulse 80   Temp 98.2  F (36.8  C) (Tympanic)   Resp 18   Ht 1.727 m (5' 8\")   Wt 93.5 kg (206 lb 3.2 oz)   SpO2 97%   BMI 31.35 kg/m      EXAM:  General Appearance: Well appearing 23 year old male, appropriate appearance for age. No acute distress   Ears: Left TM intact, translucent with bony landmarks appreciated, no erythema, no effusion, no bulging, no purulence.  Right TM intact, translucent with bony landmarks appreciated, no erythema, no effusion, no bulging, no purulence.  Left auditory canal with cerumen impaction.  Right auditory canal with cerumen impaction.  Normal external ears, non tender.  Neuro: Alert and oriented to person, place, and time.    Psychological: normal affect, alert, oriented, and pleasant.       "

## 2024-05-12 NOTE — NURSING NOTE
"Chief Complaint   Patient presents with    Ear Problem     Right ear pain, hard to hear out of     Patient here for right ear pain and hard of hearing ongoing for a few months.       Initial /74   Pulse 80   Temp 98.2  F (36.8  C) (Tympanic)   Resp 18   Ht 1.727 m (5' 8\")   Wt 93.5 kg (206 lb 3.2 oz)   SpO2 97%   BMI 31.35 kg/m   Estimated body mass index is 31.35 kg/m  as calculated from the following:    Height as of this encounter: 1.727 m (5' 8\").    Weight as of this encounter: 93.5 kg (206 lb 3.2 oz).  Medication Review: complete    The next two questions are to help us understand your food security.  If you are feeling you need any assistance in this area, we have resources available to support you today.          5/12/2024   SDOH- Food Insecurity   Within the past 12 months, did you worry that your food would run out before you got money to buy more? N   Within the past 12 months, did the food you bought just not last and you didn t have money to get more? N         Health Care Directive:  Patient does not have a Health Care Directive or Living Will: Discussed advance care planning with patient; however, patient declined at this time.    Nicole Hamlin LPN      "

## 2024-09-08 ENCOUNTER — HOSPITAL ENCOUNTER (EMERGENCY)
Facility: OTHER | Age: 24
Discharge: HOME | End: 2024-09-08
Attending: FAMILY MEDICINE | Admitting: FAMILY MEDICINE
Payer: COMMERCIAL

## 2024-09-08 VITALS
HEIGHT: 68 IN | TEMPERATURE: 98.2 F | RESPIRATION RATE: 20 BRPM | SYSTOLIC BLOOD PRESSURE: 126 MMHG | WEIGHT: 206 LBS | BODY MASS INDEX: 31.22 KG/M2 | HEART RATE: 104 BPM | OXYGEN SATURATION: 100 % | DIASTOLIC BLOOD PRESSURE: 80 MMHG

## 2024-09-08 DIAGNOSIS — J06.9 UPPER RESPIRATORY TRACT INFECTION, UNSPECIFIED TYPE: ICD-10-CM

## 2024-09-08 LAB
FLUAV RNA SPEC QL NAA+PROBE: NEGATIVE
FLUBV RNA RESP QL NAA+PROBE: NEGATIVE
RSV RNA SPEC NAA+PROBE: NEGATIVE
SARS-COV-2 RNA RESP QL NAA+PROBE: NEGATIVE

## 2024-09-08 PROCEDURE — 99282 EMERGENCY DEPT VISIT SF MDM: CPT | Performed by: FAMILY MEDICINE

## 2024-09-08 PROCEDURE — 99283 EMERGENCY DEPT VISIT LOW MDM: CPT | Performed by: FAMILY MEDICINE

## 2024-09-08 PROCEDURE — 87637 SARSCOV2&INF A&B&RSV AMP PRB: CPT | Performed by: FAMILY MEDICINE

## 2024-09-08 ASSESSMENT — COLUMBIA-SUICIDE SEVERITY RATING SCALE - C-SSRS
1. IN THE PAST MONTH, HAVE YOU WISHED YOU WERE DEAD OR WISHED YOU COULD GO TO SLEEP AND NOT WAKE UP?: NO
6. HAVE YOU EVER DONE ANYTHING, STARTED TO DO ANYTHING, OR PREPARED TO DO ANYTHING TO END YOUR LIFE?: NO
2. HAVE YOU ACTUALLY HAD ANY THOUGHTS OF KILLING YOURSELF IN THE PAST MONTH?: NO

## 2024-09-08 ASSESSMENT — ACTIVITIES OF DAILY LIVING (ADL): ADLS_ACUITY_SCORE: 33

## 2024-09-08 NOTE — ED TRIAGE NOTES
"ED Nursing Triage Note (General)   ________________________________    Vinayak Leavitt is a 23 year old Male that presents to triage via private vehicle with complaints of cold symptoms that began this morning and have persisted since then.  Patient states sx include productive cough, chest discomfort, congestion, fatigue, and pharyngitis.  Patient denies known exposure.    Significant symptoms had onset 1 day(s) ago.  Vital signs:  Temp: 98.2  F (36.8  C) Temp src: Tympanic BP: 126/80 Pulse: 104   Resp: 20 SpO2: 100 %     Height: 172.7 cm (5' 8\") Weight: 93.4 kg (206 lb)  Estimated body mass index is 31.32 kg/m  as calculated from the following:    Height as of this encounter: 1.727 m (5' 8\").    Weight as of this encounter: 93.4 kg (206 lb).  PRE HOSPITAL PRIOR LIVING SITUATION Parents/Siblings      Triage Assessment (Adult)       Row Name 09/08/24 [id number]          Triage Assessment    Airway WDL WDL        Respiratory WDL    Respiratory WDL X;cough     Cough Frequency frequent     Cough Type assisted        Skin Circulation/Temperature WDL    Skin Circulation/Temperature WDL WDL        Cardiac WDL    Cardiac WDL WDL     Cardiac Rhythm NSR        Peripheral/Neurovascular WDL    Peripheral Neurovascular WDL WDL        Cognitive/Neuro/Behavioral WDL    Cognitive/Neuro/Behavioral WDL WDL                     "

## 2024-09-09 NOTE — ED PROVIDER NOTES
"[facility]  Emergency Department Visit Note    Cold Symptoms and Pharyngitis      History of Present Illness     HPI:  Vinayak Leavitt is a 23 year old male presenting with cough and congestion for 1 day.  He has had a little bit of a sore throat as well.  No trouble breathing or nausea vomiting or diarrhea.  No rash.  He has some fatigue.  No body aches or headache.  No known sick contacts.      Review of Systems:  10 point review of systems obtained and pertinent positive and negative findings noted in HPI. Review of systems otherwise negative.  Medications:  Reviewed in Epic Allergies:  Reviewed in Epic Problem List:  Reviewed in Epic   Past Medical History:  Reviewed in Epic Past Surgical History:  Reviewed in Epic Social History:  Reviewed in Epic       Physical Exam   Vital signs: /80   Pulse 104   Temp 98.2  F (36.8  C) (Tympanic)   Resp 20   Ht 1.727 m (5' 8\")   Wt 93.4 kg (206 lb)   SpO2 100%   BMI 31.32 kg/m    Physical Exam  Constitutional:       General: He is not in acute distress.     Appearance: Normal appearance. He is not toxic-appearing.   HENT:      Head: Atraumatic.      Right Ear: Tympanic membrane normal.      Left Ear: Tympanic membrane normal.      Nose: Congestion and rhinorrhea present.      Mouth/Throat:      Mouth: Mucous membranes are moist. No oral lesions.      Pharynx: Oropharynx is clear. Uvula midline. No oropharyngeal exudate or posterior oropharyngeal erythema.      Tonsils: No tonsillar exudate or tonsillar abscesses.   Eyes:      General: No scleral icterus.     Conjunctiva/sclera: Conjunctivae normal.   Cardiovascular:      Rate and Rhythm: Normal rate and regular rhythm.      Heart sounds: Normal heart sounds. No murmur heard.  Pulmonary:      Effort: Pulmonary effort is normal. No respiratory distress.      Breath sounds: Normal breath sounds.   Abdominal:      Palpations: Abdomen is soft.      Tenderness: There is no abdominal " tenderness.   Musculoskeletal:         General: No deformity.      Cervical back: Neck supple.   Lymphadenopathy:      Cervical: No cervical adenopathy.   Skin:     General: Skin is warm.   Neurological:      Mental Status: He is alert.           ED Course        Procedures                  No results found for this or any previous visit (from the past 24 hour(s)).    Medications - No data to display    Medical Decision Making     Vinayak Leavitt is a 23 year old male presenting with URI symptoms for 1 day.  Discussed natural course of illness.  He will follow-up his viral testing on his MyChart and if there is anything concerning we will give him a call.  I recommended rest and fluids.      Patient given instructions on follow-up and warning signs for which to return to ED. All questions were answered and the patient is comfortable with plan for discharge. The patient was discharged in stable condition or transferred in as stable condition as possible.    I have reviewed the patient's Medical Imaging and Medical Records.  I have reviewed the nursing notes.  I have reviewed the findings, diagnosis, plan and need for follow up with the patient.    Diagnosis & Disposition     Diagnosis:  1. Upper respiratory tract infection, unspecified type        Discharge disposition:  Discharged to home       Follow-up: Follow up with primary care provider in prn days       [facility] Emergency Department  Mary Larkin MD  Family Medicine     Mary Larkin MD  09/08/24 7938

## 2024-09-16 ENCOUNTER — ALLIED HEALTH/NURSE VISIT (OUTPATIENT)
Dept: FAMILY MEDICINE | Facility: OTHER | Age: 24
End: 2024-09-16
Payer: COMMERCIAL

## 2024-09-16 DIAGNOSIS — Z23 ENCOUNTER FOR IMMUNIZATION: Primary | ICD-10-CM

## 2024-09-16 PROCEDURE — 90656 IIV3 VACC NO PRSV 0.5 ML IM: CPT

## 2024-09-16 NOTE — PROGRESS NOTES
Prior to immunization administration, verified patients identity using patient s name and date of birth. Please see Immunization Activity for additional information.     Is the patient's temperature normal (100.5 or less)? Yes     Patient MEETS CRITERIA. PROCEED with vaccine administration.          9/16/2024   INFLUENZA   Would you like to receive the flu shot or the nasal flu vaccine today? Flu Shot   Have you had a serious reaction to a flu vaccine or something in a flu vaccine? No   Have you had Guillain-[place] syndrome within 6 weeks of getting a vaccine? No   Have you received a bone marrow transplant within the previous 6 months? No            Patient MEETS CRITERIA. PROCEED with vaccine administration.        Patient instructed to remain in clinic for 15 minutes afterwards, and to report any adverse reactions.      Link to Ancillary Visit Immunization Standing Orders SmartSet     Screening performed by Miguel Bazan RN on 9/16/2024 at 2:42 PM.

## 2024-10-08 ENCOUNTER — OFFICE VISIT (OUTPATIENT)
Dept: FAMILY MEDICINE | Facility: OTHER | Age: 24
End: 2024-10-08
Attending: NURSE PRACTITIONER
Payer: COMMERCIAL

## 2024-10-08 VITALS
HEIGHT: 67 IN | TEMPERATURE: 98.1 F | DIASTOLIC BLOOD PRESSURE: 82 MMHG | OXYGEN SATURATION: 99 % | WEIGHT: 211 LBS | RESPIRATION RATE: 16 BRPM | BODY MASS INDEX: 33.12 KG/M2 | SYSTOLIC BLOOD PRESSURE: 138 MMHG | HEART RATE: 92 BPM

## 2024-10-08 DIAGNOSIS — B37.89 CANDIDA RASH OF GROIN: Primary | ICD-10-CM

## 2024-10-08 DIAGNOSIS — D23.5: ICD-10-CM

## 2024-10-08 PROCEDURE — G0463 HOSPITAL OUTPT CLINIC VISIT: HCPCS

## 2024-10-08 PROCEDURE — 99213 OFFICE O/P EST LOW 20 MIN: CPT

## 2024-10-08 RX ORDER — KETOCONAZOLE 20 MG/G
CREAM TOPICAL
Qty: 60 G | Refills: 0 | Status: SHIPPED | OUTPATIENT
Start: 2024-10-08

## 2024-10-08 ASSESSMENT — LIFESTYLE VARIABLES
HOW OFTEN DO YOU HAVE A DRINK CONTAINING ALCOHOL: MONTHLY OR LESS
SKIP TO QUESTIONS 9-10: 0
HOW OFTEN DO YOU HAVE SIX OR MORE DRINKS ON ONE OCCASION: LESS THAN MONTHLY
HOW MANY STANDARD DRINKS CONTAINING ALCOHOL DO YOU HAVE ON A TYPICAL DAY: 1 OR 2
AUDIT-C TOTAL SCORE: 2

## 2024-10-08 ASSESSMENT — ENCOUNTER SYMPTOMS
EYES NEGATIVE: 1
CHILLS: 0
CARDIOVASCULAR NEGATIVE: 1
GASTROINTESTINAL NEGATIVE: 1
CONSTITUTIONAL NEGATIVE: 1
FEVER: 0
RESPIRATORY NEGATIVE: 1

## 2024-10-08 ASSESSMENT — PAIN SCALES - GENERAL: PAINLEVEL: MODERATE PAIN (4)

## 2024-10-08 NOTE — NURSING NOTE
"Chief Complaint   Patient presents with    Derm Problem     Pubic area - yeast- itching since sunday     Patient tx gold bond powder and showering more often.    Initial /82 (BP Location: Right arm, Patient Position: Sitting, Cuff Size: Adult Regular)   Pulse 92   Temp 98.1  F (36.7  C) (Tympanic)   Resp 16   Ht 1.702 m (5' 7\")   Wt 95.7 kg (211 lb)   SpO2 99%   BMI 33.05 kg/m   Estimated body mass index is 33.05 kg/m  as calculated from the following:    Height as of this encounter: 1.702 m (5' 7\").    Weight as of this encounter: 95.7 kg (211 lb).     Advance Care Directive on file? n    FOOD SECURITY SCREENING QUESTIONS:    The next two questions are to help us understand your food security.  If you are feeling you need any assistance in this area, we have resources available to support you today.    Hunger Vital Signs:  Within the past 12 months we worried whether our food would run out before we got money to buy more. Never  Within the past 12 months the food we bought just didn't last and we didn't have money to get more. Never  Deanna Menon LPN,AMARIS on 10/8/2024 at 11:36 AM      Deanna Menon LPN     "

## 2024-10-08 NOTE — PROGRESS NOTES
Vinayak Leavitt  2000    ASSESSMENT/PLAN      Presents to [facility] clinic by himself. Patient's vitals are stable and appears nontoxic.      1. Candida rash of groin  - ketoconazole (NIZORAL) 2 % external cream; Apply daily to affected areas of skin for the next 2 weeks.  Dispense: 60 g; Refill: 0  - Follow up as needed for new or worsening symptoms  2. Dermal nevus of buttock  - inflamed, irritated lesion (resembling nevus) to right upper buttock. Bleeds at time with wiping.   - Educated on proper hygiene, recommended wet wipes/bidet for improved hygiene.   - follow up with PCP for possible removal         *Explanation of diagnosis, treatment options and risk and benefits of medications reviewed with patient. Patient agrees with plan of care.  *All questions were answered.    *Red flags symptoms were discussed and patient was advised when they should return for reevaluation or for prompt emergency evaluation.   *Patient was given verbal and written instructions on plan of care. Instructions were printed or are available on [employer] on electronic AVS.   *We discussed potential side effects of any prescribed or recommended therapies, as well as expectations for response to treatments.  *Patient discharged in stable condition    Zac Alcantara, TAI, APRN, FNP-C  [facility]    SUBJECTIVE  CHIEF COMPLAINT/ REASON FOR VISIT  Patient presents with:  Derm Problem: Pubic area - yeast- itching since sunday     HISTORY OF PRESENT ILLNESS  Vinayak Leavitt is a pleasant 24 year old male [facility] with concerns of groin redness and itching. Patient has been using gold bond powder daily to area with no improvement. Patient unsure if this is an antifungal powder. Reports increased sweating due to working out and associated symptoms of itchiness, burning, and pain to groin specifically thigh creases. Patient uses dr. Rivera soap daily. Additionally, he has a concerning lesion to  "buttock.    History provided by patient.      I have reviewed the nursing notes.  I have reviewed allergies, medication list, problem list, and past medical history.    REVIEW OF SYSTEMS  Review of Systems   Constitutional: Negative.  Negative for chills and fever.   HENT: Negative.     Eyes: Negative.    Respiratory: Negative.     Cardiovascular: Negative.    Gastrointestinal: Negative.    Genitourinary:  Negative for penile discharge, penile pain, penile swelling, scrotal swelling and testicular pain.   Skin:  Positive for rash (Bilateral inner thigh crease).        VITAL SIGNS  Vitals:    10/08/24 1135   BP: 138/82   BP Location: Right arm   Patient Position: Sitting   Cuff Size: Adult Regular   Pulse: 92   Resp: 16   Temp: 98.1  F (36.7  C)   TempSrc: Tympanic   SpO2: 99%   Weight: 95.7 kg (211 lb)   Height: 1.702 m (5' 7\")      Body mass index is 33.05 kg/m .      OBJECTIVE  PHYSICAL EXAM  Physical Exam  Vitals and nursing note reviewed.   Constitutional:       General: He is not in acute distress.     Appearance: Normal appearance. He is normal weight. He is not ill-appearing, toxic-appearing or diaphoretic.   Genitourinary:     Penis: Normal.       Testes: Normal.      Rectum: Normal.   Skin:     General: Skin is warm.      Findings: Erythema (Bilateral inner thigh), lesion (R upper buttock) and rash (Bilateral inner thigh) present.   Neurological:      Mental Status: He is alert.            DIAGNOSTICS  No results found for any visits on 10/08/24.   "

## 2024-10-08 NOTE — PATIENT INSTRUCTIONS
Apply to affected areas of groin daily for the next 2 weeks. If no improvement by 1-2 weeks, follow up with [facility] or PCP.

## 2024-12-31 ENCOUNTER — OFFICE VISIT (OUTPATIENT)
Dept: FAMILY MEDICINE | Facility: OTHER | Age: 24
End: 2024-12-31
Attending: NURSE PRACTITIONER
Payer: COMMERCIAL

## 2024-12-31 VITALS
HEART RATE: 102 BPM | TEMPERATURE: 98.6 F | WEIGHT: 218.2 LBS | RESPIRATION RATE: 16 BRPM | BODY MASS INDEX: 34.17 KG/M2 | OXYGEN SATURATION: 99 % | SYSTOLIC BLOOD PRESSURE: 120 MMHG | DIASTOLIC BLOOD PRESSURE: 82 MMHG

## 2024-12-31 DIAGNOSIS — B37.89 CANDIDA RASH OF GROIN: ICD-10-CM

## 2024-12-31 DIAGNOSIS — L98.9 SKIN LESION: Primary | ICD-10-CM

## 2024-12-31 PROCEDURE — G0463 HOSPITAL OUTPT CLINIC VISIT: HCPCS

## 2024-12-31 RX ORDER — KETOCONAZOLE 20 MG/G
CREAM TOPICAL
Qty: 60 G | Refills: 11 | Status: SHIPPED | OUTPATIENT
Start: 2024-12-31

## 2024-12-31 ASSESSMENT — PAIN SCALES - GENERAL: PAINLEVEL_OUTOF10: NO PAIN (0)

## 2024-12-31 NOTE — NURSING NOTE
"Chief Complaint   Patient presents with    Follow Up     From  something under skin to be looked at       Initial /82   Pulse 102   Temp 98.6  F (37  C) (Tympanic)   Resp 16   Wt 99 kg (218 lb 3.2 oz)   SpO2 99%   BMI 34.17 kg/m   Estimated body mass index is 34.17 kg/m  as calculated from the following:    Height as of 10/8/24: 1.702 m (5' 7\").    Weight as of this encounter: 99 kg (218 lb 3.2 oz).  Medication Review: complete    The next two questions are to help us understand your food security.  If you are feeling you need any assistance in this area, we have resources available to support you today.          5/12/2024   SDOH- Food Insecurity   Within the past 12 months, did you worry that your food would run out before you got money to buy more? N   Within the past 12 months, did the food you bought just not last and you didn t have money to get more? N         Health Care Directive:  Patient does not have a Health Care Directive: Discussed advance care planning with patient; however, patient declined at this time.    Francia Gamble, AMARIS      "

## 2024-12-31 NOTE — PROGRESS NOTES
Assessment & Plan   Problem List Items Addressed This Visit    None  Visit Diagnoses       Skin lesion    -  Primary    Relevant Medications    ketoconazole (NIZORAL) 2 % external cream    Other Relevant Orders    Adult Gen Surg  Referral    Candida rash of groin        Relevant Medications    ketoconazole (NIZORAL) 2 % external cream           2 lesions on buttocks with discomfort and frequent drainage.  These have not been healing over the past several months.  Referral to general surgery for consideration of excision.  He does request a refill of yeast cream for Candida of groin, this was placed.     The longitudinal plan of care for the diagnosis(es)/condition(s) as documented were addressed during this visit. Due to the added complexity in care, I will continue to support Vinayak in the subsequent management and with ongoing continuity of care.  No follow-ups on file.      Marisa Licea is a 24 year old, presenting for the following health issues:  Follow Up (From  something under skin to be looked at)      12/31/2024     1:01 PM   Additional Questions   Roomed by Francia PARKER LPN     History of Present Illness       Reason for visit:  Lump I need checked   He is taking medications regularly.     He presents to clinic today for evaluation of lesions on his buttocks.  He reports at least 1 that is in the center of his buttocks.  He noticed this over the summer.  He finds it is uncomfortable when he sits on the toilet, occasionally will bleed.  He has been having some drainage.  He does not think is gotten any larger.  He has used Preparation H but is unclear if this is helpful.  He also has had recurrent genital yeast infection, has been using yeast cream for this.      Objective    /82   Pulse 102   Temp 98.6  F (37  C) (Tympanic)   Resp 16   Wt 99 kg (218 lb 3.2 oz)   SpO2 99%   BMI 34.17 kg/m    Body mass index is 34.17 kg/m .  Physical Exam   GENERAL: alert and no distress  SKIN:  Right upper buttocks and sacral area with 2 raised lesions measuring approximately 0.5 cm in diameter.  1 lesion has central opening, serous discharge noted.  NEURO: Normal strength and tone, mentation intact and speech normal  PSYCH: mentation appears normal, affect normal/bright            Signed Electronically by: ABI Jin CNP

## 2025-01-12 ENCOUNTER — HEALTH MAINTENANCE LETTER (OUTPATIENT)
Age: 25
End: 2025-01-12

## 2025-01-20 ENCOUNTER — OFFICE VISIT (OUTPATIENT)
Dept: SURGERY | Facility: OTHER | Age: 25
End: 2025-01-20
Attending: SURGERY
Payer: COMMERCIAL

## 2025-01-20 VITALS
RESPIRATION RATE: 16 BRPM | OXYGEN SATURATION: 99 % | HEART RATE: 97 BPM | SYSTOLIC BLOOD PRESSURE: 119 MMHG | WEIGHT: 219.6 LBS | HEIGHT: 67 IN | DIASTOLIC BLOOD PRESSURE: 79 MMHG | BODY MASS INDEX: 34.47 KG/M2 | TEMPERATURE: 98.2 F

## 2025-01-20 DIAGNOSIS — L05.91 PILONIDAL CYST: Primary | ICD-10-CM

## 2025-01-20 DIAGNOSIS — L98.9 SKIN LESION: ICD-10-CM

## 2025-01-20 PROCEDURE — 99203 OFFICE O/P NEW LOW 30 MIN: CPT | Performed by: SURGERY

## 2025-01-20 PROCEDURE — G0463 HOSPITAL OUTPT CLINIC VISIT: HCPCS

## 2025-01-20 RX ORDER — ACETAMINOPHEN 325 MG/1
975 TABLET ORAL ONCE
OUTPATIENT
Start: 2025-01-20 | End: 2025-01-20

## 2025-01-20 RX ORDER — CEFAZOLIN SODIUM 2 G/100ML
2 INJECTION, SOLUTION INTRAVENOUS
OUTPATIENT
Start: 2025-01-20

## 2025-01-20 RX ORDER — CEFAZOLIN SODIUM 2 G/100ML
2 INJECTION, SOLUTION INTRAVENOUS SEE ADMIN INSTRUCTIONS
OUTPATIENT
Start: 2025-01-20

## 2025-01-20 ASSESSMENT — PAIN SCALES - GENERAL: PAINLEVEL_OUTOF10: NO PAIN (0)

## 2025-01-20 NOTE — NURSING NOTE
"Chief Complaint   Patient presents with    Derm Problem       Initial /79 (BP Location: Right arm, Patient Position: Sitting, Cuff Size: Adult Large)   Pulse 97   Temp 98.2  F (36.8  C) (Tympanic)   Resp 16   Ht 1.702 m (5' 7\")   Wt 99.6 kg (219 lb 9.6 oz)   SpO2 99%   BMI 34.39 kg/m   Estimated body mass index is 34.39 kg/m  as calculated from the following:    Height as of this encounter: 1.702 m (5' 7\").    Weight as of this encounter: 99.6 kg (219 lb 9.6 oz).  Meds Reconciled: complete      FOOD SECURITY SCREENING QUESTIONS  Hunger Vital Signs:  Within the past 12 months we worried whether our food would run out before we got money to buy more. Never  Within the past 12 months the food we bought just didn't last and we didn't have money to get more. Never  Marycarmen Chatterjee RN 1/20/2025 2:17 PM        Marycarmen Chatterjee RN     "

## 2025-01-20 NOTE — H&P (VIEW-ONLY)
GENERAL SURGERY CONSULTATION NOTE    Vinayak Leavitt   [address]  [place] [address]  24 year old  male    Primary Care Provider:  Raquel Elliott      HPI: Vinayak Leavitt presents to clinic with draining buttocks lesion.  Patient notes pain, occasional bloody drainage from 2 areas on his buttocks.  He can also feel lesions back there.  Denies significant bleeding, denies swelling and pain.  Denies fevers and chills.  No previous incision and drainage of this area.      REVIEW OF SYSTEMS:    GENERAL: No fevers or chills. Denies fatigue, recent weight loss.  HEENT: No sinus drainage. No changes with vision or hearing. No difficulty swallowing.   LYMPHATICS:  Noswollen nodes in axilla, neck or groin.  CARDIOVASCULAR: Denies chest pain, palpitations and dyspnea on exertion.  PULMONARY: No shortness of breath or cough. No increase in sputum production.  GI: Denies melena,bright red blood in stools. No hematemesis. No constipation or diarrhea.  : No dysuria or hematuria.  SKIN: No recent rashes or ulcers.   HEMATOLOGY:  No history of easy bruising or bleeding.  ENDOCRINE:  No history of diabetes or thyroid problems.  NEUROLOGY:  No history of seizures or headaches. No motor or sensory changes.        Patient Active Problem List   Diagnosis    Acne    Active autistic disorder    Paroxysmal supraventricular tachycardia       Past Medical History:   Diagnosis Date    Otitis media     6/6/09,left- treated with Zithromax    Streptococcal pharyngitis     No Comments Provided       Past Surgical History:   Procedure Laterality Date    OTHER SURGICAL HISTORY      [id number],OTHER,lacerated tendon    OTHER SURGICAL HISTORY      [id number],OTHER,Times two    TONSILLECTOMY      06/2017       Family History   Problem Relation Age of Onset    Heart Disease Mother         Heart Disease,cardiac coronary anomaly    Thyroid nodules Mother     Other - See Comments Father         MVA    Attention Deficit Disorder Maternal Half-Sister          ADD / ADHD    Substance Abuse Maternal Half-Sister     Genetic Disorder No family hx of         Genetic,No family history of asthma, allergy or atopic dermatitis.       Social History     Social History Narrative    Lives with mother and grandmother.  Mother works at an adult foster care.       Social History     Socioeconomic History    Marital status: Single     Spouse name: Not on file    Number of children: Not on file    Years of education: Not on file    Highest education level: Not on file   Occupational History    Not on file   Tobacco Use    Smoking status: Never    Smokeless tobacco: Never    Tobacco comments:     Quit smoking: mom smokes around him every once in awhile   Vaping Use    Vaping status: Some Days    Substances: Flavoring   Substance and Sexual Activity    Alcohol use: No     Alcohol/week: 0.0 standard drinks of alcohol    Drug use: No    Sexual activity: Never   Other Topics Concern    Not on file   Social History Narrative    Lives with mother and grandmother.  Mother works at an adult foster care.     Social Drivers of Health     Financial Resource Strain: Not on file   Food Insecurity: Low Risk  (12/31/2024)    Food Insecurity     Within the past 12 months, did you worry that your food would run out before you got money to buy more?: No     Within the past 12 months, did the food you bought just not last and you didn t have money to get more?: No   Transportation Needs: Not on file   Physical Activity: Not on file   Stress: Not on file   Social Connections: Not on file   Interpersonal Safety: Low Risk  (1/20/2025)    Interpersonal Safety     Do you feel physically and emotionally safe where you currently live?: Yes     Within the past 12 months, have you been hit, slapped, kicked or otherwise physically hurt by someone?: No     Within the past 12 months, have you been humiliated or emotionally abused in other ways by your partner or ex-partner?: No   Housing Stability: Not on file  "      Current Outpatient Medications   Medication Sig Dispense Refill    acetaminophen (TYLENOL) 500 MG tablet Take 1,000 mg by mouth every 6 hours as needed      chlorhexidine (PERIDEX) 0.12 % solution Swish and spit 15 mLs in mouth 2 times daily 473 mL 0    hydrOXYzine (ATARAX) 25 MG tablet Take 1 tablet (25 mg) by mouth 3 times daily as needed for anxiety 90 tablet 0    ibuprofen (ADVIL/MOTRIN) 600 MG tablet Take 1 tablet (600 mg) by mouth every 6 hours as needed for moderate pain 30 tablet 0    ketoconazole (NIZORAL) 2 % external cream Apply daily to affected areas of skin for the next 2 weeks. 60 g 11    metoprolol succinate ER (TOPROL XL) 25 MG 24 hr tablet TAKE 1 TABLET (25 MG) BY MOUTH EVERY EVENING 90 tablet 0    polyethylene glycol (MIRALAX) 17 GM/Dose powder Take 17 g (1 capful) by mouth daily 507 g 1    SODIUM FLUORIDE 5000 PPM 1.1 % PSTE dental paste BRUSH WITH PEA SIZED AMOUNT IN THE MORNING AND AT NIGHT RIGHT BEFORE BED. EXPECTORATE EXCESS, DO NOT RINSE WITH WATER AFTER BRUSHING.      VITAMIN D3 50 MCG (2000 UT) tablet TAKE 1 TABLET (50 MCG) BY MOUTH DAILY 90 tablet 4     No current facility-administered medications for this visit.         ALLERGIES/SENSITIVITIES: No Known Allergies    PHYSICAL EXAM:     /79 (BP Location: Right arm, Patient Position: Sitting, Cuff Size: Adult Large)   Pulse 97   Temp 98.2  F (36.8  C) (Tympanic)   Resp 16   Ht 1.702 m (5' 7\")   Wt 99.6 kg (219 lb 9.6 oz)   SpO2 99%   BMI 34.39 kg/m      General Appearance:   Sitting up in the chair, no apparent distress  HEENT: Pupils are equal and reactive, no scleral icterus,   Heart & CV:  RRR no murmur.  Intact distal pulses, good cap refill.  LUNGS: No increased work of breathing.Lugns are CTA B/L, no wheezing or crackles.  Abd:  Soft, nontender, nondistended  Ext: Numerous pits at the  cleft with hair sticking out.  There are 2 open, punctate areas just superior to the  cleft and to the left with some " hypertrophic granulation tissue that appeared to be areas where the cyst is draining spontaneously.  The skin today is soft with no erythema or induration.  No expressible drainage.  No evidence of undrained fluid collection.    Neuro: Alert and oriented, normal speech and mentation        CONSULTATION ASSESSMENT AND PLAN:    24 year old male with pilonidal cyst.  No evidence of infection today.  Will plan for pilonidal cyst excision.  The technical details of this procedure were discussed with the patient along with the risks and benefits include bleeding, infection, wound complications, pilonidal cyst recurrence, injury to surrounding structures including surrounding subcutaneous tissues and muscle.  Patient demonstrates understanding is willing to proceed.      Schedule for pilonidal cyst excision on 2/12/2025      Tyree Gross MD on 1/20/2025 at 3:56 PM

## 2025-01-20 NOTE — PROGRESS NOTES
GENERAL SURGERY CONSULTATION NOTE    Vinayak Leavitt   [address]  [place] [address]  24 year old  male    Primary Care Provider:  Raquel Elliott      HPI: Vinayak Leavitt presents to clinic with draining buttocks lesion.  Patient notes pain, occasional bloody drainage from 2 areas on his buttocks.  He can also feel lesions back there.  Denies significant bleeding, denies swelling and pain.  Denies fevers and chills.  No previous incision and drainage of this area.      REVIEW OF SYSTEMS:    GENERAL: No fevers or chills. Denies fatigue, recent weight loss.  HEENT: No sinus drainage. No changes with vision or hearing. No difficulty swallowing.   LYMPHATICS:  Noswollen nodes in axilla, neck or groin.  CARDIOVASCULAR: Denies chest pain, palpitations and dyspnea on exertion.  PULMONARY: No shortness of breath or cough. No increase in sputum production.  GI: Denies melena,bright red blood in stools. No hematemesis. No constipation or diarrhea.  : No dysuria or hematuria.  SKIN: No recent rashes or ulcers.   HEMATOLOGY:  No history of easy bruising or bleeding.  ENDOCRINE:  No history of diabetes or thyroid problems.  NEUROLOGY:  No history of seizures or headaches. No motor or sensory changes.        Patient Active Problem List   Diagnosis    Acne    Active autistic disorder    Paroxysmal supraventricular tachycardia       Past Medical History:   Diagnosis Date    Otitis media     6/6/09,left- treated with Zithromax    Streptococcal pharyngitis     No Comments Provided       Past Surgical History:   Procedure Laterality Date    OTHER SURGICAL HISTORY      [id number],OTHER,lacerated tendon    OTHER SURGICAL HISTORY      [id number],OTHER,Times two    TONSILLECTOMY      06/2017       Family History   Problem Relation Age of Onset    Heart Disease Mother         Heart Disease,cardiac coronary anomaly    Thyroid nodules Mother     Other - See Comments Father         MVA    Attention Deficit Disorder Maternal Half-Sister          ADD / ADHD    Substance Abuse Maternal Half-Sister     Genetic Disorder No family hx of         Genetic,No family history of asthma, allergy or atopic dermatitis.       Social History     Social History Narrative    Lives with mother and grandmother.  Mother works at an adult foster care.       Social History     Socioeconomic History    Marital status: Single     Spouse name: Not on file    Number of children: Not on file    Years of education: Not on file    Highest education level: Not on file   Occupational History    Not on file   Tobacco Use    Smoking status: Never    Smokeless tobacco: Never    Tobacco comments:     Quit smoking: mom smokes around him every once in awhile   Vaping Use    Vaping status: Some Days    Substances: Flavoring   Substance and Sexual Activity    Alcohol use: No     Alcohol/week: 0.0 standard drinks of alcohol    Drug use: No    Sexual activity: Never   Other Topics Concern    Not on file   Social History Narrative    Lives with mother and grandmother.  Mother works at an adult foster care.     Social Drivers of Health     Financial Resource Strain: Not on file   Food Insecurity: Low Risk  (12/31/2024)    Food Insecurity     Within the past 12 months, did you worry that your food would run out before you got money to buy more?: No     Within the past 12 months, did the food you bought just not last and you didn t have money to get more?: No   Transportation Needs: Not on file   Physical Activity: Not on file   Stress: Not on file   Social Connections: Not on file   Interpersonal Safety: Low Risk  (1/20/2025)    Interpersonal Safety     Do you feel physically and emotionally safe where you currently live?: Yes     Within the past 12 months, have you been hit, slapped, kicked or otherwise physically hurt by someone?: No     Within the past 12 months, have you been humiliated or emotionally abused in other ways by your partner or ex-partner?: No   Housing Stability: Not on file  "      Current Outpatient Medications   Medication Sig Dispense Refill    acetaminophen (TYLENOL) 500 MG tablet Take 1,000 mg by mouth every 6 hours as needed      chlorhexidine (PERIDEX) 0.12 % solution Swish and spit 15 mLs in mouth 2 times daily 473 mL 0    hydrOXYzine (ATARAX) 25 MG tablet Take 1 tablet (25 mg) by mouth 3 times daily as needed for anxiety 90 tablet 0    ibuprofen (ADVIL/MOTRIN) 600 MG tablet Take 1 tablet (600 mg) by mouth every 6 hours as needed for moderate pain 30 tablet 0    ketoconazole (NIZORAL) 2 % external cream Apply daily to affected areas of skin for the next 2 weeks. 60 g 11    metoprolol succinate ER (TOPROL XL) 25 MG 24 hr tablet TAKE 1 TABLET (25 MG) BY MOUTH EVERY EVENING 90 tablet 0    polyethylene glycol (MIRALAX) 17 GM/Dose powder Take 17 g (1 capful) by mouth daily 507 g 1    SODIUM FLUORIDE 5000 PPM 1.1 % PSTE dental paste BRUSH WITH PEA SIZED AMOUNT IN THE MORNING AND AT NIGHT RIGHT BEFORE BED. EXPECTORATE EXCESS, DO NOT RINSE WITH WATER AFTER BRUSHING.      VITAMIN D3 50 MCG (2000 UT) tablet TAKE 1 TABLET (50 MCG) BY MOUTH DAILY 90 tablet 4     No current facility-administered medications for this visit.         ALLERGIES/SENSITIVITIES: No Known Allergies    PHYSICAL EXAM:     /79 (BP Location: Right arm, Patient Position: Sitting, Cuff Size: Adult Large)   Pulse 97   Temp 98.2  F (36.8  C) (Tympanic)   Resp 16   Ht 1.702 m (5' 7\")   Wt 99.6 kg (219 lb 9.6 oz)   SpO2 99%   BMI 34.39 kg/m      General Appearance:   Sitting up in the chair, no apparent distress  HEENT: Pupils are equal and reactive, no scleral icterus,   Heart & CV:  RRR no murmur.  Intact distal pulses, good cap refill.  LUNGS: No increased work of breathing.Lugns are CTA B/L, no wheezing or crackles.  Abd:  Soft, nontender, nondistended  Ext: Numerous pits at the  cleft with hair sticking out.  There are 2 open, punctate areas just superior to the  cleft and to the left with some " hypertrophic granulation tissue that appeared to be areas where the cyst is draining spontaneously.  The skin today is soft with no erythema or induration.  No expressible drainage.  No evidence of undrained fluid collection.    Neuro: Alert and oriented, normal speech and mentation        CONSULTATION ASSESSMENT AND PLAN:    24 year old male with pilonidal cyst.  No evidence of infection today.  Will plan for pilonidal cyst excision.  The technical details of this procedure were discussed with the patient along with the risks and benefits include bleeding, infection, wound complications, pilonidal cyst recurrence, injury to surrounding structures including surrounding subcutaneous tissues and muscle.  Patient demonstrates understanding is willing to proceed.      Schedule for pilonidal cyst excision on 2/12/2025      Tyree Gross MD on 1/20/2025 at 3:56 PM

## 2025-02-11 ENCOUNTER — ANESTHESIA EVENT (OUTPATIENT)
Dept: SURGERY | Facility: OTHER | Age: 25
End: 2025-02-11
Payer: COMMERCIAL

## 2025-02-12 ENCOUNTER — HOSPITAL ENCOUNTER (OUTPATIENT)
Facility: OTHER | Age: 25
Discharge: HOME | End: 2025-02-12
Attending: SURGERY | Admitting: SURGERY
Payer: COMMERCIAL

## 2025-02-12 ENCOUNTER — ANESTHESIA (OUTPATIENT)
Dept: SURGERY | Facility: OTHER | Age: 25
End: 2025-02-12
Payer: COMMERCIAL

## 2025-02-12 VITALS
SYSTOLIC BLOOD PRESSURE: 109 MMHG | BODY MASS INDEX: 34.3 KG/M2 | HEART RATE: 95 BPM | WEIGHT: 219 LBS | OXYGEN SATURATION: 100 % | RESPIRATION RATE: 16 BRPM | DIASTOLIC BLOOD PRESSURE: 59 MMHG | TEMPERATURE: 96.8 F

## 2025-02-12 DIAGNOSIS — Z98.890 POSTOPERATIVE STATE: Primary | ICD-10-CM

## 2025-02-12 DIAGNOSIS — L05.91 PILONIDAL CYST: ICD-10-CM

## 2025-02-12 PROCEDURE — 710N000012 HC RECOVERY PHASE 2, PER MINUTE: Performed by: SURGERY

## 2025-02-12 PROCEDURE — 258N000003 HC RX IP 258 OP 636: Performed by: NURSE ANESTHETIST, CERTIFIED REGISTERED

## 2025-02-12 PROCEDURE — 360N000075 HC SURGERY LEVEL 2, PER MIN: Performed by: SURGERY

## 2025-02-12 PROCEDURE — 250N000011 HC RX IP 250 OP 636: Performed by: NURSE ANESTHETIST, CERTIFIED REGISTERED

## 2025-02-12 PROCEDURE — 11772 EXC PILONIDAL CYST COMP: CPT | Performed by: SURGERY

## 2025-02-12 PROCEDURE — 272N000001 HC OR GENERAL SUPPLY STERILE: Performed by: SURGERY

## 2025-02-12 PROCEDURE — 250N000011 HC RX IP 250 OP 636: Performed by: SURGERY

## 2025-02-12 PROCEDURE — 250N000013 HC RX MED GY IP 250 OP 250 PS 637: Performed by: NURSE ANESTHETIST, CERTIFIED REGISTERED

## 2025-02-12 PROCEDURE — 250N000025 HC SEVOFLURANE, PER MIN: Performed by: SURGERY

## 2025-02-12 PROCEDURE — 999N000141 HC STATISTIC PRE-PROCEDURE NURSING ASSESSMENT: Performed by: SURGERY

## 2025-02-12 PROCEDURE — 258N000003 HC RX IP 258 OP 636

## 2025-02-12 PROCEDURE — 370N000017 HC ANESTHESIA TECHNICAL FEE, PER MIN: Performed by: SURGERY

## 2025-02-12 PROCEDURE — 710N000010 HC RECOVERY PHASE 1, LEVEL 2, PER MIN: Performed by: SURGERY

## 2025-02-12 PROCEDURE — 250N000011 HC RX IP 250 OP 636

## 2025-02-12 PROCEDURE — 250N000009 HC RX 250

## 2025-02-12 PROCEDURE — 250N000009 HC RX 250: Performed by: SURGERY

## 2025-02-12 PROCEDURE — 250N000013 HC RX MED GY IP 250 OP 250 PS 637: Performed by: SURGERY

## 2025-02-12 RX ORDER — CEFAZOLIN SODIUM/WATER 2 G/20 ML
2 SYRINGE (ML) INTRAVENOUS
Status: COMPLETED | OUTPATIENT
Start: 2025-02-12 | End: 2025-02-12

## 2025-02-12 RX ORDER — ONDANSETRON 2 MG/ML
INJECTION INTRAMUSCULAR; INTRAVENOUS PRN
Status: DISCONTINUED | OUTPATIENT
Start: 2025-02-12 | End: 2025-02-12

## 2025-02-12 RX ORDER — NALOXONE HYDROCHLORIDE 0.4 MG/ML
0.1 INJECTION, SOLUTION INTRAMUSCULAR; INTRAVENOUS; SUBCUTANEOUS
Status: DISCONTINUED | OUTPATIENT
Start: 2025-02-12 | End: 2025-02-12 | Stop reason: HOSPADM

## 2025-02-12 RX ORDER — FENTANYL CITRATE 50 UG/ML
INJECTION, SOLUTION INTRAMUSCULAR; INTRAVENOUS PRN
Status: DISCONTINUED | OUTPATIENT
Start: 2025-02-12 | End: 2025-02-12

## 2025-02-12 RX ORDER — ACETAMINOPHEN 325 MG/1
650 TABLET ORAL
Status: DISCONTINUED | OUTPATIENT
Start: 2025-02-12 | End: 2025-02-12 | Stop reason: HOSPADM

## 2025-02-12 RX ORDER — GLYCOPYRROLATE 0.2 MG/ML
INJECTION, SOLUTION INTRAMUSCULAR; INTRAVENOUS PRN
Status: DISCONTINUED | OUTPATIENT
Start: 2025-02-12 | End: 2025-02-12

## 2025-02-12 RX ORDER — OXYCODONE HYDROCHLORIDE 5 MG/1
10 TABLET ORAL
Status: DISCONTINUED | OUTPATIENT
Start: 2025-02-12 | End: 2025-02-12 | Stop reason: HOSPADM

## 2025-02-12 RX ORDER — FENTANYL CITRATE 50 UG/ML
25 INJECTION, SOLUTION INTRAMUSCULAR; INTRAVENOUS EVERY 5 MIN PRN
Status: DISCONTINUED | OUTPATIENT
Start: 2025-02-12 | End: 2025-02-12 | Stop reason: HOSPADM

## 2025-02-12 RX ORDER — MAGNESIUM HYDROXIDE 1200 MG/15ML
LIQUID ORAL PRN
Status: DISCONTINUED | OUTPATIENT
Start: 2025-02-12 | End: 2025-02-12 | Stop reason: HOSPADM

## 2025-02-12 RX ORDER — KETOROLAC TROMETHAMINE 30 MG/ML
INJECTION, SOLUTION INTRAMUSCULAR; INTRAVENOUS PRN
Status: DISCONTINUED | OUTPATIENT
Start: 2025-02-12 | End: 2025-02-12

## 2025-02-12 RX ORDER — DEXAMETHASONE SODIUM PHOSPHATE 4 MG/ML
INJECTION, SOLUTION INTRA-ARTICULAR; INTRALESIONAL; INTRAMUSCULAR; INTRAVENOUS; SOFT TISSUE PRN
Status: DISCONTINUED | OUTPATIENT
Start: 2025-02-12 | End: 2025-02-12

## 2025-02-12 RX ORDER — HYDROMORPHONE HCL IN WATER/PF 6 MG/30 ML
0.2 PATIENT CONTROLLED ANALGESIA SYRINGE INTRAVENOUS EVERY 5 MIN PRN
Status: DISCONTINUED | OUTPATIENT
Start: 2025-02-12 | End: 2025-02-12 | Stop reason: HOSPADM

## 2025-02-12 RX ORDER — KETAMINE HYDROCHLORIDE 10 MG/ML
INJECTION INTRAMUSCULAR; INTRAVENOUS PRN
Status: DISCONTINUED | OUTPATIENT
Start: 2025-02-12 | End: 2025-02-12

## 2025-02-12 RX ORDER — OXYCODONE HYDROCHLORIDE 5 MG/1
5 TABLET ORAL
Status: DISCONTINUED | OUTPATIENT
Start: 2025-02-12 | End: 2025-02-12 | Stop reason: HOSPADM

## 2025-02-12 RX ORDER — ONDANSETRON 2 MG/ML
4 INJECTION INTRAMUSCULAR; INTRAVENOUS EVERY 30 MIN PRN
Status: DISCONTINUED | OUTPATIENT
Start: 2025-02-12 | End: 2025-02-12 | Stop reason: HOSPADM

## 2025-02-12 RX ORDER — OXYCODONE HYDROCHLORIDE 5 MG/1
5 TABLET ORAL
Status: COMPLETED | OUTPATIENT
Start: 2025-02-12 | End: 2025-02-12

## 2025-02-12 RX ORDER — FENTANYL CITRATE 50 UG/ML
50 INJECTION, SOLUTION INTRAMUSCULAR; INTRAVENOUS EVERY 5 MIN PRN
Status: DISCONTINUED | OUTPATIENT
Start: 2025-02-12 | End: 2025-02-12 | Stop reason: HOSPADM

## 2025-02-12 RX ORDER — SODIUM CHLORIDE, SODIUM LACTATE, POTASSIUM CHLORIDE, CALCIUM CHLORIDE 600; 310; 30; 20 MG/100ML; MG/100ML; MG/100ML; MG/100ML
INJECTION, SOLUTION INTRAVENOUS CONTINUOUS
Status: DISCONTINUED | OUTPATIENT
Start: 2025-02-12 | End: 2025-02-12 | Stop reason: HOSPADM

## 2025-02-12 RX ORDER — PROPOFOL 10 MG/ML
INJECTION, EMULSION INTRAVENOUS PRN
Status: DISCONTINUED | OUTPATIENT
Start: 2025-02-12 | End: 2025-02-12

## 2025-02-12 RX ORDER — ACETAMINOPHEN 325 MG/1
975 TABLET ORAL ONCE
Status: COMPLETED | OUTPATIENT
Start: 2025-02-12 | End: 2025-02-12

## 2025-02-12 RX ORDER — DEXAMETHASONE SODIUM PHOSPHATE 10 MG/ML
4 INJECTION, SOLUTION INTRAMUSCULAR; INTRAVENOUS
Status: DISCONTINUED | OUTPATIENT
Start: 2025-02-12 | End: 2025-02-12 | Stop reason: HOSPADM

## 2025-02-12 RX ORDER — OXYCODONE AND ACETAMINOPHEN 5; 325 MG/1; MG/1
1 TABLET ORAL EVERY 6 HOURS PRN
Qty: 12 TABLET | Refills: 0 | Status: SHIPPED | OUTPATIENT
Start: 2025-02-12 | End: 2025-02-15

## 2025-02-12 RX ORDER — ONDANSETRON 4 MG/1
4 TABLET, ORALLY DISINTEGRATING ORAL EVERY 30 MIN PRN
Status: DISCONTINUED | OUTPATIENT
Start: 2025-02-12 | End: 2025-02-12 | Stop reason: HOSPADM

## 2025-02-12 RX ORDER — LIDOCAINE 40 MG/G
CREAM TOPICAL
Status: DISCONTINUED | OUTPATIENT
Start: 2025-02-12 | End: 2025-02-12 | Stop reason: HOSPADM

## 2025-02-12 RX ORDER — CEFAZOLIN SODIUM/WATER 2 G/20 ML
2 SYRINGE (ML) INTRAVENOUS SEE ADMIN INSTRUCTIONS
Status: DISCONTINUED | OUTPATIENT
Start: 2025-02-12 | End: 2025-02-12 | Stop reason: HOSPADM

## 2025-02-12 RX ORDER — HYDROMORPHONE HCL IN WATER/PF 6 MG/30 ML
0.4 PATIENT CONTROLLED ANALGESIA SYRINGE INTRAVENOUS EVERY 5 MIN PRN
Status: DISCONTINUED | OUTPATIENT
Start: 2025-02-12 | End: 2025-02-12 | Stop reason: HOSPADM

## 2025-02-12 RX ORDER — ONDANSETRON 4 MG/1
4 TABLET, ORALLY DISINTEGRATING ORAL
Status: DISCONTINUED | OUTPATIENT
Start: 2025-02-12 | End: 2025-02-12 | Stop reason: HOSPADM

## 2025-02-12 RX ORDER — SENNA AND DOCUSATE SODIUM 50; 8.6 MG/1; MG/1
1 TABLET, FILM COATED ORAL PRN
Qty: 20 TABLET | Refills: 0 | Status: SHIPPED | OUTPATIENT
Start: 2025-02-12

## 2025-02-12 RX ORDER — FENTANYL CITRATE 50 UG/ML
25 INJECTION, SOLUTION INTRAMUSCULAR; INTRAVENOUS
Status: DISCONTINUED | OUTPATIENT
Start: 2025-02-12 | End: 2025-02-12 | Stop reason: HOSPADM

## 2025-02-12 RX ORDER — LIDOCAINE HYDROCHLORIDE 20 MG/ML
INJECTION, SOLUTION INFILTRATION; PERINEURAL PRN
Status: DISCONTINUED | OUTPATIENT
Start: 2025-02-12 | End: 2025-02-12

## 2025-02-12 RX ORDER — BUPIVACAINE HYDROCHLORIDE AND EPINEPHRINE 5; 5 MG/ML; UG/ML
INJECTION, SOLUTION EPIDURAL; INTRACAUDAL; PERINEURAL PRN
Status: DISCONTINUED | OUTPATIENT
Start: 2025-02-12 | End: 2025-02-12 | Stop reason: HOSPADM

## 2025-02-12 RX ADMIN — GLYCOPYRROLATE 0.1 MG: 0.2 INJECTION, SOLUTION INTRAMUSCULAR; INTRAVENOUS at 10:00

## 2025-02-12 RX ADMIN — ONDANSETRON HYDROCHLORIDE 4 MG: 2 SOLUTION INTRAMUSCULAR; INTRAVENOUS at 09:24

## 2025-02-12 RX ADMIN — PHENYLEPHRINE HYDROCHLORIDE 100 MCG: 10 INJECTION INTRAVENOUS at 10:17

## 2025-02-12 RX ADMIN — GLYCOPYRROLATE 0.2 MG: 0.2 INJECTION, SOLUTION INTRAMUSCULAR; INTRAVENOUS at 09:10

## 2025-02-12 RX ADMIN — SODIUM CHLORIDE, POTASSIUM CHLORIDE, SODIUM LACTATE AND CALCIUM CHLORIDE: 600; 310; 30; 20 INJECTION, SOLUTION INTRAVENOUS at 10:01

## 2025-02-12 RX ADMIN — FENTANYL CITRATE 25 MCG: 50 INJECTION INTRAMUSCULAR; INTRAVENOUS at 12:08

## 2025-02-12 RX ADMIN — Medication 10 MG: at 09:32

## 2025-02-12 RX ADMIN — KETOROLAC TROMETHAMINE 15 MG: 30 INJECTION, SOLUTION INTRAMUSCULAR at 10:10

## 2025-02-12 RX ADMIN — PROPOFOL 200 MG: 10 INJECTION, EMULSION INTRAVENOUS at 09:10

## 2025-02-12 RX ADMIN — Medication 20 MG: at 09:10

## 2025-02-12 RX ADMIN — ROCURONIUM BROMIDE 50 MG: 50 INJECTION, SOLUTION INTRAVENOUS at 09:10

## 2025-02-12 RX ADMIN — MIDAZOLAM 2 MG: 1 INJECTION INTRAMUSCULAR; INTRAVENOUS at 08:42

## 2025-02-12 RX ADMIN — MIDAZOLAM HYDROCHLORIDE 2 MG: 1 INJECTION, SOLUTION INTRAMUSCULAR; INTRAVENOUS at 09:06

## 2025-02-12 RX ADMIN — FENTANYL CITRATE 25 MCG: 50 INJECTION INTRAMUSCULAR; INTRAVENOUS at 10:24

## 2025-02-12 RX ADMIN — PHENYLEPHRINE HYDROCHLORIDE 100 MCG: 10 INJECTION INTRAVENOUS at 09:59

## 2025-02-12 RX ADMIN — CEFAZOLIN 2 G: 10 INJECTION, POWDER, FOR SOLUTION INTRAVENOUS at 09:03

## 2025-02-12 RX ADMIN — PHENYLEPHRINE HYDROCHLORIDE 100 MCG: 10 INJECTION INTRAVENOUS at 09:53

## 2025-02-12 RX ADMIN — FENTANYL CITRATE 25 MCG: 50 INJECTION INTRAMUSCULAR; INTRAVENOUS at 09:32

## 2025-02-12 RX ADMIN — FENTANYL CITRATE 25 MCG: 50 INJECTION INTRAMUSCULAR; INTRAVENOUS at 11:39

## 2025-02-12 RX ADMIN — SODIUM CHLORIDE, POTASSIUM CHLORIDE, SODIUM LACTATE AND CALCIUM CHLORIDE: 600; 310; 30; 20 INJECTION, SOLUTION INTRAVENOUS at 08:24

## 2025-02-12 RX ADMIN — DEXMEDETOMIDINE HYDROCHLORIDE 8 MCG: 100 INJECTION, SOLUTION INTRAVENOUS at 09:32

## 2025-02-12 RX ADMIN — ACETAMINOPHEN 975 MG: 325 TABLET, FILM COATED ORAL at 08:01

## 2025-02-12 RX ADMIN — ONDANSETRON 4 MG: 2 INJECTION INTRAMUSCULAR; INTRAVENOUS at 12:06

## 2025-02-12 RX ADMIN — OXYCODONE HYDROCHLORIDE 5 MG: 5 TABLET ORAL at 11:34

## 2025-02-12 RX ADMIN — FENTANYL CITRATE 50 MCG: 50 INJECTION INTRAMUSCULAR; INTRAVENOUS at 09:10

## 2025-02-12 RX ADMIN — PHENYLEPHRINE HYDROCHLORIDE 100 MCG: 10 INJECTION INTRAVENOUS at 10:09

## 2025-02-12 RX ADMIN — LIDOCAINE HYDROCHLORIDE 60 MG: 20 INJECTION, SOLUTION INFILTRATION; PERINEURAL at 09:10

## 2025-02-12 RX ADMIN — SUGAMMADEX 200 MG: 100 INJECTION, SOLUTION INTRAVENOUS at 10:22

## 2025-02-12 RX ADMIN — DEXAMETHASONE SODIUM PHOSPHATE 4 MG: 4 INJECTION, SOLUTION INTRA-ARTICULAR; INTRALESIONAL; INTRAMUSCULAR; INTRAVENOUS; SOFT TISSUE at 09:24

## 2025-02-12 ASSESSMENT — ACTIVITIES OF DAILY LIVING (ADL)
ADLS_ACUITY_SCORE: 35

## 2025-02-12 ASSESSMENT — LIFESTYLE VARIABLES: TOBACCO_USE: 1

## 2025-02-12 NOTE — ANESTHESIA CARE TRANSFER NOTE
Patient: Vinayak Leavitt    Procedure: Procedure(s):  EXCISION, PILONIDAL CYST, pilonidal cyst excision       Diagnosis: Pilonidal cyst [L05.91]  Diagnosis Additional Information: No value filed.    Anesthesia Type:   General     Note:    Oropharynx: spontaneously breathing and oropharynx clear of all foreign objects  Level of Consciousness: awake and drowsy  Oxygen Supplementation: face mask  Level of Supplemental Oxygen (L/min / FiO2): 5  Independent Airway: airway patency satisfactory and stable  Dentition: dentition unchanged  Vital Signs Stable: post-procedure vital signs reviewed and stable  Report to RN Given: handoff report given  Patient transferred to: PACU    Handoff Report: Identifed the Patient, Identified the Reponsible Provider, Reviewed the pertinent medical history, Discussed the surgical course, Reviewed Intra-OP anesthesia mangement and issues during anesthesia, Set expectations for post-procedure period and Allowed opportunity for questions and acknowledgement of understanding    Vitals:  Vitals Value Taken Time   /78 02/12/25 1036   Temp     Pulse 114 02/12/25 1040   Resp 18 02/12/25 1040   SpO2 89 % 02/12/25 1040   Vitals shown include unfiled device data.    Electronically Signed By: ABI Snell CRNA  February 12, 2025  10:41 AM

## 2025-02-12 NOTE — OR NURSING
Vinayak has been discharged to home at 1350 via wheelchair accompanied by Yanna, mother and TRESSA Gonzales.    Written discharge instructions were provided to Vinayak and Yanna.  Prescriptions were escribed to [facility].  Patient states their pain is 4/10 which is tolerable, and denies any nausea or dizziness upon discharge.    Patient and adult caring for them verbalize understanding of discharge instructions including no driving until tomorrow and no longer taking narcotic pain medications - no operating mechanical equipment and no making any important decisions.They understand reason for discharge, and necessary follow-up appointments.

## 2025-02-12 NOTE — ANESTHESIA PREPROCEDURE EVALUATION
Anesthesia Pre-Procedure Evaluation    Patient: Vinayak Leavitt   MRN: [id number] : 2000        Procedure : Procedure(s):  EXCISION, PILONIDAL CYST, pilonidal cyst excision          Past Medical History:   Diagnosis Date    Otitis media     09,left- treated with Zithromax    Streptococcal pharyngitis     No Comments Provided      Past Surgical History:   Procedure Laterality Date    OTHER SURGICAL HISTORY      127215,OTHER,lacerated tendon    OTHER SURGICAL HISTORY      229434,OTHER,Times two    TONSILLECTOMY      2017      No Known Allergies   Social History     Tobacco Use    Smoking status: Never    Smokeless tobacco: Never    Tobacco comments:     Quit smoking: mom smokes around him every once in awhile   Substance Use Topics    Alcohol use: No     Alcohol/week: 0.0 standard drinks of alcohol      Wt Readings from Last 1 Encounters:   25 99.6 kg (219 lb 9.6 oz)        Anesthesia Evaluation   Pt has had prior anesthetic. Type: General.    No history of anesthetic complications       ROS/MED HX  ENT/Pulmonary: Comment: Patient vapes, currently uses no nicotine vapes  Patient last vaped nicotine 2 weeks ago    Patient previously used THC but hasn't used in months    (+)                tobacco use, Past use,                       Neurologic:       Cardiovascular: Comment: Hx PSVT    (+)  - -   -  - -                                 Previous cardiac testing   Echo: Date: Results:    Stress Test:  Date: Results:    ECG Reviewed:  Date:  Results:  SR  Cath:  Date: Results:      METS/Exercise Tolerance: >4 METS    Hematologic:  - neg hematologic  ROS     Musculoskeletal:  - neg musculoskeletal ROS     GI/Hepatic:       Renal/Genitourinary:  - neg Renal ROS     Endo: Comment: Thyroid nodule      Psychiatric/Substance Use: Comment: ADD, autistic    (+) psychiatric history other (comment)   Recreational drug usage: Cannabis.    Infectious Disease:       Malignancy:       Other:       "      Physical Exam    Airway        Mallampati: II   TM distance: > 3 FB   Neck ROM: full   Mouth opening: > 3 cm    Respiratory Devices and Support         Dental       (+) Minor Abnormalities - some fillings, tiny chips      Cardiovascular   cardiovascular exam normal          Pulmonary   pulmonary exam normal                OUTSIDE LABS:  CBC:   Lab Results   Component Value Date    WBC 7.0 04/28/2023    WBC 7.4 11/05/2022    HGB 14.8 04/28/2023    HGB 15.5 11/05/2022    HCT 44.7 04/28/2023    HCT 45.3 11/05/2022     04/28/2023     11/05/2022     BMP:   Lab Results   Component Value Date     04/28/2023     11/05/2022    POTASSIUM 4.2 04/28/2023    POTASSIUM 3.8 11/05/2022    CHLORIDE 102 04/28/2023    CHLORIDE 100 11/05/2022    CO2 29 04/28/2023    CO2 21 11/05/2022    BUN 17.7 04/28/2023    BUN 13 11/05/2022    CR 1.02 04/28/2023    CR 1.05 11/05/2022     (H) 04/28/2023    GLC 92 11/05/2022     COAGS: No results found for: \"PTT\", \"INR\", \"FIBR\"  POC: No results found for: \"BGM\", \"HCG\", \"HCGS\"  HEPATIC:   Lab Results   Component Value Date    ALBUMIN 4.4 04/28/2023    PROTTOTAL 7.1 04/28/2023    ALT 30 04/28/2023    AST 25 04/28/2023    ALKPHOS 62 04/28/2023    BILITOTAL 0.6 04/28/2023     OTHER:   Lab Results   Component Value Date    ROXANNA 9.7 04/28/2023    MAG 1.8 (L) 11/05/2022    TSH 1.55 10/27/2022       Anesthesia Plan    ASA Status:  2       Anesthesia Type: General.     - Airway: ETT              Consents    Anesthesia Plan(s) and associated risks, benefits, and realistic alternatives discussed. Questions answered and patient/representative(s) expressed understanding.     - Discussed: Risks, Benefits and Alternatives for BOTH SEDATION and the PROCEDURE were discussed     - Discussed with:  Patient      - Extended Intubation/Ventilatory Support Discussed: No.      - Patient is DNR/DNI Status: No     Use of blood products discussed: No .     Postoperative Care    Pain " "management: Multi-modal analgesia.   PONV prophylaxis: Ondansetron (or other 5HT-3), Dexamethasone or Solumedrol     Comments:    Other Comments: Prone positioning planned            ABI Veras CRNA    I have reviewed the pertinent notes and labs in the chart from the past 30 days and (re)examined the patient.  Any updates or changes from those notes are reflected in this note.    Clinically Significant Risk Factors Present on Admission                             # Obesity: Estimated body mass index is 34.39 kg/m  as calculated from the following:    Height as of 1/20/25: 1.702 m (5' 7\").    Weight as of 1/20/25: 99.6 kg (219 lb 9.6 oz).                "

## 2025-02-12 NOTE — INTERVAL H&P NOTE
I saw and examined Vinayak Leavitt.  I have reviewed the history and physical and find no changes to the patient's medical status or condition with the exceptions noted below.   Vinayak Leavitt denies pain, swelling, fevers or drainage.       Tyree Gross MD   8:26 AM 2/12/2025

## 2025-02-12 NOTE — DISCHARGE INSTRUCTIONS
[facility]  Adult Discharge Orders & Instructions      For 24 hours after surgery:  Get plenty of rest.  A responsible adult must stay with you for at least 24 hours after you leave the hospital.   You may feel lightheaded.  IF so, sit for a few minutes before standing.  Have someone help you get up.   You may have a slight fever. Call the doctor if your fever is over 101 F (38.3 C) (taken under the tongue) or lasts longer than 24 hours.  You may have a dry mouth, a sore throat, muscle aches or trouble sleeping.  These should go away after 24 hours.  Do not make important or legal decisions.  6.   Do not drive or use heavy equipment.  If you have weakness or tingling, don't drive or use heavy equipment until this feeling goes away.                                                                                                                                                                         To contact a doctor, call    [phone]______________

## 2025-02-12 NOTE — OP NOTE
PREOPERATIVE  DIAGNOSIS: pilonidal cyst     POSTOPERATIVE DIAGNOSIS:same    PROCEDURE PERFORMED: Pilonidal cyst excision with rhomboid flap closure    ESTIMATED BLOOD LOSS: 15 mL    SURGEON:  Tyree Gross MD     ASSISTANT: Circulator: Jocelyne Henderson RN  Scrub Person: Adelaida Wong  First Assistant: Denys Rosario RN    ANESTHESIA: GeneralCRNA Independent: Hung Mejia APRN CRNA    FAMILYPHYSICIAN: ElliottRaquel samanoe      INDICATION FOR THE PROCEDURE:  The patient is a 24 year old y.o. male with a chronic, recurrent pilonidal cyst.       PROCEDURE:  Vinayak Leavitt was brought to the operating room placed in supine position.    General anesthetic was applied and the patient was intubated.  Patient was then placed in the prone position.  Buttocks were taped apart with silk tape.  Patient was prepped and draped in usual sterile fashion.  Timeout was performed and everyone was in agreement to proceed.  There are 4 visible pits at the  cleft, all were probed and seem to track medially towards the main cyst cavity.  The skin surrounding the  cleft and onto the left buttock was infiltrated with local anesthetic.  All openings were connected and the cyst cavity was completely opened.  There was significant amount of hair and other debris within the cyst.  This was cleaned out.  The cyst cavity was thoroughly examined and there were no tracks.  A jayant shape border was drawn outside of the open wound.  Incision was made with scalpel and the cyst cavity was excised with a rim of normal tissue.  Cavity was evaluated and irrigated, all bleeding was addressed.  The resultant cavity measured approximately 8 cm x 6 cm x 4 cm.  The tape was released from the table and the skin was marked laterally to the left for rhomboid flap creation.  The skin was infiltrated with local anesthetic and the incision was made.  Dissection was carried down sharply to approximately Becki's fascia.  To the flap was  then rotated medially and the deep tissues were reapproximated with interrupted 2-0 Vicryl suture.  The skin was then closed with simple erupted 3-0 nylon stitches.  There is very little tension on the skin in any location and the flap was an excellent fit.  The skin appeared healthy.  The final length of the incision is 23 cm.  The skin was cleansed and dried and dressed with Xeroform, bulky gauze dressing and mesh underwear.  At the end of the case all sponge and needle counts were correct.  The patient tolerated procedure well. They were extubated in the OR and was taken to the recovery room in good condition.        SANDRA Gross MD

## 2025-02-12 NOTE — ANESTHESIA PROCEDURE NOTES
Airway       Patient location during procedure: OR       Procedure Start/Stop Times: 2/12/2025 9:13 AM  Staff -        CRNA: Hung Mejia APRN CRNA       Performed By: CRNAIndications and Patient Condition       Indications for airway management: doroteo-procedural       Induction type:intravenous       Mask difficulty assessment: 1 - vent by mask    Final Airway Details       Final airway type: endotracheal airway       Successful airway: ETT - single  Endotracheal Airway Details        ETT size (mm): 8.0       Successful intubation technique: direct laryngoscopy       DL Blade Type: Marrufo 2       Grade View of Cords: 1       Adjucts: stylet       Position: Center       Measured from: gums/teeth       Secured at (cm): 23       Bite block used: None    Post intubation assessment        Placement verified by: capnometry, equal breath sounds and chest rise        Number of attempts at approach: 1       Number of other approaches attempted: 0       Secured with: tape       Ease of procedure: easy       Dentition: Intact and Unchanged    Medication(s) Administered   Medication Administration Time: 2/12/2025 9:13 AM

## 2025-02-12 NOTE — ANESTHESIA POSTPROCEDURE EVALUATION
Patient: Vinayak Leavitt    Procedure: Procedure(s):  EXCISION, PILONIDAL CYST, pilonidal cyst excision       Anesthesia Type:  General    Note:  Disposition: Outpatient   Postop Pain Control: Uneventful            Sign Out: Well controlled pain   PONV: No   Neuro/Psych: Uneventful            Sign Out: Acceptable/Baseline neuro status   Airway/Respiratory: Uneventful            Sign Out: Acceptable/Baseline resp. status   CV/Hemodynamics: Uneventful            Sign Out: Acceptable CV status; No obvious hypovolemia; No obvious fluid overload   Other NRE: NONE   DID A NON-ROUTINE EVENT OCCUR? No           Last vitals:  Vitals Value Taken Time   /63 02/12/25 1045   Temp 96.9  F (36.1  C) 02/12/25 1050   Pulse 105 02/12/25 1050   Resp 2 02/12/25 1050   SpO2 94 % 02/12/25 1050   Vitals shown include unfiled device data.    Electronically Signed By: ABI Veras CRNA  February 12, 2025  10:51 AM

## 2025-02-12 NOTE — OR NURSING
PACU Transfer Note    Vinayak Leavitt was transferred to DSU via cart.  Equipment used for transport:  none.  Accompanied by:  RN  Prescriptions were: escribed    PACU Respiratory Event Documentation     1) Episodes of Apnea greater than or equal to 10 seconds: 0    2) Bradypnea - less than 8 breaths per minute: 0    3) Pain score on 0 to 10 scale: 0    4) Pain-sedation mismatch (yes or no): no    5) Repeated 02 desaturation less than 90% (yes or no): no    Anesthesia notified? (yes or no): na    Any of the above events occuring repeatedly in separate 30 minute intervals may be considered recurrent PACU respiratory events.    Patient stable and meets phase 1 discharge criteria for transport from PACU.

## 2025-02-14 LAB
PATH REPORT.COMMENTS IMP SPEC: NORMAL
PATH REPORT.FINAL DX SPEC: NORMAL
PATH REPORT.RELEVANT HX SPEC: NORMAL
PHOTO IMAGE: NORMAL

## 2025-02-24 ENCOUNTER — OFFICE VISIT (OUTPATIENT)
Dept: SURGERY | Facility: OTHER | Age: 25
End: 2025-02-24
Attending: SURGERY
Payer: COMMERCIAL

## 2025-02-24 VITALS
OXYGEN SATURATION: 98 % | TEMPERATURE: 98.4 F | SYSTOLIC BLOOD PRESSURE: 118 MMHG | HEART RATE: 84 BPM | DIASTOLIC BLOOD PRESSURE: 72 MMHG | RESPIRATION RATE: 18 BRPM

## 2025-02-24 DIAGNOSIS — Z98.890 POSTOPERATIVE STATE: Primary | ICD-10-CM

## 2025-02-24 PROCEDURE — 99024 POSTOP FOLLOW-UP VISIT: CPT | Performed by: SURGERY

## 2025-02-24 PROCEDURE — G0463 HOSPITAL OUTPT CLINIC VISIT: HCPCS

## 2025-02-24 ASSESSMENT — PAIN SCALES - GENERAL: PAINLEVEL_OUTOF10: NO PAIN (0)

## 2025-02-24 NOTE — NURSING NOTE
"Chief Complaint   Patient presents with    Surgical Followup     Pilonidal cyst exision 2/12/25       Initial /72 (BP Location: Right arm, Patient Position: Sitting, Cuff Size: Adult Regular)   Pulse 84   Temp 98.4  F (36.9  C) (Tympanic)   Resp 18   SpO2 98%  Estimated body mass index is 34.3 kg/m  as calculated from the following:    Height as of 1/20/25: 1.702 m (5' 7\").    Weight as of 2/12/25: 99.3 kg (219 lb).  Meds Reconciled: complete      FOOD SECURITY SCREENING QUESTIONS  Hunger Vital Signs:  Within the past 12 months we worried whether our food would run out before we got money to buy more. Never  Within the past 12 months the food we bought just didn't last and we didn't have money to get more. Never  Vanita Stewart RN 2/24/2025 9:26 AM        Vanita Stewart RN     "

## 2025-02-24 NOTE — PROGRESS NOTES
Vinayak Leavitt presents to clinic for follow-up of pilonidal cyst excision with rhomboid flap closure.  Patient states he is doing well.  Denies pain from the area denies drainage or bleeding.  States he had drainage or bleeding for only a day or 2 after surgery.  Otherwise, no concerns.      /72 (BP Location: Right arm, Patient Position: Sitting, Cuff Size: Adult Regular)   Pulse 84   Temp 98.4  F (36.9  C) (Tympanic)   Resp 18   SpO2 98%     Incision is clean, dry, intact with nylon sutures.  No evidence of dehiscence, no evidence of infection.  Sutures are removed in clinic today.      Pilonidal cyst pathology report shows benign cyst tissue.      Vinayak Leavitt is a 24-year-old male status post pilonidal cyst excision with rhomboid flap closure patient is recovering as expected and there are no apparent surgical complications.  Follow-up in clinic with concerns.      Tyree Gross MD

## 2025-03-24 DIAGNOSIS — I47.10 PAROXYSMAL SUPRAVENTRICULAR TACHYCARDIA: ICD-10-CM

## 2025-03-24 DIAGNOSIS — E55.9 VITAMIN D INSUFFICIENCY: ICD-10-CM

## 2025-03-24 NOTE — NURSING NOTE
Chief Complaint   Patient presents with     Consult     UTI symptoms   Patient presents to the clinic today for a consult for a UTI    Review of Systems:    Weight loss:    Yes     Recent fever/chills:  No   Night sweats:   No  Current skin rash:  No   Recent hair loss:  No  Heat intolerance:  No   Cold intolerance:  No  Chest pain:   No   Palpitations:   No  Shortness of breath:  No   Wheezing:   No  Constipation:    No   Diarrhea:   No   Nausea:   No   Vomiting:   No   Kidney/side pain:  No   Back pain:   No  Frequent headaches:  No   Dizziness:     No  Leg swelling:   No   Calf pain:    No    Post-Void Residual  A post-void residual was measured by ultrasonic bladder scanner.  0 mL  Tory Harvey LPN  12/5/2022 3:16 PM      Medication Reconciliation: completed   Tory Harvey LPN  12/5/2022 3:04 PM   
no

## 2025-03-25 RX ORDER — METOPROLOL SUCCINATE 25 MG/1
25 TABLET, EXTENDED RELEASE ORAL EVERY EVENING
Qty: 90 TABLET | Refills: 0 | Status: SHIPPED | OUTPATIENT
Start: 2025-03-25

## 2025-03-25 RX ORDER — CHOLECALCIFEROL (VITAMIN D3) 50 MCG
50 TABLET ORAL DAILY
Qty: 90 TABLET | Refills: 0 | Status: SHIPPED | OUTPATIENT
Start: 2025-03-25

## 2025-03-25 NOTE — TELEPHONE ENCOUNTER
[id number] sent Rx request for the following:      Requested Prescriptions   Pending Prescriptions Disp Refills    Vitamin D3 50 mcg (2000 units) tablet 90 tablet 4     Sig: Take 1 tablet (50 mcg) by mouth daily.   Last Prescription Date:   11/17/23  Last Fill Qty/Refills:         90, R-4    Vitamin D insufficiency [E55.9]          metoprolol succinate ER (TOPROL XL) 25 MG 24 hr tablet 90 tablet 0     Sig: Take 1 tablet (25 mg) by mouth every evening.   Last Prescription Date:   3/19/24  Last Fill Qty/Refills:         90, R-0    Paroxysmal supraventricular tachycardia [I47.10]        Last Office Visit:                12/31/24 1/20/23    Future Office visit:           None    Pt due for annual exam. Routing to provider for refill consideration. Routing to Unit scheduling pool, to assist Pt in scheduling appointment. Unable to complete prescription refill per RN Medication Refill Policy. Myrna Love RN .............. 3/25/2025  10:08 AM

## 2025-04-15 ENCOUNTER — OFFICE VISIT (OUTPATIENT)
Dept: FAMILY MEDICINE | Facility: OTHER | Age: 25
End: 2025-04-15
Attending: NURSE PRACTITIONER
Payer: COMMERCIAL

## 2025-04-15 VITALS
WEIGHT: 212.6 LBS | DIASTOLIC BLOOD PRESSURE: 84 MMHG | HEART RATE: 108 BPM | OXYGEN SATURATION: 98 % | BODY MASS INDEX: 33.37 KG/M2 | HEIGHT: 67 IN | RESPIRATION RATE: 18 BRPM | SYSTOLIC BLOOD PRESSURE: 130 MMHG | TEMPERATURE: 98.2 F

## 2025-04-15 DIAGNOSIS — Z13.1 SCREENING FOR DIABETES MELLITUS: ICD-10-CM

## 2025-04-15 DIAGNOSIS — E66.09 CLASS 1 OBESITY DUE TO EXCESS CALORIES WITHOUT SERIOUS COMORBIDITY WITH BODY MASS INDEX (BMI) OF 33.0 TO 33.9 IN ADULT: ICD-10-CM

## 2025-04-15 DIAGNOSIS — I47.10 PAROXYSMAL SUPRAVENTRICULAR TACHYCARDIA: ICD-10-CM

## 2025-04-15 DIAGNOSIS — E66.811 CLASS 1 OBESITY DUE TO EXCESS CALORIES WITHOUT SERIOUS COMORBIDITY WITH BODY MASS INDEX (BMI) OF 33.0 TO 33.9 IN ADULT: ICD-10-CM

## 2025-04-15 DIAGNOSIS — Z00.00 ROUTINE GENERAL MEDICAL EXAMINATION AT A HEALTH CARE FACILITY: Primary | ICD-10-CM

## 2025-04-15 DIAGNOSIS — E55.9 VITAMIN D DEFICIENCY: ICD-10-CM

## 2025-04-15 DIAGNOSIS — F84.0 ACTIVE AUTISTIC DISORDER: ICD-10-CM

## 2025-04-15 LAB
ALBUMIN SERPL BCG-MCNC: 4.9 G/DL (ref 3.5–5.2)
ALP SERPL-CCNC: 87 U/L (ref 40–150)
ALT SERPL W P-5'-P-CCNC: 26 U/L (ref 0–70)
ANION GAP SERPL CALCULATED.3IONS-SCNC: 10 MMOL/L (ref 7–15)
AST SERPL W P-5'-P-CCNC: 23 U/L (ref 0–45)
BILIRUB SERPL-MCNC: 0.5 MG/DL
BUN SERPL-MCNC: 16.6 MG/DL (ref 6–20)
CALCIUM SERPL-MCNC: 10.2 MG/DL (ref 8.8–10.4)
CHLORIDE SERPL-SCNC: 103 MMOL/L (ref 98–107)
CREAT SERPL-MCNC: 1.14 MG/DL (ref 0.67–1.17)
EGFRCR SERPLBLD CKD-EPI 2021: >90 ML/MIN/1.73M2
EST. AVERAGE GLUCOSE BLD GHB EST-MCNC: 100 MG/DL
GLUCOSE SERPL-MCNC: 101 MG/DL (ref 70–99)
HBA1C MFR BLD: 5.1 %
HCO3 SERPL-SCNC: 26 MMOL/L (ref 22–29)
POTASSIUM SERPL-SCNC: 4.5 MMOL/L (ref 3.4–5.3)
PROT SERPL-MCNC: 8.2 G/DL (ref 6.4–8.3)
SODIUM SERPL-SCNC: 139 MMOL/L (ref 135–145)

## 2025-04-15 PROCEDURE — 82306 VITAMIN D 25 HYDROXY: CPT | Mod: ZL | Performed by: NURSE PRACTITIONER

## 2025-04-15 PROCEDURE — 36415 COLL VENOUS BLD VENIPUNCTURE: CPT | Mod: ZL | Performed by: NURSE PRACTITIONER

## 2025-04-15 PROCEDURE — 84450 TRANSFERASE (AST) (SGOT): CPT | Mod: ZL | Performed by: NURSE PRACTITIONER

## 2025-04-15 PROCEDURE — 82040 ASSAY OF SERUM ALBUMIN: CPT | Mod: ZL | Performed by: NURSE PRACTITIONER

## 2025-04-15 PROCEDURE — 83036 HEMOGLOBIN GLYCOSYLATED A1C: CPT | Mod: ZL | Performed by: NURSE PRACTITIONER

## 2025-04-15 PROCEDURE — G0463 HOSPITAL OUTPT CLINIC VISIT: HCPCS

## 2025-04-15 RX ORDER — METOPROLOL SUCCINATE 25 MG/1
25 TABLET, EXTENDED RELEASE ORAL EVERY EVENING
Qty: 90 TABLET | Refills: 4 | Status: SHIPPED | OUTPATIENT
Start: 2025-04-15

## 2025-04-15 SDOH — HEALTH STABILITY: PHYSICAL HEALTH: ON AVERAGE, HOW MANY MINUTES DO YOU ENGAGE IN EXERCISE AT THIS LEVEL?: 90 MIN

## 2025-04-15 SDOH — HEALTH STABILITY: PHYSICAL HEALTH: ON AVERAGE, HOW MANY DAYS PER WEEK DO YOU ENGAGE IN MODERATE TO STRENUOUS EXERCISE (LIKE A BRISK WALK)?: 3 DAYS

## 2025-04-15 ASSESSMENT — PATIENT HEALTH QUESTIONNAIRE - PHQ9
10. IF YOU CHECKED OFF ANY PROBLEMS, HOW DIFFICULT HAVE THESE PROBLEMS MADE IT FOR YOU TO DO YOUR WORK, TAKE CARE OF THINGS AT HOME, OR GET ALONG WITH OTHER PEOPLE: NOT DIFFICULT AT ALL
SUM OF ALL RESPONSES TO PHQ QUESTIONS 1-9: 2
SUM OF ALL RESPONSES TO PHQ QUESTIONS 1-9: 2

## 2025-04-15 ASSESSMENT — PAIN SCALES - GENERAL: PAINLEVEL_OUTOF10: NO PAIN (0)

## 2025-04-15 ASSESSMENT — SOCIAL DETERMINANTS OF HEALTH (SDOH): HOW OFTEN DO YOU GET TOGETHER WITH FRIENDS OR RELATIVES?: MORE THAN THREE TIMES A WEEK

## 2025-04-15 NOTE — PATIENT INSTRUCTIONS
Patient Education   Preventive Care Advice   This is general advice given by our system to help you stay healthy. However, your care team may have specific advice just for you. Please talk to your care team about your preventive care needs.  Nutrition  Eat 5 or more servings of fruits and vegetables each day.  Try wheat bread, brown rice and whole grain pasta (instead of white bread, rice, and pasta).  Get enough calcium and vitamin D. Check the label on foods and aim for 100% of the RDA (recommended daily allowance).  Lifestyle  Exercise at least 150 minutes each week  (30 minutes a day, 5 days a week).  Do muscle strengthening activities 2 days a week. These help control your weight and prevent disease.  No smoking.  Wear sunscreen to prevent skin cancer.  Have a dental exam and cleaning every 6 months.  Yearly exams  See your health care team every year to talk about:  Any changes in your health.  Any medicines your care team has prescribed.  Preventive care, family planning, and ways to prevent chronic diseases.  Shots (vaccines)   HPV shots (up to age 26), if you've never had them before.  Hepatitis B shots (up to age 59), if you've never had them before.  COVID-19 shot: Get this shot when it's due.  Flu shot: Get a flu shot every year.  Tetanus shot: Get a tetanus shot every 10 years.  Pneumococcal, hepatitis A, and RSV shots: Ask your care team if you need these based on your risk.  Shingles shot (for age 50 and up)  General health tests  Diabetes screening:  Starting at age 35, Get screened for diabetes at least every 3 years.  If you are younger than age 35, ask your care team if you should be screened for diabetes.  Cholesterol test: At age 39, start having a cholesterol test every 5 years, or more often if advised.  Bone density scan (DEXA): At age 50, ask your care team if you should have this scan for osteoporosis (brittle bones).  Hepatitis C: Get tested at least once in your life.  STIs (sexually  transmitted infections)  Before age 24: Ask your care team if you should be screened for STIs.  After age 24: Get screened for STIs if you're at risk. You are at risk for STIs (including HIV) if:  You are sexually active with more than one person.  You don't use condoms every time.  You or a partner was diagnosed with a sexually transmitted infection.  If you are at risk for HIV, ask about PrEP medicine to prevent HIV.  Get tested for HIV at least once in your life, whether you are at risk for HIV or not.  Cancer screening tests  Cervical cancer screening: If you have a cervix, begin getting regular cervical cancer screening tests starting at age 21.  Breast cancer scan (mammogram): If you've ever had breasts, begin having regular mammograms starting at age 40. This is a scan to check for breast cancer.  Colon cancer screening: It is important to start screening for colon cancer at age 45.  Have a colonoscopy test every 10 years (or more often if you're at risk) Or, ask your provider about stool tests like a FIT test every year or Cologuard test every 3 years.  To learn more about your testing options, visit:   .  For help making a decision, visit:   https://bit.ly/tx73140.  Prostate cancer screening test: If you have a prostate, ask your care team if a prostate cancer screening test (PSA) at age 55 is right for you.  Lung cancer screening: If you are a current or former smoker ages 50 to 80, ask your care team if ongoing lung cancer screenings are right for you.  For informational purposes only. Not to replace the advice of your health care provider. Copyright   2023 [facility]. All rights reserved. Clinically reviewed by the [facility] Transitions Program. [place] 002145 - REV 01/24.  Learning About Stress  What is stress?     Stress is your body's response to a hard situation. Your body can have a physical, emotional, or mental response. Stress is a fact of life for most people, and it  affects everyone differently. What causes stress for you may not be stressful for someone else.  A lot of things can cause stress. You may feel stress when you go on a job interview, take a test, or run a race. This kind of short-term stress is normal and even useful. It can help you if you need to work hard or react quickly. For example, stress can help you finish an important job on time.  Long-term stress is caused by ongoing stressful situations or events. Examples of long-term stress include long-term health problems, ongoing problems at work, or conflicts in your family. Long-term stress can harm your health.  How does stress affect your health?  When you are stressed, your body responds as though you are in danger. It makes hormones that speed up your heart, make you breathe faster, and give you a burst of energy. This is called the fight-or-flight stress response. If the stress is over quickly, your body goes back to normal and no harm is done.  But if stress happens too often or lasts too long, it can have bad effects. Long-term stress can make you more likely to get sick, and it can make symptoms of some diseases worse. If you tense up when you are stressed, you may develop neck, shoulder, or low back pain. Stress is linked to high blood pressure and heart disease.  Stress also harms your emotional health. It can make you osborne, tense, or depressed. Your relationships may suffer, and you may not do well at work or school.  What can you do to manage stress?  You can try these things to help manage stress:   Do something active. Exercise or activity can help reduce stress. Walking is a great way to get started. Even everyday activities such as housecleaning or yard work can help.  Try yoga or sherrie chi. These techniques combine exercise and meditation. You may need some training at first to learn them.  Do something you enjoy. For example, listen to music or go to a movie. Practice your hobby or do volunteer  "work.  Meditate. This can help you relax, because you are not worrying about what happened before or what may happen in the future.  Do guided imagery. Imagine yourself in any setting that helps you feel calm. You can use online videos, books, or a teacher to guide you.  Do breathing exercises. For example:  From a standing position, bend forward from the waist with your knees slightly bent. Let your arms dangle close to the floor.  Breathe in slowly and deeply as you return to a standing position. Roll up slowly and lift your head last.  Hold your breath for just a few seconds in the standing position.  Breathe out slowly and bend forward from the waist.  Let your feelings out. Talk, laugh, cry, and express anger when you need to. Talking with supportive friends or family, a counselor, or a jimmy leader about your feelings is a healthy way to relieve stress. Avoid discussing your feelings with people who make you feel worse.  Write. It may help to write about things that are bothering you. This helps you find out how much stress you feel and what is causing it. When you know this, you can find better ways to cope.  What can you do to prevent stress?  You might try some of these things to help prevent stress:  Manage your time. This helps you find time to do the things you want and need to do.  Get enough sleep. Your body recovers from the stresses of the day while you are sleeping.  Get support. Your family, friends, and community can make a difference in how you experience stress.  Limit your news feed. Avoid or limit time on social media or news that may make you feel stressed.  Do something active. Exercise or activity can help reduce stress. Walking is a great way to get started.  Where can you learn more?  Go to https://www.Mom[employer].me.net/patiented  Enter N032 in the search box to learn more about \"Learning About Stress.\"  Current as of: October 24, 2024  Content Version: 14.4 2024-2025 [employer].   Care instructions adapted under license by your healthcare professional. If you have questions about a medical condition or this instruction, always ask your healthcare professional. [employer] disclaims any warranty or liability for your use of this information.

## 2025-04-15 NOTE — PROGRESS NOTES
"Preventive Care Visit  [facility] AND [facility]  Raquel Elliott, APRN CNP, Nurse Practitioner - Family  Apr 15, 2025      Assessment & Plan   Problem List Items Addressed This Visit          Circulatory    Paroxysmal supraventricular tachycardia    Relevant Medications    metoprolol succinate ER (TOPROL XL) 25 MG 24 hr tablet       Behavioral    Active autistic disorder     Other Visit Diagnoses       Routine general medical examination at a health care facility    -  Primary    Screening for diabetes mellitus        Relevant Orders    Comprehensive Metabolic Panel    Hemoglobin A1c    Vitamin D deficiency        Relevant Orders    Vitamin D Total        Problem metoprolol well, keeping his SVT symptoms under control.  Regarding congestive disorder, this remains stable  Glucose level from 2023 was 123.  Recommend repeating this for screening of diabetes.  Family history of diabetes as well.  CMP, A1c obtained.  Vitamin D level also obtained, taking vitamin D supplementation.  Discussed regular exercise, dietary changes due to obesity  Follow-up annually and as needed        The longitudinal plan of care for the diagnosis(es)/condition(s) as documented were addressed during this visit. Due to the added complexity in care, I will continue to support Vinayak in the subsequent management and with ongoing continuity of care.    Patient has been advised of split billing requirements and indicates understanding: Yes       BMI  Estimated body mass index is 33.3 kg/m  as calculated from the following:    Height as of this encounter: 1.702 m (5' 7\").    Weight as of this encounter: 96.4 kg (212 lb 9.6 oz).   Weight management plan: Discussed healthy diet and exercise guidelines    Counseling  Appropriate preventive services were addressed with this patient via screening, questionnaire, or discussion as appropriate for fall prevention, nutrition, physical activity, Tobacco-use cessation, social engagement, weight loss " and cognition.  Checklist reviewing preventive services available has been given to the patient.  Reviewed patient's diet, addressing concerns and/or questions.   He is at risk for lack of exercise and has been provided with information to increase physical activity for the benefit of his well-being.   The patient was instructed to see the dentist every 6 months.   He is at risk for psychosocial distress and has been provided with information to reduce risk.         Return in about 53 weeks (around 4/21/2026) for Annual Wellness Visit.      Marisa Licea is a 24 year old, presenting for the following:  Physical           History of Present Illness       Reason for visit:  Check up   He is taking medications regularly.    He presents to clinic for annual wellness exam.  Does need refill of metoprolol.  Otherwise he was feeling well with no health concerns at this time.     Advance Care Planning  Patient does not have a Health Care Directive:       4/15/2025   General Health   How would you rate your overall physical health? Good   Feel stress (tense, anxious, or unable to sleep) To some extent   (!) STRESS CONCERN      4/15/2025   Nutrition   Three or more servings of calcium each day? Yes   Diet: Regular (no restrictions)   How many servings of fruit and vegetables per day? (!) 2-3   How many sweetened beverages each day? 0-1         4/15/2025   Exercise   Days per week of moderate/strenous exercise 3 days   Average minutes spent exercising at this level 90 min         4/15/2025   Social Factors   Frequency of gathering with friends or relatives More than three times a week   Worry food won't last until get money to buy more No   Food not last or not have enough money for food? No   Do you have housing? (Housing is defined as stable permanent housing and does not include staying ouside in a car, in a tent, in an abandoned building, in an overnight shelter, or couch-surfing.) Yes   Are you worried about losing  "your housing? No   Lack of transportation? No   Unable to get utilities (heat,electricity)? No         4/15/2025   Dental   Dentist two times every year? (!) NO         Today's PHQ-9 Score:       4/15/2025     2:34 PM   PHQ-9 SCORE   PHQ-9 Total Score MyChart 2 (Minimal depression)   PHQ-9 Total Score 2        Proxy-reported         4/15/2025   Substance Use   Alcohol more than 3/day or more than 7/wk No   Do you use any other substances recreationally? No     Social History     Tobacco Use    Smoking status: Never    Smokeless tobacco: Never    Tobacco comments:     Quit smoking: mom smokes around him every once in awhile   Vaping Use    Vaping status: Some Days    Substances: Flavoring, \"zero nicotine\" - stopped after 1/20/25   Substance Use Topics    Alcohol use: Yes    Drug use: No           4/15/2025   STI Screening   New sexual partner(s) since last STI/HIV test? No         4/15/2025   Contraception/Family Planning   Questions about contraception or family planning No        Reviewed and updated as needed this visit by Provider   Tobacco  Allergies  Meds  Problems  Med Hx  Surg Hx  Fam Hx               Objective    Exam  /84   Pulse 108   Temp 98.2  F (36.8  C)   Resp 18   Ht 1.702 m (5' 7\")   Wt 96.4 kg (212 lb 9.6 oz)   SpO2 98%   BMI 33.30 kg/m     Estimated body mass index is 33.3 kg/m  as calculated from the following:    Height as of this encounter: 1.702 m (5' 7\").    Weight as of this encounter: 96.4 kg (212 lb 9.6 oz).    Physical Exam  GENERAL: alert and no distress  EYES: Eyes grossly normal to inspection, PERRL and conjunctivae and sclerae normal  HENT: ear canals and TM's normal, nose and mouth without ulcers or lesions  NECK: no adenopathy, no asymmetry, masses, or scars  RESP: lungs clear to auscultation - no rales, rhonchi or wheezes  CV: regular rate and rhythm, normal S1 S2, no S3 or S4, no murmur, click or rub, no peripheral edema  ABDOMEN: soft, nontender, no " hepatosplenomegaly, no masses and bowel sounds normal  MS: no gross musculoskeletal defects noted, no edema  SKIN: no suspicious lesions or rashes  NEURO: Normal strength and tone, mentation intact and speech normal  PSYCH: mentation appears normal, affect normal/bright        Signed Electronically by: ABI Jin CNP

## 2025-04-15 NOTE — NURSING NOTE
Patient presents today for annual physical.    Medication Reconciliation Complete    Rosamaria Vidal LPN  4/15/2025 2:30 PM

## 2025-04-16 LAB — VIT D+METAB SERPL-MCNC: 27 NG/ML (ref 20–50)

## 2025-06-20 ENCOUNTER — TELEPHONE (OUTPATIENT)
Dept: FAMILY MEDICINE | Facility: OTHER | Age: 25
End: 2025-06-20
Payer: COMMERCIAL

## 2025-06-20 NOTE — TELEPHONE ENCOUNTER
Patient presented to the counter about some disability forms being left here for primary provider to fill out. Patient stated that he was advised that forms would be best filled out by proper person. Patient was inquiring about getting these forms back. Patient didn't know exactly which disability forms they were called but said they were left with provider.  Patient would like to be contacted about these documents.  Karina Luther on 6/20/2025 at 3:40 PM

## 2025-06-20 NOTE — TELEPHONE ENCOUNTER
Left message for patient to call back. Patient did drop off forms for us to fill out but they need to be filled out by an MD. Patient was supposed to contact [facility] and find out if they would accept an NP completing the paperwork. Patient never followed up with us so paperwork was not completed.    Rosamaria Vidal LPN on 6/20/2025 at 3:50 PM

## 2025-07-01 ENCOUNTER — HOSPITAL ENCOUNTER (EMERGENCY)
Facility: OTHER | Age: 25
Discharge: HOME | End: 2025-07-01
Payer: COMMERCIAL

## 2025-07-01 VITALS
HEART RATE: 102 BPM | DIASTOLIC BLOOD PRESSURE: 75 MMHG | RESPIRATION RATE: 18 BRPM | HEIGHT: 67 IN | BODY MASS INDEX: 33.74 KG/M2 | WEIGHT: 215 LBS | TEMPERATURE: 99.5 F | SYSTOLIC BLOOD PRESSURE: 124 MMHG | OXYGEN SATURATION: 95 %

## 2025-07-01 DIAGNOSIS — B34.9 VIRAL INFECTION: ICD-10-CM

## 2025-07-01 DIAGNOSIS — R50.9 FEVER: ICD-10-CM

## 2025-07-01 PROCEDURE — 250N000013 HC RX MED GY IP 250 OP 250 PS 637

## 2025-07-01 PROCEDURE — 87637 SARSCOV2&INF A&B&RSV AMP PRB: CPT | Performed by: FAMILY MEDICINE

## 2025-07-01 PROCEDURE — 99283 EMERGENCY DEPT VISIT LOW MDM: CPT

## 2025-07-01 RX ORDER — ACETAMINOPHEN 325 MG/1
975 TABLET ORAL ONCE
Status: COMPLETED | OUTPATIENT
Start: 2025-07-01 | End: 2025-07-01

## 2025-07-01 RX ADMIN — ACETAMINOPHEN 975 MG: 325 TABLET ORAL at 22:46

## 2025-07-01 ASSESSMENT — COLUMBIA-SUICIDE SEVERITY RATING SCALE - C-SSRS
2. HAVE YOU ACTUALLY HAD ANY THOUGHTS OF KILLING YOURSELF IN THE PAST MONTH?: NO
6. HAVE YOU EVER DONE ANYTHING, STARTED TO DO ANYTHING, OR PREPARED TO DO ANYTHING TO END YOUR LIFE?: NO
1. IN THE PAST MONTH, HAVE YOU WISHED YOU WERE DEAD OR WISHED YOU COULD GO TO SLEEP AND NOT WAKE UP?: NO

## 2025-07-01 ASSESSMENT — ENCOUNTER SYMPTOMS
MYALGIAS: 1
SORE THROAT: 1
FEVER: 1

## 2025-07-01 ASSESSMENT — ACTIVITIES OF DAILY LIVING (ADL): ADLS_ACUITY_SCORE: 41

## 2025-07-02 NOTE — DISCHARGE INSTRUCTIONS
Vinayak,   I suspect you may be experiencing an acute viral infection.   Tylenol ibuprofen, rest, increased fluid intake. I hope you feel better soon.   Return to be seen if new, worsening symptoms.       You have severe trouble breathing.     You passed out (lost consciousness).   Call your doctor now or seek immediate medical care if:    You seem to be getting much sicker.     You have a new or higher fever.     You have a severe headache.     You have a stiff neck.     You have blood in your stools.     You have new belly pain, or your pain gets worse.     You have a new rash.     You are confused or disoriented.     You have trouble thinking or concentrating.   Watch closely for changes in your health, and be sure to contact your doctor if:    You start to get better and then get worse.     You do not get better as expected.    More information in hand out.  Cece Khalil CNP

## 2025-07-02 NOTE — ED PROVIDER NOTES
"  History     Chief Complaint   Patient presents with    Flu Symptoms     The history is provided by the patient and medical records.     Vinayak Leavitt is a 24 year old male who presents to the emergency department today with his mother.  Patient reports that last night he started to feel warm, had a headache, sore throat, body aches and feels congested.  Today felt like he was \"burning up\" mother felt his forehead and he felt like he had a fever.  She did not give him any medications prior to arrival.  Patient has no other complaints, no chest pain, shortness of breath, cough, nausea vomiting diarrhea, abdominal pain, urinary symptoms, rashes or tick bites.  He is not immunocompromise is otherwise healthy.    Allergies:  No Known Allergies    Problem List:    Patient Active Problem List    Diagnosis Date Noted    Paroxysmal supraventricular tachycardia 11/07/2022     Priority: Medium    Active autistic disorder 02/13/2018     Priority: Medium     Overview:   High functioning, photogenic memory.  Diagnosed in early childhood by school psychologist.  Has an IEP and a para at school.  Doing grade level schoolwork.        Acne 05/17/2017     Priority: Medium        Past Medical History:    Past Medical History:   Diagnosis Date    Otitis media     Streptococcal pharyngitis        Past Surgical History:    Past Surgical History:   Procedure Laterality Date    CYSTECTOMY PILONIDAL N/A 2/12/2025    Procedure: EXCISION, PILONIDAL CYST, pilonidal cyst excision;  Surgeon: Tyree Gross MD;  Location:  OR    OTHER SURGICAL HISTORY      [id number],OTHER,lacerated tendon    OTHER SURGICAL HISTORY      [id number],OTHER,Times two    TONSILLECTOMY      06/2017       Family History:    Family History   Problem Relation Age of Onset    Heart Disease Mother         Heart Disease,cardiac coronary anomaly    Thyroid nodules Mother     Other - See Comments Father         MVA    Diabetes Maternal Grandmother     Attention " "Deficit Disorder Maternal Half-Sister         ADD / ADHD    Substance Abuse Maternal Half-Sister     Genetic Disorder No family hx of         Genetic,No family history of asthma, allergy or atopic dermatitis.       Social History:  Marital Status:  Single [1]  Social History     Tobacco Use    Smoking status: Never    Smokeless tobacco: Never    Tobacco comments:     Quit smoking: mom smokes around him every once in awhile   Vaping Use    Vaping status: Some Days    Substances: Flavoring, \"zero nicotine\" - stopped after 1/20/25   Substance Use Topics    Alcohol use: Yes    Drug use: No        Medications:    acetaminophen (TYLENOL) 500 MG tablet  chlorhexidine (PERIDEX) 0.12 % solution  ketoconazole (NIZORAL) 2 % external cream  metoprolol succinate ER (TOPROL XL) 25 MG 24 hr tablet  SODIUM FLUORIDE 5000 PPM 1.1 % PSTE dental paste  Vitamin D3 50 mcg (2000 units) tablet          Review of Systems   Constitutional:  Positive for fever.   HENT:  Positive for congestion and sore throat.    Musculoskeletal:  Positive for myalgias.   All other systems reviewed and are negative.      Physical Exam   BP: 109/70  Pulse: 120  Temp: 99.5  F (37.5  C)  Resp: 18  Height: 170.2 cm (5' 7\")  Weight: 97.5 kg (215 lb)  SpO2: 99 %      Physical Exam  Vitals and nursing note reviewed.   Constitutional:       General: He is not in acute distress.     Appearance: He is not ill-appearing or toxic-appearing.   HENT:      Head: Normocephalic.      Right Ear: Tympanic membrane normal.      Left Ear: Tympanic membrane normal.      Nose: Nose normal.      Mouth/Throat:      Mouth: Mucous membranes are moist.      Pharynx: No oropharyngeal exudate or posterior oropharyngeal erythema.   Cardiovascular:      Rate and Rhythm: Normal rate and regular rhythm.      Pulses: Normal pulses.      Heart sounds: Normal heart sounds.   Pulmonary:      Effort: Pulmonary effort is normal.      Breath sounds: Normal breath sounds.   Abdominal:      General: " Abdomen is flat.      Palpations: Abdomen is soft.      Tenderness: There is no abdominal tenderness. There is no right CVA tenderness or left CVA tenderness.   Musculoskeletal:         General: Normal range of motion.      Cervical back: Normal range of motion and neck supple.   Skin:     General: Skin is warm.      Capillary Refill: Capillary refill takes less than 2 seconds.      Findings: No rash.   Neurological:      General: No focal deficit present.      Mental Status: He is alert and oriented to person, place, and time.   Psychiatric:         Mood and Affect: Mood normal.         Behavior: Behavior normal.         ED Course            Recent Results (from the past 24 hours)   Influenza A/B, RSV and SARS-CoV2 PCR (COVID-19) Nose    Specimen: Nose; Swab   Result Value Ref Range    Influenza A PCR Negative Negative    Influenza B PCR Negative Negative    RSV PCR Negative Negative    SARS CoV2 PCR Negative Negative    Narrative    Testing was performed using the Xpert Xpress CoV2/Flu/RSV Assay on the [employer] GeneXpert Instrument. This test should be ordered for the detection of SARS-CoV2, influenza, and RSV viruses in individuals with signs and symptoms of respiratory tract infection. This test is for in vitro diagnostic use under the [place] FDA for laboratories certified under CLIA to perform high or moderate complexity testing. This test has been [place] FDA cleared. A negative result does not rule out the presence of PCR inhibitors in the specimen or target RNA in concentration below the limit of detection for the assay. If only one viral target is positive but coinfection with multiple targets is suspected, the sample should be re-tested with another FDA cleared, approved, or authorized test, if coninfection would change clinical management. This test was validated by the [facility]. These laboratories are certified under the Clinical Laboratory Improvement Amendments of 1988 (CLIA-88) as  "qualified to perfom high complexity laboratory testing.       Medications   acetaminophen (TYLENOL) tablet 975 mg (975 mg Oral $Given 7/1/25 [id number])       Assessments & Plan (with Medical Decision Making)  Vinayak Leavitt is a 24 year old male who presents to the emergency department today with his mother.  Patient reports that last night he started to feel warm, had a headache, sore throat, body aches and feels congested.  Today felt like he was \"burning up\" mother felt his forehead and he felt like he had a fever.  She did not give him any medications prior to arrival.  Patient has no other complaints, no chest pain, shortness of breath, cough, nausea vomiting diarrhea, abdominal pain, urinary symptoms, rashes or tick bites.  He is not immunocompromise is otherwise healthy.  /75   Pulse 102   Temp 99.5  F (37.5  C) (Oral)   Resp 18   Ht 1.702 m (5' 7\")   Wt 97.5 kg (215 lb)   SpO2 95%   BMI 33.67 kg/m     He is nontoxic, pleasant, non-distressed. Unremarkable, re-assuring physical examination.   I checked his temp at it was 100.3. no antipyretics. We gave him tylenol here.  Swabbed for COVID flu and influenza in triage and that was negative  Discussed with patient, mother that his symptoms are consistent with an acute viral infection.  We discussed symptomatic treatment at home, strict return precautions. Patient discharged home in stable condition, verbal understanding of discharge instructions.      I have reviewed the nursing notes.    I have reviewed the findings, diagnosis, plan and need for follow up with the patient.    Medical Decision Making  The patient's presentation was of low complexity (an acute and uncomplicated illness or injury).    The patient's evaluation involved:  ordering and/or review of 1 test(s) in this encounter (see separate area of note for details)    The patient's management necessitated moderate risk (prescription drug management including medications given in the " ED).        New Prescriptions    No medications on file       Final diagnoses:   Fever   Viral infection       7/1/2025   [facility]       Remi Cece, APRN CNP  07/01/25 [id number]

## 2025-07-03 NOTE — TELEPHONE ENCOUNTER
LVM to return call by end of day today because we have left 6 messages with no response by him.   Norma J. Gosselin, LPN .......  7/3/2025  10:11 AM

## 2025-07-03 NOTE — TELEPHONE ENCOUNTER
Spoke with patient and he said he needs to have an MD sign forms. Please call patient back when forms are ready to .  Norma J. Gosselin, LPN .......  7/3/2025  2:43 PM

## 2025-07-08 NOTE — TELEPHONE ENCOUNTER
Returned call to patient. Patient was notified paperwork is at the unit 1 check in for him to .    Rosamaria Vidal LPN on 7/8/2025 at 2:29 PM

## 2025-07-28 ENCOUNTER — OFFICE VISIT (OUTPATIENT)
Dept: FAMILY MEDICINE | Facility: OTHER | Age: 25
End: 2025-07-28
Attending: NURSE PRACTITIONER
Payer: COMMERCIAL

## 2025-07-28 VITALS
RESPIRATION RATE: 20 BRPM | BODY MASS INDEX: 33.8 KG/M2 | DIASTOLIC BLOOD PRESSURE: 86 MMHG | SYSTOLIC BLOOD PRESSURE: 138 MMHG | WEIGHT: 215.8 LBS | HEART RATE: 116 BPM | OXYGEN SATURATION: 97 %

## 2025-07-28 DIAGNOSIS — I47.10 PAROXYSMAL SUPRAVENTRICULAR TACHYCARDIA: ICD-10-CM

## 2025-07-28 DIAGNOSIS — F84.0 ACTIVE AUTISTIC DISORDER: Primary | ICD-10-CM

## 2025-07-28 PROCEDURE — G0463 HOSPITAL OUTPT CLINIC VISIT: HCPCS

## 2025-07-28 ASSESSMENT — PAIN SCALES - GENERAL: PAINLEVEL_OUTOF10: NO PAIN (0)

## 2025-07-28 NOTE — PROGRESS NOTES
Assessment & Plan   Problem List Items Addressed This Visit          Cardiovascular/Peripheral Vascular    Paroxysmal supraventricular tachycardia       Behavioral Health    Active autistic disorder - Primary       He does have a diagnosis of autism, he is high functioning, no physical disabilities. I did complete his form for disability stating he is a diagnosis of autism, this is scanned into his record. He does have a diagnosis of paroxysmal supraventricular tachycardia, rarely has symptoms, typically related to anxiety.    The longitudinal plan of care for the diagnosis(es)/condition(s) as documented were addressed during this visit. Due to the added complexity in care, I will continue to support Vinayak in the subsequent management and with ongoing continuity of care.        Subjective   Vinayak is a 24 year old, presenting for the following health issues:  Forms    History of Present Illness       Reason for visit:  Follow up for disability He is missing 5 dose(s) of medications per week.  He is not taking prescribed medications regularly due to remembering to take.         Presents to clinic today to have paperwork completed for disability. He is working on applying for Social Security disability due to autism, having a hard time concentrating, feeling very stressed. When he feels stressed he hangs out with friends, plays golf plays video games or leaves the house. He's been dealing with some depression but feels this is manageable and does not want medications. Worried about his mom and his grandmother's health. He does not have any physical disabilities, he is able to play sports, he physically active. 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 Reports he graduated just under a 4 point [place] GPA. Currently has his permit, no 's license.        Objective    /86   Pulse 116   Resp 20   Wt 97.9 kg (215 lb 12.8 oz)   SpO2 97%   BMI  33.80 kg/m    Body mass index is 33.8 kg/m .  Physical Exam   GENERAL: alert and no distress  EYES: Eyes grossly normal to inspection  NEURO: Normal strength and tone, mentation intact and speech normal  PSYCH: mentation appears normal, affect normal/bright            Signed Electronically by: ABI Jin CNP

## 2025-08-28 ENCOUNTER — OFFICE VISIT (OUTPATIENT)
Dept: FAMILY MEDICINE | Facility: OTHER | Age: 25
End: 2025-08-28
Payer: COMMERCIAL

## 2025-08-28 VITALS
SYSTOLIC BLOOD PRESSURE: 154 MMHG | OXYGEN SATURATION: 98 % | DIASTOLIC BLOOD PRESSURE: 92 MMHG | HEART RATE: 95 BPM | WEIGHT: 223.6 LBS | BODY MASS INDEX: 35.02 KG/M2 | TEMPERATURE: 98.9 F | RESPIRATION RATE: 18 BRPM

## 2025-08-28 DIAGNOSIS — R68.84 PAIN IN LOWER JAW: ICD-10-CM

## 2025-08-28 DIAGNOSIS — K08.9 POOR DENTITION: ICD-10-CM

## 2025-08-28 DIAGNOSIS — K04.7 DENTAL INFECTION: Primary | ICD-10-CM

## 2025-08-28 DIAGNOSIS — H61.23 BILATERAL IMPACTED CERUMEN: ICD-10-CM

## 2025-08-28 PROCEDURE — G0463 HOSPITAL OUTPT CLINIC VISIT: HCPCS

## 2025-08-28 PROCEDURE — 69209 REMOVE IMPACTED EAR WAX UNI: CPT

## 2025-08-28 RX ORDER — CHLORHEXIDINE GLUCONATE ORAL RINSE 1.2 MG/ML
15 SOLUTION DENTAL 2 TIMES DAILY
Qty: 473 ML | Refills: 0 | Status: SHIPPED | OUTPATIENT
Start: 2025-08-28

## 2025-08-28 ASSESSMENT — PAIN SCALES - GENERAL: PAINLEVEL_OUTOF10: MODERATE PAIN (5)

## (undated) DEVICE — SU VICRYL 0 UR-6 27" J603H

## (undated) DEVICE — SOL WATER 1500ML

## (undated) DEVICE — TRAY DRY SKIN PREP TRAY PREMIUM DYND70661

## (undated) DEVICE — ADH LIQUID MASTISOL TOPICAL VIAL 2-3ML 0523-48

## (undated) DEVICE — BLADE CLIPPER 4406

## (undated) DEVICE — PACK MAJOR LAPAROTOMY LF SBA15MLFCA

## (undated) DEVICE — GLOVE PROTEXIS POWDER FREE SMT 7.5  2D72PT75X

## (undated) DEVICE — SU VICRYL 2-0 CT-1 CR 8X18" UND J839D

## (undated) DEVICE — GLOVE BIOGEL PI INDICATOR 8.0 LF 41680

## (undated) DEVICE — SU ETHILON 3-0 PS-2 18" 1669H

## (undated) DEVICE — PENCIL MEGADYNE TELESCOPING SMOKE EVACUATION 10 FT 251010J

## (undated) DEVICE — SU VICRYL 3-0 SH 27" J784G

## (undated) RX ORDER — FENTANYL CITRATE 50 UG/ML
INJECTION, SOLUTION INTRAMUSCULAR; INTRAVENOUS
Status: DISPENSED
Start: 2025-02-12

## (undated) RX ORDER — CEFTRIAXONE SODIUM 1 G/50ML
INJECTION, SOLUTION INTRAVENOUS
Status: DISPENSED
Start: 2022-11-05

## (undated) RX ORDER — OSELTAMIVIR PHOSPHATE 75 MG/1
CAPSULE ORAL
Status: DISPENSED
Start: 2018-03-02

## (undated) RX ORDER — DEXAMETHASONE SODIUM PHOSPHATE 4 MG/ML
INJECTION, SOLUTION INTRA-ARTICULAR; INTRALESIONAL; INTRAMUSCULAR; INTRAVENOUS; SOFT TISSUE
Status: DISPENSED
Start: 2025-02-12

## (undated) RX ORDER — PROPOFOL 10 MG/ML
INJECTION, EMULSION INTRAVENOUS
Status: DISPENSED
Start: 2025-02-12

## (undated) RX ORDER — SODIUM CHLORIDE 9 MG/ML
INJECTION, SOLUTION INTRAVENOUS
Status: DISPENSED
Start: 2019-06-22

## (undated) RX ORDER — BUPIVACAINE HYDROCHLORIDE AND EPINEPHRINE 5; 5 MG/ML; UG/ML
INJECTION, SOLUTION PERINEURAL
Status: DISPENSED
Start: 2023-10-04

## (undated) RX ORDER — DEXMEDETOMIDINE HYDROCHLORIDE 4 UG/ML
INJECTION, SOLUTION INTRAVENOUS
Status: DISPENSED
Start: 2025-02-12

## (undated) RX ORDER — IBUPROFEN 400 MG/1
TABLET, FILM COATED ORAL
Status: DISPENSED
Start: 2018-03-02

## (undated) RX ORDER — ACETAMINOPHEN 325 MG/1
TABLET ORAL
Status: DISPENSED
Start: 2025-02-12

## (undated) RX ORDER — PANTOPRAZOLE SODIUM 40 MG/1
TABLET, DELAYED RELEASE ORAL
Status: DISPENSED
Start: 2022-09-08

## (undated) RX ORDER — IBUPROFEN 200 MG
TABLET ORAL
Status: DISPENSED
Start: 2018-03-02

## (undated) RX ORDER — SUMATRIPTAN 6 MG/.5ML
INJECTION, SOLUTION SUBCUTANEOUS
Status: DISPENSED
Start: 2019-06-22

## (undated) RX ORDER — ACETAMINOPHEN 325 MG/1
TABLET ORAL
Status: DISPENSED
Start: 2025-07-01

## (undated) RX ORDER — GLYCOPYRROLATE 0.2 MG/ML
INJECTION, SOLUTION INTRAMUSCULAR; INTRAVENOUS
Status: DISPENSED
Start: 2025-02-12

## (undated) RX ORDER — KETOROLAC TROMETHAMINE 30 MG/ML
INJECTION, SOLUTION INTRAMUSCULAR; INTRAVENOUS
Status: DISPENSED
Start: 2023-04-28

## (undated) RX ORDER — OXYCODONE HYDROCHLORIDE 5 MG/1
TABLET ORAL
Status: DISPENSED
Start: 2025-02-12

## (undated) RX ORDER — BUPIVACAINE HYDROCHLORIDE AND EPINEPHRINE 5; 5 MG/ML; UG/ML
INJECTION, SOLUTION EPIDURAL; INTRACAUDAL; PERINEURAL
Status: DISPENSED
Start: 2025-02-12

## (undated) RX ORDER — SODIUM CHLORIDE, SODIUM LACTATE, POTASSIUM CHLORIDE, CALCIUM CHLORIDE 600; 310; 30; 20 MG/100ML; MG/100ML; MG/100ML; MG/100ML
INJECTION, SOLUTION INTRAVENOUS
Status: DISPENSED
Start: 2025-02-12

## (undated) RX ORDER — KETOROLAC TROMETHAMINE 30 MG/ML
INJECTION, SOLUTION INTRAMUSCULAR; INTRAVENOUS
Status: DISPENSED
Start: 2019-06-22

## (undated) RX ORDER — CEFAZOLIN SODIUM/WATER 2 G/20 ML
SYRINGE (ML) INTRAVENOUS
Status: DISPENSED
Start: 2025-02-12

## (undated) RX ORDER — ACETAMINOPHEN 325 MG/1
TABLET ORAL
Status: DISPENSED
Start: 2023-09-08

## (undated) RX ORDER — FENTANYL CITRATE-0.9 % NACL/PF 10 MCG/ML
PLASTIC BAG, INJECTION (ML) INTRAVENOUS
Status: DISPENSED
Start: 2025-02-12

## (undated) RX ORDER — DIPHENHYDRAMINE HYDROCHLORIDE 50 MG/ML
INJECTION INTRAMUSCULAR; INTRAVENOUS
Status: DISPENSED
Start: 2023-04-28

## (undated) RX ORDER — ONDANSETRON 2 MG/ML
INJECTION INTRAMUSCULAR; INTRAVENOUS
Status: DISPENSED
Start: 2025-02-12

## (undated) RX ORDER — DEXAMETHASONE SODIUM PHOSPHATE 4 MG/ML
INJECTION, SOLUTION INTRA-ARTICULAR; INTRALESIONAL; INTRAMUSCULAR; INTRAVENOUS; SOFT TISSUE
Status: DISPENSED
Start: 2019-11-18

## (undated) RX ORDER — DIPHENHYDRAMINE HYDROCHLORIDE 50 MG/ML
INJECTION INTRAMUSCULAR; INTRAVENOUS
Status: DISPENSED
Start: 2019-06-22

## (undated) RX ORDER — DEXAMETHASONE SODIUM PHOSPHATE 4 MG/ML
INJECTION, SOLUTION INTRA-ARTICULAR; INTRALESIONAL; INTRAMUSCULAR; INTRAVENOUS; SOFT TISSUE
Status: DISPENSED
Start: 2019-06-22

## (undated) RX ORDER — ONDANSETRON 2 MG/ML
INJECTION INTRAMUSCULAR; INTRAVENOUS
Status: DISPENSED
Start: 2022-11-05